# Patient Record
Sex: FEMALE | Race: WHITE | NOT HISPANIC OR LATINO | Employment: FULL TIME | URBAN - METROPOLITAN AREA
[De-identification: names, ages, dates, MRNs, and addresses within clinical notes are randomized per-mention and may not be internally consistent; named-entity substitution may affect disease eponyms.]

---

## 2017-01-27 DIAGNOSIS — Z00.00 LABORATORY TESTS ORDERED AS PART OF A COMPLETE PHYSICAL EXAM (CPE): Primary | ICD-10-CM

## 2017-01-31 LAB
25(OH)D3+25(OH)D2 SERPL-MCNC: 22.8 NG/ML (ref 30–100)
ALBUMIN SERPL-MCNC: 4 G/DL (ref 3.5–5.5)
ALBUMIN/GLOB SERPL: 1.7 {RATIO} (ref 1.1–2.5)
ALP SERPL-CCNC: 59 IU/L (ref 39–117)
ALT SERPL-CCNC: 10 IU/L (ref 0–32)
APPEARANCE UR: CLEAR
AST SERPL-CCNC: 15 IU/L (ref 0–40)
BACTERIA #/AREA URNS HPF: NORMAL /[HPF]
BASOPHILS # BLD AUTO: 0.1 X10E3/UL (ref 0–0.2)
BASOPHILS NFR BLD AUTO: 1 %
BILIRUB SERPL-MCNC: 0.5 MG/DL (ref 0–1.2)
BILIRUB UR QL STRIP: NEGATIVE
BUN SERPL-MCNC: 8 MG/DL (ref 6–24)
BUN/CREAT SERPL: 14 (ref 9–23)
CALCIUM SERPL-MCNC: 8.8 MG/DL (ref 8.7–10.2)
CHLORIDE SERPL-SCNC: 102 MMOL/L (ref 96–106)
CHOLEST SERPL-MCNC: 158 MG/DL (ref 100–199)
CO2 SERPL-SCNC: 23 MMOL/L (ref 18–29)
COLOR UR: YELLOW
CREAT SERPL-MCNC: 0.56 MG/DL (ref 0.57–1)
EOSINOPHIL # BLD AUTO: 0.1 X10E3/UL (ref 0–0.4)
EOSINOPHIL NFR BLD AUTO: 2 %
EPI CELLS #/AREA URNS HPF: NORMAL /HPF
ERYTHROCYTE [DISTWIDTH] IN BLOOD BY AUTOMATED COUNT: 13.5 % (ref 12.3–15.4)
ESTROGEN SERPL-MCNC: 135 PG/ML
FSH SERPL-ACNC: 22.9 MIU/ML
GLOBULIN SER CALC-MCNC: 2.4 G/DL (ref 1.5–4.5)
GLUCOSE SERPL-MCNC: 87 MG/DL (ref 65–99)
GLUCOSE UR QL: NEGATIVE
HCT VFR BLD AUTO: 39.8 % (ref 34–46.6)
HDLC SERPL-MCNC: 66 MG/DL
HGB BLD-MCNC: 13 G/DL (ref 11.1–15.9)
HGB UR QL STRIP: NEGATIVE
IMM GRANULOCYTES # BLD: 0 X10E3/UL (ref 0–0.1)
IMM GRANULOCYTES NFR BLD: 0 %
KETONES UR QL STRIP: NEGATIVE
LDLC SERPL CALC-MCNC: 79 MG/DL (ref 0–99)
LEUKOCYTE ESTERASE UR QL STRIP: NEGATIVE
LYMPHOCYTES # BLD AUTO: 2 X10E3/UL (ref 0.7–3.1)
LYMPHOCYTES NFR BLD AUTO: 26 %
MCH RBC QN AUTO: 29.5 PG (ref 26.6–33)
MCHC RBC AUTO-ENTMCNC: 32.7 G/DL (ref 31.5–35.7)
MCV RBC AUTO: 91 FL (ref 79–97)
MICRO URNS: (no result)
MICRO URNS: (no result)
MONOCYTES # BLD AUTO: 0.7 X10E3/UL (ref 0.1–0.9)
MONOCYTES NFR BLD AUTO: 9 %
NEUTROPHILS # BLD AUTO: 4.9 X10E3/UL (ref 1.4–7)
NEUTROPHILS NFR BLD AUTO: 62 %
NITRITE UR QL STRIP: NEGATIVE
PH UR STRIP: 7.5 [PH] (ref 5–7.5)
PLATELET # BLD AUTO: 336 X10E3/UL (ref 150–379)
POTASSIUM SERPL-SCNC: 4.9 MMOL/L (ref 3.5–5.2)
PROGEST SERPL-MCNC: 0.2 NG/ML
PROT SERPL-MCNC: 6.4 G/DL (ref 6–8.5)
PROT UR QL STRIP: NEGATIVE
RBC # BLD AUTO: 4.4 X10E6/UL (ref 3.77–5.28)
RBC #/AREA URNS HPF: NORMAL /HPF
SODIUM SERPL-SCNC: 140 MMOL/L (ref 134–144)
SP GR UR: <=1.005 (ref 1–1.03)
TRIGL SERPL-MCNC: 64 MG/DL (ref 0–149)
TSH SERPL DL<=0.005 MIU/L-ACNC: 0.99 UIU/ML (ref 0.45–4.5)
UROBILINOGEN UR STRIP-MCNC: 0.2 MG/DL (ref 0.2–1)
VLDLC SERPL CALC-MCNC: 13 MG/DL (ref 5–40)
WBC # BLD AUTO: 7.8 X10E3/UL (ref 3.4–10.8)
WBC #/AREA URNS HPF: NORMAL /HPF

## 2017-02-06 ENCOUNTER — OFFICE VISIT (OUTPATIENT)
Dept: INTERNAL MEDICINE | Facility: CLINIC | Age: 52
End: 2017-02-06

## 2017-02-06 VITALS
HEIGHT: 68 IN | BODY MASS INDEX: 26.67 KG/M2 | DIASTOLIC BLOOD PRESSURE: 84 MMHG | HEART RATE: 80 BPM | OXYGEN SATURATION: 98 % | WEIGHT: 176 LBS | SYSTOLIC BLOOD PRESSURE: 120 MMHG

## 2017-02-06 DIAGNOSIS — Z71.6 ENCOUNTER FOR SMOKING CESSATION COUNSELING: ICD-10-CM

## 2017-02-06 DIAGNOSIS — Z00.00 WELL ADULT EXAM: Primary | ICD-10-CM

## 2017-02-06 DIAGNOSIS — Z11.59 NEED FOR HEPATITIS C SCREENING TEST: ICD-10-CM

## 2017-02-06 PROBLEM — F17.210 LIGHT TOBACCO SMOKER <10 CIGARETTES PER DAY: Status: ACTIVE | Noted: 2017-02-06

## 2017-02-06 PROBLEM — J45.20 MILD INTERMITTENT ASTHMA WITHOUT COMPLICATION: Status: ACTIVE | Noted: 2017-02-06

## 2017-02-06 PROCEDURE — 99396 PREV VISIT EST AGE 40-64: CPT | Performed by: INTERNAL MEDICINE

## 2017-02-06 PROCEDURE — 93000 ELECTROCARDIOGRAM COMPLETE: CPT | Performed by: INTERNAL MEDICINE

## 2017-02-06 NOTE — PROGRESS NOTES
Subjective     Yi Hurtado is a 52 y.o. female who presents for a complete physical exam.      History of Present Illness     Review of Systems   Constitutional: Negative.    HENT: Negative.    Eyes: Negative.    Respiratory: Negative.    Cardiovascular: Negative.    Gastrointestinal: Negative.    Endocrine: Negative.    Genitourinary: Negative.    Musculoskeletal: Negative.    Skin: Negative.    Allergic/Immunologic: Negative.    Neurological: Negative.    Hematological: Negative.    Psychiatric/Behavioral: Negative.        The following portions of the patient's history were reviewed and updated as appropriate: allergies, current medications, past family history, past medical history, past social history, past surgical history and problem list.     Patient Active Problem List    Diagnosis Date Noted   • Mild intermittent asthma without complication 02/06/2017   • Light tobacco smoker <10 cigarettes per day 02/06/2017   • Allergic rhinitis 01/29/2016       Past Medical History   Diagnosis Date   • Allergic      sulpha, cipro   • Blood tests for routine general physical examination    • Earache    • Eustachian tube dysfunction    • Injury of back      lumbar bulging discs, unsure level   • Irritable bowel syndrome    • Myelopathy    • Simple goiter    • Urinary tract infection        Past Surgical History   Procedure Laterality Date   • Hysterectomy         Family History   Problem Relation Age of Onset   • Dementia Mother    • Hypothyroidism Mother    • Hypertension Mother    • Diabetes type II Mother    • Stroke Mother    • Alcohol abuse Father    • Other Father      acute heart attack   • Heart disease Father    • ADD / ADHD Son        Social History     Social History   • Marital status: Single     Spouse name: N/A   • Number of children: N/A   • Years of education: N/A     Occupational History   • Not on file.     Social History Main Topics   • Smoking status: Current Every Day Smoker     Packs/day: 0.20      "Types: Cigarettes   • Smokeless tobacco: Not on file   • Alcohol use 0.6 oz/week     1 Standard drinks or equivalent per week   • Drug use: Not on file   • Sexual activity: Not on file     Other Topics Concern   • Not on file     Social History Narrative       Current Outpatient Prescriptions on File Prior to Visit   Medication Sig Dispense Refill   • fluticasone (FLONASE) 50 MCG/ACT nasal spray 2 sprays into each nostril daily.     • Omega-3 Fatty Acids (FISH OIL PO) Take 1 tablet by mouth daily.     • [DISCONTINUED] Chlorcyclizine-Pseudoephed 25-60 MG tablet Take by mouth 2 (two) times a day.     • [DISCONTINUED] ciclopirox (PENLAC) 8 % solution Apply topically nightly. 1 mL 0   • [DISCONTINUED] fexofenadine-pseudoephedrine (ALLEGRA-D)  MG per 12 hr tablet Take 1 tablet by mouth every 12 (twelve) hours as needed.     • [DISCONTINUED] Probiotic Product (PROBIOTIC PO) Take by mouth.       No current facility-administered medications on file prior to visit.        Allergies   Allergen Reactions   • Ciprofloxacin    • Sulfa Antibiotics        Immunization History   Administered Date(s) Administered   • Tdap 05/01/2008       Objective     Visit Vitals   • /84   • Pulse 80   • Ht 67.5\" (171.5 cm)   • Wt 176 lb (79.8 kg)   • SpO2 98%   • BMI 27.16 kg/m2       Physical Exam   Constitutional: She is oriented to person, place, and time. She appears well-developed and well-nourished.   HENT:   Head: Normocephalic and atraumatic.   Right Ear: Hearing, tympanic membrane and external ear normal.   Left Ear: Hearing, tympanic membrane and external ear normal.   Nose: Nose normal.   Mouth/Throat: Oropharynx is clear and moist.   Neck: Neck supple. No thyromegaly present.   Cardiovascular: Normal rate, regular rhythm and normal heart sounds.    No murmur heard.  Pulmonary/Chest: Effort normal and breath sounds normal. Right breast exhibits no mass. Left breast exhibits no mass.   Abdominal: Soft. She exhibits no " distension. There is no hepatosplenomegaly. There is no tenderness.   Genitourinary: No breast tenderness.   Lymphadenopathy:     She has no cervical adenopathy.   Neurological: She is alert and oriented to person, place, and time.   Skin: Skin is warm and dry.   Psychiatric: She has a normal mood and affect.         ECG 12 Lead  Date/Time: 2/6/2017 10:32 AM  Performed by: MING BROWNING  Authorized by: MING BROWNING   Comparison: compared with previous ECG from 1/29/2016  Similar to previous ECG  Rhythm: sinus rhythm  Rate: normal  Conduction: non-specific intraventricular conduction delay  ST Segments: ST segments normal  T Waves: T waves normal  QRS axis: normal  Q waves: V1, V2 and V3  Clinical impression: non-specific ECG  Comments: Same appearance to EKG for number of years.                Assessment/Plan   Yi was seen today for annual exam.    Diagnoses and all orders for this visit:    Well adult exam  -     ECG 12 Lead    Laboratory tests ordered as part of a complete physical exam (CPE)    Need for hepatitis C screening test  -     Hepatitis C Antibody    Encounter for smoking cessation counseling        Discussion    Patient presents today for a CPE.      Patient follows a healthy diet.   Patient follows an adequate exercise regimen. Mammogram is up to date.   Colon cancer screening is up to date.   Pap smears are performed by the patient's gynecologist.   Immunizations:  See flow sheet.   DEXA is up to date.  We discussed smoking cessation today.  Discussed options for medications including Wellbutrin, Chantix and Nicoderm replacement.  She is going to try OTC nicotine replacement.     Health Maintenance   Topic Date Due   • HEPATITIS C SCREENING  01/29/2016   • TDAP/TD VACCINES (2 - Td) 05/01/2018   • MAMMOGRAM  05/12/2018   • PAP SMEAR  11/01/2019   • DXA SCAN  11/06/2019   • COLONOSCOPY  06/10/2023   • PNEUMOCOCCAL VACCINE (19-64 MEDIUM RISK)  Addressed   • INFLUENZA VACCINE  Excluded             No future appointments.

## 2017-02-07 LAB — HCV AB S/CO SERPL IA: <0.1 S/CO RATIO (ref 0–0.9)

## 2017-06-07 ENCOUNTER — OFFICE VISIT (OUTPATIENT)
Dept: INTERNAL MEDICINE | Facility: CLINIC | Age: 52
End: 2017-06-07

## 2017-06-07 VITALS
HEART RATE: 75 BPM | BODY MASS INDEX: 26.67 KG/M2 | DIASTOLIC BLOOD PRESSURE: 90 MMHG | TEMPERATURE: 97.5 F | SYSTOLIC BLOOD PRESSURE: 130 MMHG | OXYGEN SATURATION: 100 % | WEIGHT: 176 LBS | HEIGHT: 68 IN

## 2017-06-07 DIAGNOSIS — H69.81 EUSTACHIAN TUBE DYSFUNCTION, RIGHT: ICD-10-CM

## 2017-06-07 DIAGNOSIS — J01.90 ACUTE SINUSITIS, RECURRENCE NOT SPECIFIED, UNSPECIFIED LOCATION: Primary | ICD-10-CM

## 2017-06-07 PROCEDURE — 99214 OFFICE O/P EST MOD 30 MIN: CPT | Performed by: INTERNAL MEDICINE

## 2017-06-07 RX ORDER — AMOXICILLIN 500 MG/1
CAPSULE ORAL
Refills: 0 | COMMUNITY
Start: 2017-05-31 | End: 2017-09-11

## 2017-06-07 RX ORDER — TRIAMCINOLONE ACETONIDE 55 UG/1
2 SPRAY, METERED NASAL DAILY
COMMUNITY
End: 2017-12-08

## 2017-06-07 RX ORDER — METHYLPREDNISOLONE 4 MG/1
TABLET ORAL
Qty: 21 TABLET | Refills: 0 | Status: SHIPPED | OUTPATIENT
Start: 2017-06-07 | End: 2017-09-11

## 2017-06-07 RX ORDER — MONTELUKAST SODIUM 10 MG/1
10 TABLET ORAL NIGHTLY
COMMUNITY
End: 2018-03-19 | Stop reason: ALTCHOICE

## 2017-06-07 RX ORDER — DOXYCYCLINE 100 MG/1
100 CAPSULE ORAL 2 TIMES DAILY
Qty: 20 CAPSULE | Refills: 0 | Status: SHIPPED | OUTPATIENT
Start: 2017-06-07 | End: 2017-12-08

## 2017-06-07 NOTE — PROGRESS NOTES
"Subjective     Yi Hurtado is a 52 y.o. female who presents with   Chief Complaint   Patient presents with   • Earache     Right       History of Present Illness     Right ear pain.  Going on for a week.  She is taking Nasocort, afrin, and singulair.  Sinus pain and pressure associated.  Nothing in chest.  Her allergist put her on amoxil 500 BID over the phone.  Moderate severity of constant pain.     Review of Systems   Respiratory: Negative.    Cardiovascular: Negative.        The following portions of the patient's history were reviewed and updated as appropriate: allergies, current medications and problem list.    Patient Active Problem List    Diagnosis Date Noted   • Mild intermittent asthma without complication 02/06/2017   • Light tobacco smoker <10 cigarettes per day 02/06/2017   • Allergic rhinitis 01/29/2016       Current Outpatient Prescriptions on File Prior to Visit   Medication Sig Dispense Refill   • Chlorcyclizine-Pseudoephed 25-60 MG tablet Take  by mouth.     • ciclopirox (PENLAC) 8 % solution APPLY TOPICALLY AT NIGHT 1 each 0   • Multiple Vitamin (MULTI VITAMIN PO) Take  by mouth.     • Omega-3 Fatty Acids (FISH OIL PO) Take 1 tablet by mouth daily.     • [DISCONTINUED] fluticasone (FLONASE) 50 MCG/ACT nasal spray 2 sprays into each nostril daily.       No current facility-administered medications on file prior to visit.        Objective     /90  Pulse 75  Temp 97.5 °F (36.4 °C)  Ht 67.5\" (171.5 cm)  Wt 176 lb (79.8 kg)  SpO2 100%  BMI 27.16 kg/m2    Physical Exam   Constitutional: She is oriented to person, place, and time. She appears well-developed and well-nourished.   HENT:   Head: Normocephalic and atraumatic.   Right Ear: Hearing and tympanic membrane normal.   Left Ear: Hearing and tympanic membrane normal.   Mouth/Throat: No oropharyngeal exudate or posterior oropharyngeal erythema.   Cardiovascular: Normal rate, regular rhythm and normal heart sounds.    Pulmonary/Chest: " Effort normal and breath sounds normal.   Lymphadenopathy:     She has cervical adenopathy.        Right cervical: Superficial cervical adenopathy present.   Neurological: She is alert and oriented to person, place, and time.   Skin: Skin is warm and dry.   Psychiatric: She has a normal mood and affect. Her behavior is normal.       Assessment/Plan   Yi was seen today for earache.    Diagnoses and all orders for this visit:    Acute sinusitis, recurrence not specified, unspecified location    Eustachian tube dysfunction, right    Other orders  -     MethylPREDNISolone (MEDROL, DENNY,) 4 MG tablet; Take as directed on package instructions.  -     doxycycline (MONODOX) 100 MG capsule; Take 1 capsule by mouth 2 (Two) Times a Day.        Discussion  Patient presents with sinus infection and superficial LAD failing amoxil 500 BID.  Rx for steroid denny and change to doxycycline since Augmentin bothers her stomach.  Let me know if not feeling better over the next 3 days or if there is any change in symptoms.  I would caution on using Afrin more than three days.          Future Appointments  Date Time Provider Department Center   6/7/2017 11:45 AM MD SUKHJINDER Jackman None

## 2017-06-12 ENCOUNTER — OFFICE VISIT (OUTPATIENT)
Dept: INTERNAL MEDICINE | Facility: CLINIC | Age: 52
End: 2017-06-12

## 2017-06-12 VITALS
WEIGHT: 170 LBS | TEMPERATURE: 98 F | SYSTOLIC BLOOD PRESSURE: 124 MMHG | HEIGHT: 68 IN | HEART RATE: 81 BPM | OXYGEN SATURATION: 98 % | BODY MASS INDEX: 25.76 KG/M2 | DIASTOLIC BLOOD PRESSURE: 94 MMHG

## 2017-06-12 DIAGNOSIS — H92.01 RIGHT EAR PAIN: Primary | ICD-10-CM

## 2017-06-12 PROCEDURE — 99213 OFFICE O/P EST LOW 20 MIN: CPT | Performed by: INTERNAL MEDICINE

## 2017-06-12 RX ORDER — ALBUTEROL SULFATE 90 UG/1
2 AEROSOL, METERED RESPIRATORY (INHALATION) EVERY 4 HOURS PRN
Qty: 1 INHALER | Refills: 11 | Status: SHIPPED | OUTPATIENT
Start: 2017-06-12 | End: 2020-03-09

## 2017-06-12 NOTE — PROGRESS NOTES
Subjective     Yi Hurtado is a 52 y.o. female who presents with   Chief Complaint   Patient presents with   • Earache   • Facial Pain       History of Present Illness     Right ear pain. Going on for two weeks. She was taking Nasocort, afrin, and singulair. Sinus pain and pressure associated.  Her allergist put her on amoxil 500 BID over the phone and then I changed her to doxycycline and steriod denny.  Steriod denny helped but now pain has returned.  Now she is having some SOA as well with her h/o asthma and needs her inhaler refilled for sporadic use.     Review of Systems   Constitutional: Negative for fever.       The following portions of the patient's history were reviewed and updated as appropriate: allergies, current medications and problem list.    Patient Active Problem List    Diagnosis Date Noted   • Mild intermittent asthma without complication 02/06/2017   • Light tobacco smoker <10 cigarettes per day 02/06/2017   • Allergic rhinitis 01/29/2016       Current Outpatient Prescriptions on File Prior to Visit   Medication Sig Dispense Refill   • amoxicillin (AMOXIL) 500 MG capsule   0   • Chlorcyclizine-Pseudoephed 25-60 MG tablet Take  by mouth.     • CHLORPHEN-PSE-ATROP-HYOS-SCOP PO Take  by mouth.     • ciclopirox (PENLAC) 8 % solution APPLY TOPICALLY AT NIGHT 1 each 0   • doxycycline (MONODOX) 100 MG capsule Take 1 capsule by mouth 2 (Two) Times a Day. 20 capsule 0   • MethylPREDNISolone (MEDROL, DENNY,) 4 MG tablet Take as directed on package instructions. 21 tablet 0   • montelukast (SINGULAIR) 10 MG tablet Take 10 mg by mouth Every Night.     • Multiple Vitamin (MULTI VITAMIN PO) Take  by mouth.     • Omega-3 Fatty Acids (FISH OIL PO) Take 1 tablet by mouth daily.     • Triamcinolone Acetonide (NASACORT) 55 MCG/ACT nasal inhaler 2 sprays into each nostril Daily.       No current facility-administered medications on file prior to visit.        Objective     /94  Pulse 81  Temp 98 °F (36.7 °C)   "Ht 67.5\" (171.5 cm)  Wt 170 lb (77.1 kg)  SpO2 98%  BMI 26.23 kg/m2    Physical Exam   Constitutional: She is oriented to person, place, and time. She appears well-developed and well-nourished.   HENT:   Head: Normocephalic and atraumatic.   Right Ear: Hearing normal. Tympanic membrane is bulging.   Left Ear: Hearing and tympanic membrane normal.   Mouth/Throat: No oropharyngeal exudate or posterior oropharyngeal erythema.   Cardiovascular: Normal rate, regular rhythm and normal heart sounds.    Pulmonary/Chest: Effort normal and breath sounds normal.   Neurological: She is alert and oriented to person, place, and time.   Skin: Skin is warm and dry.   Psychiatric: She has a normal mood and affect. Her behavior is normal.       Assessment/Plan   Yi was seen today for earache and facial pain.    Diagnoses and all orders for this visit:    Right ear pain  -     Ambulatory Referral to ENT (Otolaryngology)    Other orders  -     albuterol (PROVENTIL HFA;VENTOLIN HFA) 108 (90 BASE) MCG/ACT inhaler; Inhale 2 puffs Every 4 (Four) Hours As Needed for Wheezing.        Discussion  Patient presents with right ear pain that I favor is secondary to eustachian tube dysfunction.  She is using decongestants, nasal and oral steroids, antibiotics with little relief.  Refer to ENT for opinion.  Appointment made for Wednesday.  15 minutes spent with the patient with 10 minutes spent counseling the following topics:, impressions         No future appointments.      "

## 2017-09-11 ENCOUNTER — OFFICE VISIT (OUTPATIENT)
Dept: INTERNAL MEDICINE | Facility: CLINIC | Age: 52
End: 2017-09-11

## 2017-09-11 VITALS
DIASTOLIC BLOOD PRESSURE: 98 MMHG | BODY MASS INDEX: 25.91 KG/M2 | WEIGHT: 171 LBS | HEIGHT: 68 IN | HEART RATE: 75 BPM | OXYGEN SATURATION: 98 % | SYSTOLIC BLOOD PRESSURE: 132 MMHG

## 2017-09-11 DIAGNOSIS — R82.90 ABNORMAL URINE: Primary | ICD-10-CM

## 2017-09-11 LAB
BILIRUB BLD-MCNC: NEGATIVE MG/DL
CLARITY, POC: CLEAR
COLOR UR: YELLOW
GLUCOSE UR STRIP-MCNC: NEGATIVE MG/DL
KETONES UR QL: NEGATIVE
LEUKOCYTE EST, POC: ABNORMAL
NITRITE UR-MCNC: NEGATIVE MG/ML
PH UR: 5 [PH] (ref 5–8)
PROT UR STRIP-MCNC: NEGATIVE MG/DL
RBC # UR STRIP: ABNORMAL /UL
SP GR UR: 1 (ref 1–1.03)
UROBILINOGEN UR QL: NORMAL

## 2017-09-11 PROCEDURE — 99213 OFFICE O/P EST LOW 20 MIN: CPT | Performed by: INTERNAL MEDICINE

## 2017-09-11 PROCEDURE — 81003 URINALYSIS AUTO W/O SCOPE: CPT | Performed by: INTERNAL MEDICINE

## 2017-09-11 NOTE — PROGRESS NOTES
Subjective     Yi Hurtado is a 52 y.o. female who presents with   Chief Complaint   Patient presents with   • Urinary Tract Infection     Follow up       History of Present Illness     Two weeks ago with UTI.  Seen at [facility].  She was given macrobid.  Foul smelling urine, dysuria, and frequency.  Symptoms resolved.  Past few mornings with altered smell of urine.      Review of Systems   Constitutional: Negative for fever.   Gastrointestinal: Negative for abdominal pain.       The following portions of the patient's history were reviewed and updated as appropriate: allergies, current medications and problem list.    Patient Active Problem List    Diagnosis Date Noted   • Mild intermittent asthma without complication 02/06/2017   • Light tobacco smoker <10 cigarettes per day 02/06/2017   • Allergic rhinitis 01/29/2016       Current Outpatient Prescriptions on File Prior to Visit   Medication Sig Dispense Refill   • albuterol (PROVENTIL HFA;VENTOLIN HFA) 108 (90 BASE) MCG/ACT inhaler Inhale 2 puffs Every 4 (Four) Hours As Needed for Wheezing. 1 inhaler 11   • Chlorcyclizine-Pseudoephed 25-60 MG tablet Take  by mouth.     • CHLORPHEN-PSE-ATROP-HYOS-SCOP PO Take  by mouth.     • ciclopirox (PENLAC) 8 % solution APPLY TOPICALLY AT NIGHT 1 each 0   • doxycycline (MONODOX) 100 MG capsule Take 1 capsule by mouth 2 (Two) Times a Day. 20 capsule 0   • montelukast (SINGULAIR) 10 MG tablet Take 10 mg by mouth Every Night.     • Multiple Vitamin (MULTI VITAMIN PO) Take  by mouth.     • Omega-3 Fatty Acids (FISH OIL PO) Take 1 tablet by mouth daily.     • Triamcinolone Acetonide (NASACORT) 55 MCG/ACT nasal inhaler 2 sprays into each nostril Daily.     • [DISCONTINUED] amoxicillin (AMOXIL) 500 MG capsule   0   • [DISCONTINUED] MethylPREDNISolone (MEDROL, DENNY,) 4 MG tablet Take as directed on package instructions. 21 tablet 0     No current facility-administered medications on file prior to visit.        Objective     BP  "132/98  Pulse 75  Ht 67.5\" (171.5 cm)  Wt 171 lb (77.6 kg)  SpO2 98%  BMI 26.39 kg/m2    Physical Exam   Constitutional: She is oriented to person, place, and time. She appears well-developed and well-nourished.   HENT:   Head: Normocephalic and atraumatic.   Pulmonary/Chest: Effort normal.   Neurological: She is alert and oriented to person, place, and time.   Psychiatric: She has a normal mood and affect. Her behavior is normal.       Assessment/Plan   Yi was seen today for urinary tract infection.    Diagnoses and all orders for this visit:    Abnormal urine  -     POC Urinalysis Dipstick, Automated  -     Urine Culture        Discussion  Patient presents in f/u of UTI.  She still has foul smelling urine at times.  Urine dip with moderate leukocytes.  Await culture before treatment.  Continue Azo cranberry.  10/15 minutes was spent in counseling of the following topics: test results, impressions, treatment options.         No future appointments.      "

## 2017-09-13 ENCOUNTER — TELEPHONE (OUTPATIENT)
Dept: INTERNAL MEDICINE | Facility: CLINIC | Age: 52
End: 2017-09-13

## 2017-09-13 NOTE — TELEPHONE ENCOUNTER
Pt would like UA results  LG    I am still awaiting culture results.  It takes three days.  GRACIELA

## 2017-09-15 LAB
BACTERIA UR CULT: ABNORMAL
BACTERIA UR CULT: ABNORMAL
OTHER ANTIBIOTIC SUSC ISLT: ABNORMAL

## 2017-09-15 RX ORDER — AMOXICILLIN AND CLAVULANATE POTASSIUM 875; 125 MG/1; MG/1
1 TABLET, FILM COATED ORAL 2 TIMES DAILY
Qty: 14 TABLET | Refills: 0 | Status: SHIPPED | OUTPATIENT
Start: 2017-09-15 | End: 2017-12-08

## 2017-10-13 ENCOUNTER — TELEPHONE (OUTPATIENT)
Dept: INTERNAL MEDICINE | Facility: CLINIC | Age: 52
End: 2017-10-13

## 2017-10-13 NOTE — TELEPHONE ENCOUNTER
Pt called and would like information on the quitting smoking program you suggested to her at her physical.  LG    I would have her call 1-800-quit-now for info on programs.  I also have a handout to mail her.  GRACIELA

## 2017-12-08 ENCOUNTER — OFFICE VISIT (OUTPATIENT)
Dept: INTERNAL MEDICINE | Facility: CLINIC | Age: 52
End: 2017-12-08

## 2017-12-08 VITALS
HEIGHT: 68 IN | OXYGEN SATURATION: 98 % | DIASTOLIC BLOOD PRESSURE: 90 MMHG | HEART RATE: 78 BPM | BODY MASS INDEX: 26.37 KG/M2 | SYSTOLIC BLOOD PRESSURE: 120 MMHG | WEIGHT: 174 LBS

## 2017-12-08 DIAGNOSIS — R07.89 ATYPICAL CHEST PAIN: Primary | ICD-10-CM

## 2017-12-08 DIAGNOSIS — R13.10 PAIN WITH SWALLOWING: ICD-10-CM

## 2017-12-08 PROCEDURE — 99213 OFFICE O/P EST LOW 20 MIN: CPT | Performed by: INTERNAL MEDICINE

## 2017-12-08 PROCEDURE — 93000 ELECTROCARDIOGRAM COMPLETE: CPT | Performed by: INTERNAL MEDICINE

## 2017-12-08 PROCEDURE — 71020 XR CHEST PA AND LATERAL: CPT | Performed by: INTERNAL MEDICINE

## 2017-12-08 RX ORDER — FEXOFENADINE HCL 180 MG/1
180 TABLET ORAL 2 TIMES DAILY
COMMUNITY

## 2017-12-08 RX ORDER — FLUTICASONE PROPIONATE 50 MCG
2 SPRAY, SUSPENSION (ML) NASAL DAILY
COMMUNITY
End: 2018-03-19 | Stop reason: ALTCHOICE

## 2017-12-08 NOTE — PROGRESS NOTES
Subjective     Yi Hurtado is a 52 y.o. female who presents with   Chief Complaint   Patient presents with   • GI Problem       History of Present Illness     Substernal pain.  Off and on pain for six months.  Hurts to swallow.  No dysphagia.  Heartburn is associated.  More stress.  Not with exertion.  No cough.  No SOA.   No nausea.  No ABD pain.      Review of Systems   Respiratory: Negative for cough and shortness of breath.    Cardiovascular: Positive for chest pain. Negative for palpitations and leg swelling.       The following portions of the patient's history were reviewed and updated as appropriate: allergies, current medications and problem list.    Patient Active Problem List    Diagnosis Date Noted   • Mild intermittent asthma without complication 02/06/2017   • Light tobacco smoker <10 cigarettes per day 02/06/2017   • Allergic rhinitis 01/29/2016       Current Outpatient Prescriptions on File Prior to Visit   Medication Sig Dispense Refill   • albuterol (PROVENTIL HFA;VENTOLIN HFA) 108 (90 BASE) MCG/ACT inhaler Inhale 2 puffs Every 4 (Four) Hours As Needed for Wheezing. 1 inhaler 11   • ciclopirox (PENLAC) 8 % solution APPLY TOPICALLY AT NIGHT 1 each 0   • montelukast (SINGULAIR) 10 MG tablet Take 10 mg by mouth Every Night.     • Multiple Vitamin (MULTI VITAMIN PO) Take  by mouth.     • Omega-3 Fatty Acids (FISH OIL PO) Take 1 tablet by mouth daily.     • [DISCONTINUED] amoxicillin-clavulanate (AUGMENTIN) 875-125 MG per tablet Take 1 tablet by mouth 2 (Two) Times a Day. 14 tablet 0   • [DISCONTINUED] Chlorcyclizine-Pseudoephed 25-60 MG tablet Take  by mouth.     • [DISCONTINUED] CHLORPHEN-PSE-ATROP-HYOS-SCOP PO Take  by mouth.     • [DISCONTINUED] doxycycline (MONODOX) 100 MG capsule Take 1 capsule by mouth 2 (Two) Times a Day. 20 capsule 0   • [DISCONTINUED] Triamcinolone Acetonide (NASACORT) 55 MCG/ACT nasal inhaler 2 sprays into each nostril Daily.       No current facility-administered  "medications on file prior to visit.        Objective     /90  Pulse 78  Ht 171.5 cm (67.52\")  Wt 78.9 kg (174 lb)  SpO2 98%  BMI 26.83 kg/m2    Physical Exam   Constitutional: She is oriented to person, place, and time. She appears well-developed and well-nourished.   HENT:   Head: Normocephalic and atraumatic.   Cardiovascular: Normal rate, regular rhythm and normal heart sounds.    Pulmonary/Chest: Effort normal and breath sounds normal.   Neurological: She is alert and oriented to person, place, and time.   Skin: Skin is warm and dry.   Psychiatric: She has a normal mood and affect. Her behavior is normal.         ECG 12 Lead  Date/Time: 12/8/2017 9:51 AM  Performed by: MING BROWNING  Authorized by: MING BROWNING   Comparison: compared with previous ECG from 2/6/2017  Similar to previous ECG  Rhythm: sinus rhythm  Rate: normal  Conduction: conduction normal  ST Segments: ST segments normal  T Waves: T waves normal  QRS axis: normal  Q waves: V1, V2 and V3  Clinical impression: non-specific ECG        X-rays of the chest  performed today for following indication:   pain.  X-ray reveal nad.  There is no available x-ray for comparison.  X-ray sent to radiology for official interpretation and findings.        Assessment/Plan   Yi was seen today for gi problem.    Diagnoses and all orders for this visit:    Atypical chest pain  -     ECG 12 Lead  -     Ambulatory Referral to General Surgery  -     Treadmill Stress Test; Future    Pain with swallowing  -     Ambulatory Referral to General Surgery        Discussion    Patient presents with atypical CP and pain with swallowing which sounds refer to the esophagus.  .  Chest x-ray is unremarkable and EKG is unchanged.  Schedule stress test.  Start Prilosec OTC.  Refer back to Dr. Greer for consideration of an EGD.  Schedule a CPE in 2/2018.           Future Appointments  Date Time Provider Department Center   3/20/2018 8:40 AM [facility]LAUGHTER " [facility] None   3/27/2018 11:15 AM MD DANIEL JackmanK [facility] None

## 2017-12-26 ENCOUNTER — HOSPITAL ENCOUNTER (OUTPATIENT)
Dept: CARDIOLOGY | Facility: HOSPITAL | Age: 52
Discharge: HOME | End: 2017-12-26

## 2017-12-26 DIAGNOSIS — R07.89 ATYPICAL CHEST PAIN: ICD-10-CM

## 2018-02-12 ENCOUNTER — TELEPHONE (OUTPATIENT)
Dept: INTERNAL MEDICINE | Facility: CLINIC | Age: 53
End: 2018-02-12

## 2018-02-12 RX ORDER — OSELTAMIVIR PHOSPHATE 75 MG/1
75 CAPSULE ORAL 2 TIMES DAILY
Qty: 10 CAPSULE | Refills: 0 | Status: SHIPPED | OUTPATIENT
Start: 2018-02-12 | End: 2018-02-12 | Stop reason: SDUPTHER

## 2018-02-12 RX ORDER — OSELTAMIVIR PHOSPHATE 75 MG/1
75 CAPSULE ORAL 2 TIMES DAILY
Qty: 10 CAPSULE | Refills: 0 | Status: SHIPPED | OUTPATIENT
Start: 2018-02-12 | End: 2018-03-19

## 2018-02-12 NOTE — TELEPHONE ENCOUNTER
is being treated for the flu and now she is having chills. Can you call her in [facility]? [facility]    OK.  I called in.  SLW    Patient advised. [facility]

## 2018-03-15 DIAGNOSIS — Z00.00 HEALTH CARE MAINTENANCE: Primary | ICD-10-CM

## 2018-03-19 ENCOUNTER — OFFICE VISIT (OUTPATIENT)
Dept: SURGERY | Facility: CLINIC | Age: 53
End: 2018-03-19

## 2018-03-19 VITALS — WEIGHT: 175 LBS | BODY MASS INDEX: 25.92 KG/M2 | HEART RATE: 72 BPM | HEIGHT: 69 IN | OXYGEN SATURATION: 98 %

## 2018-03-19 DIAGNOSIS — F17.210 LIGHT TOBACCO SMOKER <10 CIGARETTES PER DAY: ICD-10-CM

## 2018-03-19 DIAGNOSIS — R13.10 DYSPHAGIA, UNSPECIFIED TYPE: Primary | ICD-10-CM

## 2018-03-19 LAB
25(OH)D3+25(OH)D2 SERPL-MCNC: 26.2 NG/ML (ref 30–100)
ALBUMIN SERPL-MCNC: 4.1 G/DL (ref 3.5–5.5)
ALBUMIN/GLOB SERPL: 1.5 {RATIO} (ref 1.2–2.2)
ALP SERPL-CCNC: 60 IU/L (ref 39–117)
ALT SERPL-CCNC: 12 IU/L (ref 0–32)
APPEARANCE UR: CLEAR
AST SERPL-CCNC: 17 IU/L (ref 0–40)
BACTERIA #/AREA URNS HPF: NORMAL /[HPF]
BASOPHILS # BLD AUTO: 0.1 X10E3/UL (ref 0–0.2)
BASOPHILS NFR BLD AUTO: 1 %
BILIRUB SERPL-MCNC: 0.6 MG/DL (ref 0–1.2)
BILIRUB UR QL STRIP: NEGATIVE
BUN SERPL-MCNC: 10 MG/DL (ref 6–24)
BUN/CREAT SERPL: 17 (ref 9–23)
CALCIUM SERPL-MCNC: 9.3 MG/DL (ref 8.7–10.2)
CHLORIDE SERPL-SCNC: 103 MMOL/L (ref 96–106)
CHOLEST SERPL-MCNC: 173 MG/DL (ref 100–199)
CO2 SERPL-SCNC: 25 MMOL/L (ref 18–29)
COLOR UR: YELLOW
CREAT SERPL-MCNC: 0.58 MG/DL (ref 0.57–1)
EOSINOPHIL # BLD AUTO: 0.2 X10E3/UL (ref 0–0.4)
EOSINOPHIL NFR BLD AUTO: 2 %
EPI CELLS #/AREA URNS HPF: NORMAL /HPF
ERYTHROCYTE [DISTWIDTH] IN BLOOD BY AUTOMATED COUNT: 13.7 % (ref 12.3–15.4)
GFR SERPLBLD CREATININE-BSD FMLA CKD-EPI: 104 ML/MIN/1.73
GFR SERPLBLD CREATININE-BSD FMLA CKD-EPI: 120 ML/MIN/1.73
GLOBULIN SER CALC-MCNC: 2.7 G/DL (ref 1.5–4.5)
GLUCOSE SERPL-MCNC: 88 MG/DL (ref 65–99)
GLUCOSE UR QL: NEGATIVE
HCT VFR BLD AUTO: 42.8 % (ref 34–46.6)
HDLC SERPL-MCNC: 69 MG/DL
HGB BLD-MCNC: 14.1 G/DL (ref 11.1–15.9)
HGB UR QL STRIP: NEGATIVE
IMM GRANULOCYTES # BLD: 0 X10E3/UL (ref 0–0.1)
IMM GRANULOCYTES NFR BLD: 0 %
KETONES UR QL STRIP: NEGATIVE
LDLC SERPL CALC-MCNC: 91 MG/DL (ref 0–99)
LEUKOCYTE ESTERASE UR QL STRIP: NEGATIVE
LYMPHOCYTES # BLD AUTO: 2.3 X10E3/UL (ref 0.7–3.1)
LYMPHOCYTES NFR BLD AUTO: 26 %
MCH RBC QN AUTO: 30.3 PG (ref 26.6–33)
MCHC RBC AUTO-ENTMCNC: 32.9 G/DL (ref 31.5–35.7)
MCV RBC AUTO: 92 FL (ref 79–97)
MICRO URNS: NORMAL
MICRO URNS: NORMAL
MONOCYTES # BLD AUTO: 0.8 X10E3/UL (ref 0.1–0.9)
MONOCYTES NFR BLD AUTO: 9 %
MUCOUS THREADS URNS QL MICRO: PRESENT
NEUTROPHILS # BLD AUTO: 5.4 X10E3/UL (ref 1.4–7)
NEUTROPHILS NFR BLD AUTO: 62 %
NITRITE UR QL STRIP: NEGATIVE
PH UR STRIP: 7 [PH] (ref 5–7.5)
PLATELET # BLD AUTO: 319 X10E3/UL (ref 150–379)
POTASSIUM SERPL-SCNC: 4.7 MMOL/L (ref 3.5–5.2)
PROT SERPL-MCNC: 6.8 G/DL (ref 6–8.5)
PROT UR QL STRIP: NEGATIVE
RBC # BLD AUTO: 4.65 X10E6/UL (ref 3.77–5.28)
RBC #/AREA URNS HPF: NORMAL /HPF
SODIUM SERPL-SCNC: 142 MMOL/L (ref 134–144)
SP GR UR: 1.01 (ref 1–1.03)
TRIGL SERPL-MCNC: 64 MG/DL (ref 0–149)
TSH SERPL DL<=0.005 MIU/L-ACNC: 1.12 UIU/ML (ref 0.45–4.5)
URINALYSIS REFLEX: NORMAL
UROBILINOGEN UR STRIP-MCNC: 0.2 MG/DL (ref 0.2–1)
VLDLC SERPL CALC-MCNC: 13 MG/DL (ref 5–40)
WBC # BLD AUTO: 8.8 X10E3/UL (ref 3.4–10.8)
WBC #/AREA URNS HPF: NORMAL /HPF

## 2018-03-19 PROCEDURE — 99214 OFFICE O/P EST MOD 30 MIN: CPT | Performed by: SURGERY

## 2018-03-19 NOTE — PROGRESS NOTES
SURGERY  Yi Hurtado   1965 03/19/18    Chief Complaint:  Substernal chest pain    HPI    Patient is a very nice 53 y.o. female who happens to be the sister of The Kevin, who comes in with symptoms very much like that of when she was seen in 2013, the only substantial difference being that it is now substernal instead of epigastric.  She describes that she has this pain in the substernal region that occurs when swallowing and also deep in the left chest region.  Is not particularly worse with cold or hot fluids, solids or meats.  Dr. Vega did start her on Prilosec, which she's been taking for about 2 weeks, and she thinks his symptoms are better.    She did have an EGD in 2013 when she presented with similar symptoms, where she was essentially found to have no anatomic abnormality, with path showing mild chronic inflammation.  It was negative for eosinophilic esophagitis, which I suspected slightly, based on its ringed appearance.    She confides that she's been under more stress recently, and thinks that her symptoms are related to the stress.  She has learned that avoiding large meals late in the day improves things, consistent with symptoms of reflux.    She does smoke and has for quite some time, which prompted a question about a chest x-ray.  She did have chest x-ray by Dr. Vega in December which showed no evidence of active disease.    Past Medical History:   Diagnosis Date   • Allergic     sulpha, cipro   • Asthma    • Blood tests for routine general physical examination    • Dysphagia 06/10/2013    Dr. Leana Greer   • Earache    • Eustachian tube dysfunction    • Injury of back     lumbar bulging discs, unsure level   • Irritable bowel syndrome    • Myelopathy 1984    Dr. Ross Fernandez and Dr. Moises Orozco   • Seizures    • Simple goiter    • Spinal stenosis    • Urinary tract infection      Past Surgical History:   Procedure Laterality Date   • APPENDECTOMY N/A 1969   • CATARACT EXTRACTION,  BILATERAL Bilateral 3/2017, 4/2017   • ENDOSCOPY AND COLONOSCOPY N/A 06/10/2013    Mild suggestion of a ringed esophagus, biopsied to r/o eosinophilic esophagitis-Dr. Leana Greer   • MAMMO FINE NEEDLE ASPIRATION UNILATERAL Right 2013    pathology negative   • PILONIDAL CYSTECTOMY N/A    • VAGINAL HYSTERECTOMY SALPINGO OOPHORECTOMY WITH ANTERIOR AND POSTERIOR VAGINAL REPAIR N/A 04/27/2011    Total vaginal hysterectomy, external Andrews's culdoplasty and fulguration of implant of culk-de-sac endometriosis-Dr. 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[employer]     Social History Main Topics   • Smoking status: Current Every Day Smoker     Packs/day: 0.20     Types: Cigarettes   • Smokeless tobacco: Not on file   • Alcohol use 0.6 oz/week     1 Standard drinks or equivalent per week   • Drug use: Unknown   • Sexual activity: Defer     Other Topics Concern   • Not on file     Social History Narrative   • No narrative on file     Occupation/Additional Social Hx: Denia Limon's sister      Current Outpatient Prescriptions:   •  albuterol (PROVENTIL HFA;VENTOLIN HFA) 108 (90 BASE) MCG/ACT inhaler, Inhale 2 puffs Every 4 (Four) Hours As Needed for Wheezing., Disp: 1 inhaler, Rfl: 11  •  ALLERGY SERUM INJECTION, 2 injections per week, Disp: , Rfl:   •  fexofenadine (ALLEGRA) 180 MG tablet, Take 180 mg by mouth 2 (Two) Times a Day., Disp: , Rfl:   •  Flaxseed, Linseed, (FLAX SEEDS PO), Take 1 tablet by mouth Daily., Disp: , Rfl:   •  Multiple  "Vitamin (MULTI VITAMIN PO), Take 1 tablet by mouth Daily., Disp: , Rfl:   •  Omega-3 Fatty Acids (FISH OIL PO), Take 1 tablet by mouth daily., Disp: , Rfl:   •  OMEPRAZOLE PO, Take 1 tablet by mouth Daily As Needed., Disp: , Rfl:   •  Triamcinolone Acetonide (NASACORT AQ NA), 1 spray into each nostril Daily As Needed., Disp: , Rfl:     Allergies   Allergen Reactions   • Ciprofloxacin Swelling   • Sulfa Antibiotics Mental Status Change   • Macrobid [Nitrofurantoin Macrocrystal] Dizziness     Preventative Medicine  Colonoscopy: 2013  Review of Systems   Constitutional: Positive for chills, diaphoresis, fatigue and fever.   HENT: Positive for congestion, nosebleeds and sneezing.    Respiratory: Positive for cough.    Gastrointestinal: Positive for constipation and nausea.   Genitourinary: Positive for frequency.   Musculoskeletal: Positive for back pain.   Allergic/Immunologic: Positive for environmental allergies.   Neurological: Positive for weakness and headaches.   All other systems reviewed and are negative.      Vitals:    03/19/18 1249   Pulse: 72   SpO2: 98%   Weight: 79.4 kg (175 lb)   Height: 174 cm (68.5\")       PHYSICAL EXAM:    Pulse 72   Ht 174 cm (68.5\")   Wt 79.4 kg (175 lb)   SpO2 98%   BMI 26.22 kg/m²   Body mass index is 26.22 kg/m².    Constitutional: well developed, well nourished, Appears stated age  Eyes: sclera nonicteric, conjunctiva not injected or edematous  ENMT: Hearing intact, trachea midline, thyroid without masses  CVS: RRR, no murmur, peripheral edema not present  Respiratory: CTA, normal respiratory effort   Gastrointestinal: no hepatosplenomegaly, abdomen soft, nontender, abdominal hernia not detected  Genitourinary: inguinal hernia not detected  Musculoskeletal: gait normal, muscle mass normal  Skin: warm and dry, no rashes visible  Neurological: awake and alert, seems to have reasonable capacity for understanding for medical decision making  Psychiatric: appears to have " reasonable judgement, pleasant  Lymphatics: no cervical adenopathy     Radiographic Findings: Chest x-ray reviewed and appeared normal    Lab Findings: Pathology reviewed from prior EGD showing no eosinophilic esophagitis    Pamphlet reviewed: None    IMPRESSION:  · Suspected reflux with esophagitis, symptoms heightened by stress.  Again her symptoms are much like they were 5 years ago and there are improved with Prilosec  · Absence of osteoporosis, by DEXA scan 2014  · Smoker    PLAN:  · Upper GI, esophagram.  I think it would be helpful to find out if the function of her esophagus is normal, which will be better elucidated by a barium study then by an EGD.  If there is something remarkable on this, then I will recommend an EGD.  · Suggested that she continue the Prilosec for 90 days, to see if her symptoms improve, and then DC it.  Did discuss with her the risk of osteoporosis with long-term use of her Pump inhibitors.  This is heightened by smoking  · Obviously suggested that she reduce distress in her life, but as is the case with almost everyone, this is very difficult to actually accomplished.  If we can at least reassure her that is nothing serious, then I think that will be helpful.  · Counseled to quit smoking    Leana Greer MD  03/19/18  5:51 PM    ADDEND    UGI with air contrast and KUB shows mild effacement of the posterior cervical esophagus by spurring, but with the overall caliber normal.  The EG junction was 3 cm, with a small sliding hiatal hernia, patulous junction, with mild to moderate GE reflux with recumbency.  There is normal emptying of the stomach and normal esophageal peristalsis.  7/31/18.      Clinical staff to phone and if symptoms persist, will get EGD    Leana Greer MD  08/01/18

## 2018-03-27 ENCOUNTER — OFFICE VISIT (OUTPATIENT)
Dept: INTERNAL MEDICINE | Facility: CLINIC | Age: 53
End: 2018-03-27

## 2018-03-27 VITALS
DIASTOLIC BLOOD PRESSURE: 75 MMHG | HEART RATE: 79 BPM | WEIGHT: 175 LBS | SYSTOLIC BLOOD PRESSURE: 139 MMHG | HEIGHT: 69 IN | OXYGEN SATURATION: 100 % | BODY MASS INDEX: 25.92 KG/M2

## 2018-03-27 DIAGNOSIS — Z00.00 WELL ADULT EXAM: Primary | ICD-10-CM

## 2018-03-27 PROBLEM — E55.9 VITAMIN D DEFICIENCY: Status: ACTIVE | Noted: 2018-03-27

## 2018-03-27 PROCEDURE — 99396 PREV VISIT EST AGE 40-64: CPT | Performed by: INTERNAL MEDICINE

## 2018-03-27 NOTE — PROGRESS NOTES
Subjective     Yi Hurtado is a 53 y.o. female who presents for a complete physical exam.      History of Present Illness     Reviewed labs which overall look good.      Review of Systems   Constitutional: Negative.    Eyes: Negative.    Respiratory: Negative.    Cardiovascular: Negative.    Gastrointestinal: Negative.    Endocrine: Negative.    Genitourinary: Negative.    Musculoskeletal: Negative.    Skin: Negative.    Allergic/Immunologic: Negative.    Neurological: Negative.    Hematological: Negative.    Psychiatric/Behavioral: Negative.        The following portions of the patient's history were reviewed and updated as appropriate: allergies, current medications, past family history, past medical history, past social history, past surgical history and problem list.  Health maintenance tab was reviewed and updated with the patient.       Patient Active Problem List    Diagnosis Date Noted   • Mild intermittent asthma without complication 02/06/2017   • Light tobacco smoker <10 cigarettes per day 02/06/2017   • Allergic rhinitis 01/29/2016   • Dysphagia 06/10/2013     Note Last Updated: 3/19/2018     Dr. Leana Greer         Past Medical History:   Diagnosis Date   • Allergic     sulpha, cipro   • Asthma    • Blood tests for routine general physical examination    • Dysphagia 06/10/2013    Dr. Leana Greer   • Earache    • Eustachian tube dysfunction    • Injury of back     lumbar bulging discs, unsure level   • Irritable bowel syndrome    • Myelopathy 1984    Dr. Ross Fernandez and Dr. Moises Orozco   • Seizures    • Simple goiter    • Spinal stenosis    • Urinary tract infection        Past Surgical History:   Procedure Laterality Date   • APPENDECTOMY N/A 1969   • CATARACT EXTRACTION, BILATERAL Bilateral 3/2017, 4/2017   • ENDOSCOPY AND COLONOSCOPY N/A 06/10/2013    Mild suggestion of a ringed esophagus, biopsied to r/o eosinophilic esophagitis-Dr. Leana Greer   • MAMMO FINE NEEDLE ASPIRATION UNILATERAL  Right 2013    pathology negative   • PILONIDAL CYSTECTOMY N/A    • VAGINAL HYSTERECTOMY SALPINGO OOPHORECTOMY WITH ANTERIOR AND POSTERIOR VAGINAL REPAIR N/A 04/27/2011    Total vaginal hysterectomy, external Andrews's culdoplasty and fulguration of implant of culk-de-sac endometriosis-Dr. 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[employer]     Social History Main Topics   • Smoking status: Current Every Day Smoker     Packs/day: 0.20     Types: Cigarettes   • Smokeless tobacco: Not on file   • Alcohol use 0.6 oz/week     1 Standard drinks or equivalent per week   • Drug use: Unknown   • Sexual activity: Defer     Other Topics Concern   • Not on file     Social History Narrative   • No narrative on file       Current Outpatient Prescriptions on File Prior to Visit   Medication Sig Dispense Refill   • albuterol (PROVENTIL HFA;VENTOLIN HFA) 108 (90 BASE) MCG/ACT inhaler Inhale 2 puffs Every 4 (Four) Hours As Needed for Wheezing. 1 inhaler 11   • ALLERGY SERUM INJECTION 2 injections per week     • fexofenadine (ALLEGRA) 180 MG tablet Take 180 mg by mouth 2 (Two) Times a Day.     • Flaxseed, Linseed, (FLAX SEEDS PO) Take 1 tablet by mouth Daily.     • Multiple Vitamin (MULTI VITAMIN PO) Take 1 tablet by mouth Daily.     • Omega-3 Fatty Acids (FISH OIL PO) Take 1 tablet by mouth daily.     • OMEPRAZOLE PO Take 1 tablet by mouth Daily As Needed.     • Triamcinolone Acetonide (NASACORT AQ NA) 1 spray into each nostril Daily As Needed.       No  "current facility-administered medications on file prior to visit.        Allergies   Allergen Reactions   • Ciprofloxacin Swelling   • Sulfa Antibiotics Mental Status Change   • Macrobid [Nitrofurantoin Macrocrystal] Dizziness       Immunization History   Administered Date(s) Administered   • Tdap 05/01/2008       Objective     /75   Pulse 79   Ht 174 cm (68.5\")   Wt 79.4 kg (175 lb)   SpO2 100%   BMI 26.22 kg/m²     Physical Exam   Constitutional: She is oriented to person, place, and time. She appears well-developed and well-nourished.   HENT:   Head: Normocephalic and atraumatic.   Right Ear: Hearing, tympanic membrane and external ear normal.   Left Ear: Hearing, tympanic membrane and external ear normal.   Nose: Nose normal.   Mouth/Throat: Oropharynx is clear and moist.   Neck: Neck supple. No thyromegaly present.   Cardiovascular: Normal rate, regular rhythm and normal heart sounds.    No murmur heard.  Pulmonary/Chest: Effort normal and breath sounds normal. Right breast exhibits no mass. Left breast exhibits no mass.   Abdominal: Soft. She exhibits no distension. There is no hepatosplenomegaly. There is no tenderness.   Genitourinary: No breast tenderness.   Lymphadenopathy:     She has no cervical adenopathy.   Neurological: She is alert and oriented to person, place, and time.   Skin: Skin is warm and dry.   Psychiatric: She has a normal mood and affect. Her speech is normal and behavior is normal. Judgment and thought content normal. Cognition and memory are normal.       Assessment/Plan   Yi was seen today for annual exam.    Diagnoses and all orders for this visit:    Well adult exam    Other orders  -     aspirin 81 MG tablet; Take 1 tablet by mouth Daily.        Discussion    Patient presents today for a CPE.      Patient follows a healthy diet.   Patient follows an adequate exercise regimen. Mammogram is up to date.   Colon cancer screening is up to date.   Pap smears are performed by " the patient's gynecologist.   DEXA is up to date.    Smoker.  We discussed smoking cessation today.    I have given the patient a packet of papers with handouts from the [place] [facility] and [facility] collaborative which includes the potential benefits and harms of lung cancer screening, the Five Keys for Quitting, A Personalized Quit Plan, Phone and Website resources, and a table on first line medications used for smoking cessation and the 1-800-Quit-Now handout.   Add ASA 81 daily.      Health Maintenance   Topic Date Due   • TDAP/TD VACCINES (2 - Td) 05/01/2018   • MAMMOGRAM  05/12/2018   • PAP SMEAR  11/01/2019   • DXA SCAN  11/06/2019   • COLONOSCOPY  06/10/2023   • PNEUMOCOCCAL VACCINE (19-64 MEDIUM RISK)  Addressed   • HEPATITIS C SCREENING  Completed   • INFLUENZA VACCINE  Excluded            Future Appointments  Date Time Provider Department Center   4/9/2018 8:15 AM [facility]

## 2018-03-27 NOTE — PATIENT INSTRUCTIONS
Food Choices for Gastroesophageal Reflux Disease, Adult  When you have gastroesophageal reflux disease (GERD), the foods you eat and your eating habits are very important. Choosing the right foods can help ease your discomfort.  What guidelines do I need to follow?  · Choose fruits, vegetables, whole grains, and low-fat dairy products.  · Choose low-fat meat, fish, and poultry.  · Limit fats such as oils, salad dressings, butter, nuts, and avocado.  · Keep a food diary. This helps you identify foods that cause symptoms.  · Avoid foods that cause symptoms. These may be different for everyone.  · Eat small meals often instead of 3 large meals a day.  · Eat your meals slowly, in a place where you are relaxed.  · Limit fried foods.  · Cook foods using methods other than frying.  · Avoid drinking alcohol.  · Avoid drinking large amounts of liquids with your meals.  · Avoid bending over or lying down until 2-3 hours after eating.  What foods are not recommended?  These are some foods and drinks that may make your symptoms worse:  Vegetables   Tomatoes. Tomato juice. Tomato and spaghetti sauce. Chili peppers. Onion and garlic. Horseradish.  Fruits   Oranges, grapefruit, and lemon (fruit and juice).  Meats   High-fat meats, fish, and poultry. This includes hot dogs, ribs, ham, sausage, salami, and griffiths.  Dairy   Whole milk and chocolate milk. Sour cream. Cream. Butter. Ice cream. Cream cheese.  Drinks   Coffee and tea. Bubbly (carbonated) drinks or energy drinks.  Condiments   Hot sauce. Barbecue sauce.  Sweets/Desserts   Chocolate and cocoa. Donuts. Peppermint and spearmint.  Fats and Oils   High-fat foods. This includes French fries and potato chips.  Other   Vinegar. Strong spices. This includes black pepper, white pepper, red pepper, cayenne, inman powder, cloves, ginger, and chili powder.  The items listed above may not be a complete list of foods and drinks to avoid. Contact your dietitian for more information.    This information is not intended to replace advice given to you by your health care provider. Make sure you discuss any questions you have with your health care provider.  Document Released: 06/18/2013 Document Revised: 05/25/2017 Document Reviewed: 10/22/2014  Elsevier Interactive Patient Education © 2017 [employer]

## 2018-04-09 ENCOUNTER — APPOINTMENT (OUTPATIENT)
Dept: GENERAL RADIOLOGY | Facility: HOSPITAL | Age: 53
End: 2018-04-09
Attending: SURGERY

## 2018-07-31 ENCOUNTER — HOSPITAL ENCOUNTER (OUTPATIENT)
Dept: GENERAL RADIOLOGY | Facility: HOSPITAL | Age: 53
Discharge: HOME | End: 2018-07-31
Attending: SURGERY | Admitting: SURGERY

## 2018-07-31 DIAGNOSIS — R13.10 DYSPHAGIA, UNSPECIFIED TYPE: ICD-10-CM

## 2018-07-31 PROCEDURE — 74247: CPT

## 2018-07-31 RX ADMIN — ANTACID/ANTIFLATULENT 1 TABLET: 380; 550; 10; 10 GRANULE, EFFERVESCENT ORAL at 08:52

## 2018-07-31 RX ADMIN — BARIUM SULFATE 700 MG: 700 TABLET ORAL at 08:51

## 2018-07-31 RX ADMIN — BARIUM SULFATE 135 ML: 980 POWDER, FOR SUSPENSION ORAL at 08:51

## 2018-07-31 RX ADMIN — BARIUM SULFATE 183 ML: 960 POWDER, FOR SUSPENSION ORAL at 08:51

## 2018-08-02 ENCOUNTER — TELEPHONE (OUTPATIENT)
Dept: SURGERY | Facility: CLINIC | Age: 53
End: 2018-08-02

## 2018-08-02 NOTE — TELEPHONE ENCOUNTER
Spoke with Ms Hurtado regarding her UGI results.  Her symptoms are improved with Prilosec.  Per Dr Greer she does not need an EGD at this time since her symptoms are improved.  Pt informed to call if her symptoms return

## 2018-08-15 ENCOUNTER — TELEPHONE (OUTPATIENT)
Dept: INTERNAL MEDICINE | Facility: CLINIC | Age: 53
End: 2018-08-15

## 2018-10-26 ENCOUNTER — OFFICE VISIT (OUTPATIENT)
Dept: INTERNAL MEDICINE | Facility: CLINIC | Age: 53
End: 2018-10-26

## 2018-10-26 VITALS
WEIGHT: 177 LBS | BODY MASS INDEX: 26.52 KG/M2 | SYSTOLIC BLOOD PRESSURE: 140 MMHG | HEART RATE: 70 BPM | OXYGEN SATURATION: 100 % | DIASTOLIC BLOOD PRESSURE: 74 MMHG

## 2018-10-26 DIAGNOSIS — M54.6 CHRONIC RIGHT-SIDED THORACIC BACK PAIN: Primary | ICD-10-CM

## 2018-10-26 DIAGNOSIS — G89.29 CHRONIC RIGHT-SIDED THORACIC BACK PAIN: Primary | ICD-10-CM

## 2018-10-26 PROCEDURE — 72072 X-RAY EXAM THORAC SPINE 3VWS: CPT | Performed by: INTERNAL MEDICINE

## 2018-10-26 PROCEDURE — 99213 OFFICE O/P EST LOW 20 MIN: CPT | Performed by: INTERNAL MEDICINE

## 2018-10-26 NOTE — PROGRESS NOTES
Subjective     Yi Hurtado is a 53 y.o. female who presents with   Chief Complaint   Patient presents with   • Back Pain     right side  for several months       History of Present Illness     Right sided back pain.  Going on months.  Comes and goes.  No specific injury.  Off and on bother.  No SOA.      Review of Systems   Cardiovascular: Negative for chest pain.       The following portions of the patient's history were reviewed and updated as appropriate: allergies, current medications and problem list.    Patient Active Problem List    Diagnosis Date Noted   • Vitamin D deficiency 03/27/2018   • Mild intermittent asthma without complication 02/06/2017   • Light tobacco smoker <10 cigarettes per day 02/06/2017   • Allergic rhinitis 01/29/2016   • Dysphagia 06/10/2013     Note Last Updated: 3/19/2018     Dr. Leana Greer         Current Outpatient Prescriptions on File Prior to Visit   Medication Sig Dispense Refill   • albuterol (PROVENTIL HFA;VENTOLIN HFA) 108 (90 BASE) MCG/ACT inhaler Inhale 2 puffs Every 4 (Four) Hours As Needed for Wheezing. 1 inhaler 11   • ALLERGY SERUM INJECTION 2 injections per week     • fexofenadine (ALLEGRA) 180 MG tablet Take 180 mg by mouth 2 (Two) Times a Day.     • Flaxseed, Linseed, (FLAX SEEDS PO) Take 1 tablet by mouth Daily.     • Multiple Vitamin (MULTI VITAMIN PO) Take 1 tablet by mouth Daily.     • Omega-3 Fatty Acids (FISH OIL PO) Take 1 tablet by mouth daily.     • Triamcinolone Acetonide (NASACORT AQ NA) 1 spray into each nostril Daily As Needed.     • aspirin 81 MG tablet Take 1 tablet by mouth Daily. 90 tablet 3   • OMEPRAZOLE PO Take 1 tablet by mouth Daily As Needed.       No current facility-administered medications on file prior to visit.        Objective     /74   Pulse 70   Wt 80.3 kg (177 lb)   SpO2 100%   BMI 26.52 kg/m²     Physical Exam   Constitutional: She is oriented to person, place, and time. She appears well-developed and well-nourished.    HENT:   Head: Normocephalic and atraumatic.   Cardiovascular: Normal rate, regular rhythm and normal heart sounds.    Pulmonary/Chest: Effort normal and breath sounds normal.   Musculoskeletal:   Pain between right scapula and shoulder blade   Neurological: She is alert and oriented to person, place, and time.   Skin: Skin is warm and dry.   Psychiatric: She has a normal mood and affect. Her behavior is normal.     X-rays of the thoracic spine  performed today for following indication:   pain.  X-ray reveal nad.  There is no available x-ray for comparison.  X-ray sent to radiology for official interpretation and findings.        Assessment/Plan   Yi was seen today for back pain.    Diagnoses and all orders for this visit:    Chronic right-sided thoracic back pain  -     Cancel: Ambulatory Referral to Thoracic Surgery  -     Ambulatory Referral to Physical Therapy  -     XR Spine Thoracic 3 View (In Office)        Discussion      Patient presents with right sided thoracic pain.  I discussed with the patient a trial of conservative management with:   tylenol and physical therapy.  Let me know if not feeling better over the next several weeks or if there is any change in symptoms.         Future Appointments  Date Time Provider Department Center   4/8/2019 8:00 AM [facility] None   4/12/2019 3:30 PM Thao Vega MD [facility] None

## 2019-04-08 DIAGNOSIS — Z00.00 LABORATORY TESTS ORDERED AS PART OF A COMPLETE PHYSICAL EXAM (CPE): Primary | ICD-10-CM

## 2019-04-08 DIAGNOSIS — N95.1 MENOPAUSAL SYMPTOM: Primary | ICD-10-CM

## 2019-04-12 ENCOUNTER — OFFICE VISIT (OUTPATIENT)
Dept: INTERNAL MEDICINE | Facility: CLINIC | Age: 54
End: 2019-04-12

## 2019-04-12 VITALS
OXYGEN SATURATION: 98 % | SYSTOLIC BLOOD PRESSURE: 132 MMHG | HEIGHT: 68 IN | DIASTOLIC BLOOD PRESSURE: 64 MMHG | WEIGHT: 189 LBS | BODY MASS INDEX: 28.64 KG/M2 | HEART RATE: 68 BPM

## 2019-04-12 DIAGNOSIS — Z00.00 WELL ADULT EXAM: Primary | ICD-10-CM

## 2019-04-12 PROCEDURE — 99396 PREV VISIT EST AGE 40-64: CPT | Performed by: INTERNAL MEDICINE

## 2019-04-12 NOTE — PROGRESS NOTES
Subjective     Yi Hurtado is a 54 y.o. female who presents for a complete physical exam.      History of Present Illness     Reviewed labs which overall look good.     Review of Systems   Constitutional:        Hot flashes   HENT: Negative.    Eyes: Negative.    Respiratory: Negative.    Cardiovascular: Negative.    Gastrointestinal: Negative.    Endocrine: Negative.    Genitourinary: Negative.    Musculoskeletal: Negative.    Skin: Negative.    Allergic/Immunologic: Negative.    Neurological: Negative.    Hematological: Negative.    Psychiatric/Behavioral: Negative.        The following portions of the patient's history were reviewed and updated as appropriate: allergies, current medications, past family history, past medical history, past social history, past surgical history and problem list.  Health maintenance tab was reviewed and updated with the patient.       Patient Active Problem List    Diagnosis Date Noted   • Vitamin D deficiency 03/27/2018   • Mild intermittent asthma without complication 02/06/2017   • Light tobacco smoker <10 cigarettes per day 02/06/2017   • Allergic rhinitis 01/29/2016   • Dysphagia 06/10/2013     Note Last Updated: 3/19/2018     Dr. Leana Greer         Past Medical History:   Diagnosis Date   • Allergic     sulpha, cipro   • Asthma    • Blood tests for routine general physical examination    • Dysphagia 06/10/2013    Dr. Leana Greer   • Earache    • Eustachian tube dysfunction    • Injury of back     lumbar bulging discs, unsure level   • Irritable bowel syndrome    • Myelopathy (CMS/[facility]) 1984    Dr. Ross Fernandez and Dr. Moises Orozco   • Seizures (CMS/[facility])    • Simple goiter    • Spinal stenosis    • Urinary tract infection        Past Surgical History:   Procedure Laterality Date   • APPENDECTOMY N/A 1969   • CATARACT EXTRACTION, BILATERAL Bilateral 3/2017, 4/2017   • ENDOSCOPY AND COLONOSCOPY N/A 06/10/2013    Mild suggestion of a ringed esophagus, biopsied to r/o  eosinophilic esophagitis-Dr. Leana Greer   • MAMMO FINE NEEDLE ASPIRATION UNILATERAL Right 2013    pathology negative   • PILONIDAL CYSTECTOMY N/A    • VAGINAL HYSTERECTOMY SALPINGO OOPHORECTOMY WITH ANTERIOR AND POSTERIOR VAGINAL REPAIR N/A 04/27/2011    Total vaginal hysterectomy, external Andrews's culdoplasty and fulguration of implant of culk-de-sac endometriosis-Dr. Gildardo Howard       Family History   Problem Relation Age of Onset   • Dementia Mother    • Hypothyroidism Mother    • Hypertension Mother    • Diabetes type II Mother    • Stroke Mother    • Thyroid cancer Mother    • Alcohol abuse Father    • Other Father         acute heart attack   • Heart disease Father    • ADD / ADHD Son        Social History     Socioeconomic History   • Marital status:      Spouse name: Angel Luis   • Number of children: 2   • Years of education: Not on file   • Highest education level: Not on file   Occupational History   • Occupation: Accounting     Employer: [employer]   Tobacco Use   • Smoking status: Current Every Day Smoker     Packs/day: 0.50     Years: 39.00     Pack years: 19.50     Types: Cigarettes     Start date: 1/6/1979   • Smokeless tobacco: Never Used   Substance and Sexual Activity   • Alcohol use: Yes     Alcohol/week: 0.6 oz     Types: 1 Standard drinks or equivalent per week   • Sexual activity: Defer       Current Outpatient Medications on File Prior to Visit   Medication Sig Dispense Refill   • albuterol (PROVENTIL HFA;VENTOLIN HFA) 108 (90 BASE) MCG/ACT inhaler Inhale 2 puffs Every 4 (Four) Hours As Needed for Wheezing. 1 inhaler 11   • ALLERGY SERUM INJECTION 2 injections per week     • fexofenadine (ALLEGRA) 180 MG tablet Take 180 mg by mouth 2 (Two) Times a Day.     • Flaxseed, Linseed, (FLAX SEEDS PO) Take 1 tablet by mouth Daily.     • Multiple Vitamin (MULTI VITAMIN PO) Take 1 tablet by mouth Daily.     • Omega-3 Fatty Acids (FISH OIL PO) Take 1 tablet by mouth daily.     •  "Triamcinolone Acetonide (NASACORT AQ NA) 1 spray into each nostril Daily As Needed.     • OMEPRAZOLE PO Take 1 tablet by mouth Daily As Needed.     • [DISCONTINUED] aspirin 81 MG tablet Take 1 tablet by mouth Daily. 90 tablet 3     No current facility-administered medications on file prior to visit.        Allergies   Allergen Reactions   • Ciprofloxacin Swelling   • Sulfa Antibiotics Mental Status Change   • Macrobid [Nitrofurantoin Macrocrystal] Dizziness       Immunization History   Administered Date(s) Administered   • Tdap 05/01/2008       Objective     /64   Pulse 68   Ht 171.5 cm (67.52\")   Wt 85.7 kg (189 lb)   SpO2 98%   BMI 29.15 kg/m²     Physical Exam   Constitutional: She is oriented to person, place, and time. She appears well-developed and well-nourished.   HENT:   Head: Normocephalic and atraumatic.   Right Ear: Hearing, tympanic membrane and external ear normal.   Left Ear: Hearing, tympanic membrane and external ear normal.   Nose: Nose normal.   Mouth/Throat: Oropharynx is clear and moist.   Neck: Neck supple. No thyromegaly present.   Cardiovascular: Normal rate, regular rhythm and normal heart sounds.   No murmur heard.  Pulmonary/Chest: Effort normal and breath sounds normal. Right breast exhibits no mass. Left breast exhibits no mass.   Abdominal: Soft. She exhibits no distension. There is no hepatosplenomegaly. There is no tenderness.   Genitourinary: No breast tenderness.   Lymphadenopathy:     She has no cervical adenopathy.   Neurological: She is alert and oriented to person, place, and time.   Skin: Skin is warm and dry.   Psychiatric: She has a normal mood and affect. Her speech is normal and behavior is normal. Judgment and thought content normal. Cognition and memory are normal.       Assessment/Plan   Yi was seen today for annual exam.    Diagnoses and all orders for this visit:    Well adult exam        Discussion    Patient presents today for a CPE.      Patient follows " a healthy diet.   Patient follows an adequate exercise regimen. Mammogram is up to date.   Colon cancer screening is up to date.   Pap smears are performed by the patient's gynecologist.   Patient will schedule a DEXA.  I have recommended that the patient get the following immunizations:  Shingrix, tdap and hepatitis A. We discussed smoking cessation today.        Health Maintenance   Topic Date Due   • ZOSTER VACCINE (1 of 2) 01/06/2013   • TDAP/TD VACCINES (2 - Td) 05/01/2018   • ANNUAL PHYSICAL  03/28/2019   • DXA SCAN  11/06/2019   • MAMMOGRAM  07/25/2020   • PAP SMEAR  12/01/2020   • COLONOSCOPY  06/10/2023   • HEPATITIS C SCREENING  Completed   • PNEUMOCOCCAL VACCINE (19-64 MEDIUM RISK)  Addressed   • INFLUENZA VACCINE  Discontinued            Future Appointments   Date Time Provider Department Center   4/25/2019  9:30 AM [facility] None   4/10/2020  8:10 AM [facility] None   4/14/2020  3:30 PM Thao Vega MD [facility] None

## 2019-04-12 NOTE — PATIENT INSTRUCTIONS
"DASH Eating Plan  DASH stands for \"Dietary Approaches to Stop Hypertension.\" The DASH eating plan is a healthy eating plan that has been shown to reduce high blood pressure (hypertension). It may also reduce your risk for type 2 diabetes, heart disease, and stroke. The DASH eating plan may also help with weight loss.  What are tips for following this plan?  General guidelines  · Avoid eating more than 2,300 mg (milligrams) of salt (sodium) a day. If you have hypertension, you may need to reduce your sodium intake to 1,500 mg a day.  · Limit alcohol intake to no more than 1 drink a day for nonpregnant women and 2 drinks a day for men. One drink equals 12 oz of beer, 5 oz of wine, or 1½ oz of hard liquor.  · Work with your health care provider to maintain a healthy body weight or to lose weight. Ask what an ideal weight is for you.  · Get at least 30 minutes of exercise that causes your heart to beat faster (aerobic exercise) most days of the week. Activities may include walking, swimming, or biking.  · Work with your health care provider or diet and nutrition specialist (dietitian) to adjust your eating plan to your individual calorie needs.  Reading food labels  · Check food labels for the amount of sodium per serving. Choose foods with less than 5 percent of the Daily Value of sodium. Generally, foods with less than 300 mg of sodium per serving fit into this eating plan.  · To find whole grains, look for the word \"whole\" as the first word in the ingredient list.  Shopping  · Buy products labeled as \"low-sodium\" or \"no salt added.\"  · Buy fresh foods. Avoid canned foods and premade or frozen meals.  Cooking  · Avoid adding salt when cooking. Use salt-free seasonings or herbs instead of table salt or sea salt. Check with your health care provider or pharmacist before using salt substitutes.  · Do not bowman foods. Cook foods using healthy methods such as baking, boiling, grilling, and broiling instead.  · Cook with " heart-healthy oils, such as olive, canola, soybean, or sunflower oil.  Meal planning    · Eat a balanced diet that includes:  ? 5 or more servings of fruits and vegetables each day. At each meal, try to fill half of your plate with fruits and vegetables.  ? Up to 6-8 servings of whole grains each day.  ? Less than 6 oz of lean meat, poultry, or fish each day. A 3-oz serving of meat is about the same size as a deck of cards. One egg equals 1 oz.  ? 2 servings of low-fat dairy each day.  ? A serving of nuts, seeds, or beans 5 times each week.  ? Heart-healthy fats. Healthy fats called Omega-3 fatty acids are found in foods such as flaxseeds and coldwater fish, like sardines, salmon, and mackerel.  · Limit how much you eat of the following:  ? Canned or prepackaged foods.  ? Food that is high in trans fat, such as fried foods.  ? Food that is high in saturated fat, such as fatty meat.  ? Sweets, desserts, sugary drinks, and other foods with added sugar.  ? Full-fat dairy products.  · Do not salt foods before eating.  · Try to eat at least 2 vegetarian meals each week.  · Eat more home-cooked food and less restaurant, buffet, and fast food.  · When eating at a restaurant, ask that your food be prepared with less salt or no salt, if possible.  What foods are recommended?  The items listed may not be a complete list. Talk with your dietitian about what dietary choices are best for you.  Grains  Whole-grain or whole-wheat bread. Whole-grain or whole-wheat pasta. Brown rice. Oatmeal. Quinoa. Bulgur. Whole-grain and low-sodium cereals. Olga bread. Low-fat, low-sodium crackers. Whole-wheat flour tortillas.  Vegetables  Fresh or frozen vegetables (raw, steamed, roasted, or grilled). Low-sodium or reduced-sodium tomato and vegetable juice. Low-sodium or reduced-sodium tomato sauce and tomato paste. Low-sodium or reduced-sodium canned vegetables.  Fruits  All fresh, dried, or frozen fruit. Canned fruit in natural juice (without  added sugar).  Meat and other protein foods  Skinless chicken or turkey. Ground chicken or turkey. Pork with fat trimmed off. Fish and seafood. Egg whites. Dried beans, peas, or lentils. Unsalted nuts, nut butters, and seeds. Unsalted canned beans. Lean cuts of beef with fat trimmed off. Low-sodium, lean deli meat.  Dairy  Low-fat (1%) or fat-free (skim) milk. Fat-free, low-fat, or reduced-fat cheeses. Nonfat, low-sodium ricotta or cottage cheese. Low-fat or nonfat yogurt. Low-fat, low-sodium cheese.  Fats and oils  Soft margarine without trans fats. Vegetable oil. Low-fat, reduced-fat, or light mayonnaise and salad dressings (reduced-sodium). Canola, safflower, olive, soybean, and sunflower oils. Avocado.  Seasoning and other foods  Herbs. Spices. Seasoning mixes without salt. Unsalted popcorn and pretzels. Fat-free sweets.  What foods are not recommended?  The items listed may not be a complete list. Talk with your dietitian about what dietary choices are best for you.  Grains  Baked goods made with fat, such as croissants, muffins, or some breads. Dry pasta or rice meal packs.  Vegetables  Creamed or fried vegetables. Vegetables in a cheese sauce. Regular canned vegetables (not low-sodium or reduced-sodium). Regular canned tomato sauce and paste (not low-sodium or reduced-sodium). Regular tomato and vegetable juice (not low-sodium or reduced-sodium). Pickles. Olives.  Fruits  Canned fruit in a light or heavy syrup. Fried fruit. Fruit in cream or butter sauce.  Meat and other protein foods  Fatty cuts of meat. Ribs. Fried meat. Ervin. Sausage. Bologna and other processed lunch meats. Salami. Fatback. Hotdogs. Bratwurst. Salted nuts and seeds. Canned beans with added salt. Canned or smoked fish. Whole eggs or egg yolks. Chicken or turkey with skin.  Dairy  Whole or 2% milk, cream, and half-and-half. Whole or full-fat cream cheese. Whole-fat or sweetened yogurt. Full-fat cheese. Nondairy creamers. Whipped toppings.  Processed cheese and cheese spreads.  Fats and oils  Butter. Stick margarine. Lard. Shortening. Ghee. Ervin fat. Tropical oils, such as coconut, palm kernel, or palm oil.  Seasoning and other foods  Salted popcorn and pretzels. Onion salt, garlic salt, seasoned salt, table salt, and sea salt. Worcestershire sauce. Tartar sauce. Barbecue sauce. Teriyaki sauce. Soy sauce, including reduced-sodium. Steak sauce. Canned and packaged gravies. Fish sauce. Oyster sauce. Cocktail sauce. Horseradish that you find on the shelf. Ketchup. Mustard. Meat flavorings and tenderizers. Bouillon cubes. Hot sauce and Tabasco sauce. Premade or packaged marinades. Premade or packaged taco seasonings. Relishes. Regular salad dressings.  Where to find more information:  · National Heart, Lung, and Blood [place]: www.nhlbi.nih.gov  · American Heart Association: www.heart.org  Summary  · The DASH eating plan is a healthy eating plan that has been shown to reduce high blood pressure (hypertension). It may also reduce your risk for type 2 diabetes, heart disease, and stroke.  · With the DASH eating plan, you should limit salt (sodium) intake to 2,300 mg a day. If you have hypertension, you may need to reduce your sodium intake to 1,500 mg a day.  · When on the DASH eating plan, aim to eat more fresh fruits and vegetables, whole grains, lean proteins, low-fat dairy, and heart-healthy fats.  · Work with your health care provider or diet and nutrition specialist (dietitian) to adjust your eating plan to your individual calorie needs.  This information is not intended to replace advice given to you by your health care provider. Make sure you discuss any questions you have with your health care provider.  Document Released: 12/06/2012 Document Revised: 12/11/2017 Document Reviewed: 12/11/2017  [employer] Interactive Patient Education © 2019 [employer]

## 2019-04-13 LAB
ALBUMIN SERPL-MCNC: 4.2 G/DL (ref 3.5–5.2)
ALBUMIN/GLOB SERPL: 1.9 G/DL
ALP SERPL-CCNC: 64 U/L (ref 39–117)
ALT SERPL-CCNC: 13 U/L (ref 1–33)
APPEARANCE UR: CLEAR
AST SERPL-CCNC: 12 U/L (ref 1–32)
BACTERIA #/AREA URNS HPF: NORMAL /HPF
BASOPHILS # BLD AUTO: 0.1 10*3/MM3 (ref 0–0.2)
BASOPHILS NFR BLD AUTO: 1.2 % (ref 0–1.5)
BILIRUB SERPL-MCNC: 0.5 MG/DL (ref 0.2–1.2)
BILIRUB UR QL STRIP: NEGATIVE
BUN SERPL-MCNC: 10 MG/DL (ref 6–20)
BUN/CREAT SERPL: 17.9 (ref 7–25)
CALCIUM SERPL-MCNC: 8.8 MG/DL (ref 8.6–10.5)
CASTS URNS MICRO: NORMAL
CHLORIDE SERPL-SCNC: 106 MMOL/L (ref 98–107)
CHOLEST SERPL-MCNC: 199 MG/DL (ref 0–200)
CO2 SERPL-SCNC: 26.4 MMOL/L (ref 22–29)
COLOR UR: YELLOW
CREAT SERPL-MCNC: 0.56 MG/DL (ref 0.57–1)
EOSINOPHIL # BLD AUTO: 0.15 10*3/MM3 (ref 0–0.4)
EOSINOPHIL NFR BLD AUTO: 1.8 % (ref 0.3–6.2)
EPI CELLS #/AREA URNS HPF: NORMAL /HPF
ERYTHROCYTE [DISTWIDTH] IN BLOOD BY AUTOMATED COUNT: 13.6 % (ref 12.3–15.4)
ESTROGEN SERPL-MCNC: 791 PG/ML
FSH SERPL-ACNC: 14.2 MIU/ML
GLOBULIN SER CALC-MCNC: 2.2 GM/DL
GLUCOSE SERPL-MCNC: 93 MG/DL (ref 65–99)
GLUCOSE UR QL: NEGATIVE
HCT VFR BLD AUTO: 40.8 % (ref 34–46.6)
HDLC SERPL-MCNC: 93 MG/DL (ref 40–60)
HGB BLD-MCNC: 12.7 G/DL (ref 12–15.9)
HGB UR QL STRIP: NEGATIVE
IMM GRANULOCYTES # BLD AUTO: 0.04 10*3/MM3 (ref 0–0.05)
IMM GRANULOCYTES NFR BLD AUTO: 0.5 % (ref 0–0.5)
KETONES UR QL STRIP: NEGATIVE
LDLC SERPL CALC-MCNC: 95 MG/DL (ref 0–100)
LEUKOCYTE ESTERASE UR QL STRIP: NEGATIVE
LYMPHOCYTES # BLD AUTO: 2.07 10*3/MM3 (ref 0.7–3.1)
LYMPHOCYTES NFR BLD AUTO: 25.3 % (ref 19.6–45.3)
Lab: NORMAL
MCH RBC QN AUTO: 29.3 PG (ref 26.6–33)
MCHC RBC AUTO-ENTMCNC: 31.1 G/DL (ref 31.5–35.7)
MCV RBC AUTO: 94.2 FL (ref 79–97)
MONOCYTES # BLD AUTO: 0.69 10*3/MM3 (ref 0.1–0.9)
MONOCYTES NFR BLD AUTO: 8.4 % (ref 5–12)
NEUTROPHILS # BLD AUTO: 5.12 10*3/MM3 (ref 1.4–7)
NEUTROPHILS NFR BLD AUTO: 62.8 % (ref 42.7–76)
NITRITE UR QL STRIP: NEGATIVE
NRBC BLD AUTO-RTO: 0 /100 WBC (ref 0–0)
PH UR STRIP: 6.5 [PH] (ref 5–8)
PLATELET # BLD AUTO: 322 10*3/MM3 (ref 140–450)
POTASSIUM SERPL-SCNC: 4.7 MMOL/L (ref 3.5–5.2)
PROT SERPL-MCNC: 6.4 G/DL (ref 6–8.5)
PROT UR QL STRIP: NEGATIVE
RBC # BLD AUTO: 4.33 10*6/MM3 (ref 3.77–5.28)
RBC #/AREA URNS HPF: NORMAL /HPF
SODIUM SERPL-SCNC: 141 MMOL/L (ref 136–145)
SP GR UR: 1.01 (ref 1–1.03)
T4 FREE SERPL-MCNC: NORMAL NG/DL
TRIGL SERPL-MCNC: 56 MG/DL (ref 0–150)
TSH SERPL DL<=0.005 MIU/L-ACNC: 1.25 MIU/ML (ref 0.27–4.2)
UROBILINOGEN UR STRIP-MCNC: (no result) MG/DL
VLDLC SERPL CALC-MCNC: 11.2 MG/DL
WBC # BLD AUTO: 8.17 10*3/MM3 (ref 3.4–10.8)
WBC #/AREA URNS HPF: NORMAL /HPF
WRITTEN AUTHORIZATION: NORMAL

## 2019-07-24 ENCOUNTER — OFFICE VISIT (OUTPATIENT)
Dept: INTERNAL MEDICINE | Facility: CLINIC | Age: 54
End: 2019-07-24

## 2019-07-24 VITALS
HEIGHT: 68 IN | WEIGHT: 188 LBS | HEART RATE: 72 BPM | OXYGEN SATURATION: 98 % | BODY MASS INDEX: 28.49 KG/M2 | DIASTOLIC BLOOD PRESSURE: 84 MMHG | SYSTOLIC BLOOD PRESSURE: 110 MMHG

## 2019-07-24 DIAGNOSIS — B86 SCABIES: Primary | ICD-10-CM

## 2019-07-24 PROCEDURE — 99213 OFFICE O/P EST LOW 20 MIN: CPT | Performed by: FAMILY MEDICINE

## 2019-07-24 RX ORDER — PERMETHRIN 50 MG/G
CREAM TOPICAL
Refills: 0 | COMMUNITY
Start: 2019-07-20 | End: 2020-07-22

## 2019-07-24 NOTE — PROGRESS NOTES
"Subjective   Yi Hurtado is a 54 y.o. female. CC: rash    History of Present Illness     Yi is a 54 year old female who comes in today because of a rash.   First noted this over the weekend.  Her daughter is a PA and was visiting and told her that it was scabies and recommended the use of Permethrin to treat this.  She did do one application.  The rash is better but not gone.  Still having a lot of itching associated with it.      The following portions of the patient's history were reviewed and updated as appropriate: allergies, current medications, past family history, past medical history, past social history and problem list.    Review of Systems   Constitutional: Negative for chills, diaphoresis, fatigue and fever.   HENT: Negative.  Negative for congestion.    Respiratory: Negative.    Cardiovascular: Negative.    Gastrointestinal: Negative.    Skin: Positive for rash (as described in HPI).       Objective   Physical Exam   Constitutional: She is oriented to person, place, and time. She appears well-developed and well-nourished. No distress.   Neurological: She is alert and oriented to person, place, and time.   Skin: Skin is warm and dry. Rash (typical burrows of scabies noted on arms, mainly on the right arm) noted. She is not diaphoretic.   Psychiatric: She has a normal mood and affect. Her behavior is normal.   Nursing note and vitals reviewed.    Vitals:    07/24/19 0913   BP: 110/84   Pulse: 72   SpO2: 98%   Weight: 85.3 kg (188 lb)   Height: 171.5 cm (67.52\")     Assessment/Plan   Yi was seen today for rash.    Diagnoses and all orders for this visit:    Scabies    Repeat Permethrin for second treatment.  Instructed on how to use and how to apply it.  If no results with this, recommend that she see dermatologist.           "

## 2019-09-27 ENCOUNTER — OFFICE VISIT (OUTPATIENT)
Dept: INTERNAL MEDICINE | Facility: CLINIC | Age: 54
End: 2019-09-27

## 2019-09-27 VITALS
DIASTOLIC BLOOD PRESSURE: 82 MMHG | BODY MASS INDEX: 28.49 KG/M2 | HEART RATE: 86 BPM | SYSTOLIC BLOOD PRESSURE: 110 MMHG | HEIGHT: 68 IN | OXYGEN SATURATION: 98 % | WEIGHT: 188 LBS

## 2019-09-27 DIAGNOSIS — M79.642 LEFT HAND PAIN: Primary | ICD-10-CM

## 2019-09-27 PROCEDURE — 73130 X-RAY EXAM OF HAND: CPT | Performed by: INTERNAL MEDICINE

## 2019-09-27 PROCEDURE — 99213 OFFICE O/P EST LOW 20 MIN: CPT | Performed by: INTERNAL MEDICINE

## 2019-09-27 NOTE — PROGRESS NOTES
"Subjective     Yi Hurtado is a 54 y.o. female who presents with   Chief Complaint   Patient presents with   • Hand Pain       History of Present Illness     Hand pain since fall.  She fell and slipped in the bathroom.  Advil helpful.  Off and on pain for since two weeks ago when she fell.    Review of Systems   Respiratory: Negative.    Cardiovascular: Negative.        The following portions of the patient's history were reviewed and updated as appropriate: allergies, current medications and problem list.    Patient Active Problem List    Diagnosis Date Noted   • Vitamin D deficiency 03/27/2018   • Mild intermittent asthma without complication 02/06/2017   • Light tobacco smoker <10 cigarettes per day 02/06/2017   • Allergic rhinitis 01/29/2016   • Dysphagia 06/10/2013     Note Last Updated: 3/19/2018     Dr. Leana Greer         Current Outpatient Medications on File Prior to Visit   Medication Sig Dispense Refill   • albuterol (PROVENTIL HFA;VENTOLIN HFA) 108 (90 BASE) MCG/ACT inhaler Inhale 2 puffs Every 4 (Four) Hours As Needed for Wheezing. 1 inhaler 11   • ALLERGY SERUM INJECTION 2 injections per week     • aspirin 81 MG tablet Take 1 tablet by mouth Daily. 90 tablet 0   • fexofenadine (ALLEGRA) 180 MG tablet Take 180 mg by mouth 2 (Two) Times a Day.     • Flaxseed, Linseed, (FLAX SEEDS PO) Take 1 tablet by mouth Daily.     • Multiple Vitamin (MULTI VITAMIN PO) Take 1 tablet by mouth Daily.     • Omega-3 Fatty Acids (FISH OIL PO) Take 1 tablet by mouth daily.     • OMEPRAZOLE PO Take 1 tablet by mouth Daily As Needed.     • permethrin (ELIMITE) 5 % cream APPLY HEAD TO TOE FOR 8 TO 12 HOURS AS DIRECTED. MAY REPEAT  0   • Triamcinolone Acetonide (NASACORT AQ NA) 1 spray into each nostril Daily As Needed.       No current facility-administered medications on file prior to visit.        Objective     /82   Pulse 86   Ht 171.5 cm (67.52\")   Wt 85.3 kg (188 lb)   SpO2 98%   BMI 28.99 kg/m²     Physical " Exam   Constitutional: She is oriented to person, place, and time. She appears well-developed and well-nourished.   HENT:   Head: Normocephalic and atraumatic.   Pulmonary/Chest: Effort normal.   Musculoskeletal:        Left hand: She exhibits normal range of motion, no tenderness, no bony tenderness and no swelling.   Neurological: She is alert and oriented to person, place, and time.   Psychiatric: She has a normal mood and affect. Her behavior is normal.     X-rays of the hand  performed today for following indication:   pain.  X-ray reveal nad.  There is no available x-ray for comparison.  X-ray sent to radiology for official interpretation and findings.        Assessment/Plan   Yi was seen today for hand pain.    Diagnoses and all orders for this visit:    Left hand pain  -     XR Hand 3+ View Left (In Office)        Discussion    Patients presents with left hand pain after recent fall.  Continue OTC ibuprofen as needed.  Let me know if not feeling better over the next several weeks or if there is any change in symptoms.  I would refer to hand doctor.         Future Appointments   Date Time Provider Department Center   4/10/2020  8:10 AM [facility] None   4/14/2020  3:30 PM Thao Vega [facility] None

## 2019-10-11 RX ORDER — ASPIRIN 81 MG/1
TABLET, CHEWABLE ORAL
Qty: 90 TABLET | Refills: 0 | Status: SHIPPED | OUTPATIENT
Start: 2019-10-11 | End: 2020-07-22

## 2019-11-18 ENCOUNTER — OFFICE VISIT (OUTPATIENT)
Dept: INTERNAL MEDICINE | Facility: CLINIC | Age: 54
End: 2019-11-18

## 2019-11-18 VITALS
HEART RATE: 66 BPM | RESPIRATION RATE: 18 BRPM | BODY MASS INDEX: 28.64 KG/M2 | TEMPERATURE: 98.4 F | WEIGHT: 189 LBS | DIASTOLIC BLOOD PRESSURE: 82 MMHG | OXYGEN SATURATION: 97 % | SYSTOLIC BLOOD PRESSURE: 130 MMHG | HEIGHT: 68 IN

## 2019-11-18 DIAGNOSIS — N39.0 ACUTE UTI: Primary | ICD-10-CM

## 2019-11-18 DIAGNOSIS — M79.645 CHRONIC THUMB PAIN, LEFT: ICD-10-CM

## 2019-11-18 DIAGNOSIS — R30.0 DYSURIA: ICD-10-CM

## 2019-11-18 DIAGNOSIS — G89.29 CHRONIC THUMB PAIN, LEFT: ICD-10-CM

## 2019-11-18 LAB
BILIRUB BLD-MCNC: NEGATIVE MG/DL
CLARITY, POC: CLEAR
COLOR UR: YELLOW
GLUCOSE UR STRIP-MCNC: NEGATIVE MG/DL
KETONES UR QL: NEGATIVE
LEUKOCYTE EST, POC: ABNORMAL
NITRITE UR-MCNC: NEGATIVE MG/ML
PH UR: 6 [PH] (ref 5–8)
PROT UR STRIP-MCNC: NEGATIVE MG/DL
RBC # UR STRIP: NEGATIVE /UL
SP GR UR: 1.01 (ref 1–1.03)
UROBILINOGEN UR QL: NORMAL

## 2019-11-18 PROCEDURE — 81003 URINALYSIS AUTO W/O SCOPE: CPT | Performed by: NURSE PRACTITIONER

## 2019-11-18 PROCEDURE — 99214 OFFICE O/P EST MOD 30 MIN: CPT | Performed by: NURSE PRACTITIONER

## 2019-11-18 RX ORDER — AMOXICILLIN 875 MG/1
875 TABLET, COATED ORAL 2 TIMES DAILY
Qty: 20 TABLET | Refills: 0 | Status: SHIPPED | OUTPATIENT
Start: 2019-11-18 | End: 2019-12-04

## 2019-11-18 NOTE — PROGRESS NOTES
Subjective   Yi Hurtado is a 54 y.o. female. Patient is here today for   Chief Complaint   Patient presents with   • Urinary Frequency     and burning x 4-5 days           Vitals:    11/18/19 0916   BP: 130/82   Pulse: 66   Resp: 18   Temp: 98.4 °F (36.9 °C)   SpO2: 97%     Body mass index is 29.15 kg/m².  The following portions of the patient's history were reviewed and updated as appropriate: allergies, current medications, past family history, past medical history, past social history, past surgical history and problem list.    Past Medical History:   Diagnosis Date   • Allergic     sulpha, cipro   • Asthma    • Blood tests for routine general physical examination    • Dysphagia 06/10/2013    Dr. Leana Greer   • Earache    • Eustachian tube dysfunction    • Injury of back     lumbar bulging discs, unsure level   • Irritable bowel syndrome    • Myelopathy (CMS/[facility]) 1984    Dr. Ross Feranndez and Dr. Moises Orozco   • Seizures (CMS/[facility])    • Simple goiter    • Spinal stenosis    • Urinary tract infection       Allergies   Allergen Reactions   • Ciprofloxacin Swelling   • Sulfa Antibiotics Mental Status Change   • Macrobid [Nitrofurantoin Macrocrystal] Dizziness      Social History     Socioeconomic History   • Marital status:      Spouse name: Angel Luis   • Number of children: 2   • Years of education: Not on file   • Highest education level: Not on file   Occupational History   • Occupation: Accounting     Employer: [employer]   Tobacco Use   • Smoking status: Current Every Day Smoker     Packs/day: 0.50     Years: 39.00     Pack years: 19.50     Types: Cigarettes     Start date: 1/6/1979   • Smokeless tobacco: Never Used   Substance and Sexual Activity   • Alcohol use: Yes     Alcohol/week: 0.6 oz     Types: 1 Standard drinks or equivalent per week   • Sexual activity: Defer        Current Outpatient Medications:   •  albuterol (PROVENTIL HFA;VENTOLIN HFA) 108 (90 BASE) MCG/ACT inhaler,  Inhale 2 puffs Every 4 (Four) Hours As Needed for Wheezing., Disp: 1 inhaler, Rfl: 11  •  ALLERGY SERUM INJECTION, 2 injections per week, Disp: , Rfl:   •  aspirin 81 MG chewable tablet, TAKE 1 TABLET BY MOUTH EVERY DAY, Disp: 90 tablet, Rfl: 0  •  fexofenadine (ALLEGRA) 180 MG tablet, Take 180 mg by mouth 2 (Two) Times a Day., Disp: , Rfl:   •  Flaxseed, Linseed, (FLAX SEEDS PO), Take 1 tablet by mouth Daily., Disp: , Rfl:   •  Multiple Vitamin (MULTI VITAMIN PO), Take 1 tablet by mouth Daily., Disp: , Rfl:   •  Omega-3 Fatty Acids (FISH OIL PO), Take 1 tablet by mouth daily., Disp: , Rfl:   •  OMEPRAZOLE PO, Take 1 tablet by mouth Daily As Needed., Disp: , Rfl:   •  permethrin (ELIMITE) 5 % cream, APPLY HEAD TO TOE FOR 8 TO 12 HOURS AS DIRECTED. MAY REPEAT, Disp: , Rfl: 0  •  Triamcinolone Acetonide (NASACORT AQ NA), 1 spray into each nostril Daily As Needed., Disp: , Rfl:   •  amoxicillin (AMOXIL) 875 MG tablet, Take 1 tablet by mouth 2 (Two) Times a Day. For 10 days. Take with food, Disp: 20 tablet, Rfl: 0     Corie Richmond is a patient of Dr Vega who is here for an acute visit. She c/o dysuria. She also has had left thumb pain for the last few months. She saw Dr Vega in September and an xray was done. 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 She would like a referral to hand surgery. This is a new problem to me.       Urinary Tract Infection    This is a new problem. The current episode started in the past 7 days. The problem occurs every urination. The problem has been unchanged. The pain is mild. There has been no fever. Associated symptoms include frequency and urgency. Pertinent negatives include no chills, discharge, flank pain, hematuria, hesitancy or nausea. Associated symptoms comments: Urine odor . She has tried nothing for the symptoms.         Review of Systems   Constitutional: Negative for chills and fatigue.   Respiratory: Negative.    Cardiovascular: Negative.     Gastrointestinal: Negative for nausea.   Genitourinary: Positive for difficulty urinating, dysuria, frequency and urgency. Negative for flank pain, hematuria, hesitancy, pelvic pain, vaginal discharge and vaginal pain.   Musculoskeletal: Positive for arthralgias (right hand/thumb pain ) and back pain.       Physical Exam   Constitutional: Vital signs are normal. She appears well-developed and well-nourished. She does not appear ill. No distress.   HENT:   Head: Normocephalic.   Cardiovascular: Normal rate and regular rhythm.   Pulmonary/Chest: Effort normal and breath sounds normal.   Abdominal: Soft. Bowel sounds are normal. There is tenderness in the suprapubic area. There is no CVA tenderness.   Neurological: She is alert.   Skin: Skin is warm, dry and intact.   Psychiatric: She has a normal mood and affect.     Brief Urine Lab Results  (Last result in the past 365 days)      Color   Clarity   Blood   Leuk Est   Nitrite   Protein   CREAT   Urine HCG        11/18/19 0925 Yellow Clear Negative Large (3+) Negative Negative                 ASSESSMENT     Problem List Items Addressed This Visit     None      Visit Diagnoses     Acute UTI    -  Primary    Relevant Medications    amoxicillin (AMOXIL) 875 MG tablet    Other Relevant Orders    Urine Culture - Urine, Urine, Clean Catch    Dysuria        Relevant Orders    POC Urinalysis Dipstick, Automated (Completed)    Chronic thumb pain, left        Relevant Orders    Ambulatory Referral to Hand Surgery          PLAN  1. UTI - UA showed large leuks, will start amoxil and send a culture. Will call with results. She is allergic to macrobid, Bactrim, and cipro. She has tolerated amoxil in the past  Rest and drink plenty of fluids   Avoid caffeine  Empty your bladder frequently  Follow up if your symptoms persist past 7 days or sooner if your symptoms worsen or if you develop new symptoms  Advised to go to the ER for high fever, severe abdominal pain/low back pain,  weakness, or lethargy   2. Left thumb pain - xray has been done but the pain has been persistent. Will refer to hand surgery for further evaluation   Follow up as needed   Return if symptoms worsen or fail to improve.

## 2019-11-21 ENCOUNTER — TELEPHONE (OUTPATIENT)
Dept: INTERNAL MEDICINE | Facility: CLINIC | Age: 54
End: 2019-11-21

## 2019-11-21 LAB
BACTERIA UR CULT: ABNORMAL
BACTERIA UR CULT: ABNORMAL
OTHER ANTIBIOTIC SUSC ISLT: ABNORMAL

## 2019-11-21 NOTE — TELEPHONE ENCOUNTER
I called the patient and notified her of the results. Patient understood.       ----- Message from LILIA Archer sent at 11/21/2019  2:28 PM EST -----  Urine was positive for UTI, she can finish the course of amoxil   Recommend rechecking the urine in 2 weeks (lab only)

## 2019-12-04 ENCOUNTER — OFFICE VISIT (OUTPATIENT)
Dept: INTERNAL MEDICINE | Facility: CLINIC | Age: 54
End: 2019-12-04

## 2019-12-04 VITALS
BODY MASS INDEX: 28.64 KG/M2 | HEIGHT: 68 IN | DIASTOLIC BLOOD PRESSURE: 84 MMHG | HEART RATE: 80 BPM | WEIGHT: 189 LBS | SYSTOLIC BLOOD PRESSURE: 122 MMHG | OXYGEN SATURATION: 98 %

## 2019-12-04 DIAGNOSIS — R35.0 URINARY FREQUENCY: Primary | ICD-10-CM

## 2019-12-04 LAB
BILIRUB BLD-MCNC: NEGATIVE MG/DL
CLARITY, POC: CLEAR
COLOR UR: YELLOW
GLUCOSE UR STRIP-MCNC: NEGATIVE MG/DL
KETONES UR QL: NEGATIVE
LEUKOCYTE EST, POC: NEGATIVE
NITRITE UR-MCNC: NEGATIVE MG/ML
PH UR: 6 [PH] (ref 5–8)
PROT UR STRIP-MCNC: NEGATIVE MG/DL
RBC # UR STRIP: NEGATIVE /UL
SP GR UR: 1.01 (ref 1–1.03)
UROBILINOGEN UR QL: NORMAL

## 2019-12-04 PROCEDURE — 81003 URINALYSIS AUTO W/O SCOPE: CPT | Performed by: INTERNAL MEDICINE

## 2019-12-04 PROCEDURE — 99213 OFFICE O/P EST LOW 20 MIN: CPT | Performed by: INTERNAL MEDICINE

## 2019-12-04 NOTE — PROGRESS NOTES
Subjective     Yi Hurtado is a 54 y.o. female who presents with   Chief Complaint   Patient presents with   • Urinary Frequency       History of Present Illness     Treated on 11/18 for a UTI.  Symptoms resolved with  Amoxicillin.  Symptoms came back Sunday with foul smell.  Dysuria is associated.  No fever.  No abd pain.  No back.      Review of Systems   Respiratory: Negative.    Cardiovascular: Negative.    Musculoskeletal: Negative.        The following portions of the patient's history were reviewed and updated as appropriate: allergies, current medications and problem list.    Patient Active Problem List    Diagnosis Date Noted   • Vitamin D deficiency 03/27/2018   • Mild intermittent asthma without complication 02/06/2017   • Light tobacco smoker <10 cigarettes per day 02/06/2017   • Allergic rhinitis 01/29/2016   • Dysphagia 06/10/2013     Note Last Updated: 3/19/2018     Dr. Leana Greer         Current Outpatient Medications on File Prior to Visit   Medication Sig Dispense Refill   • albuterol (PROVENTIL HFA;VENTOLIN HFA) 108 (90 BASE) MCG/ACT inhaler Inhale 2 puffs Every 4 (Four) Hours As Needed for Wheezing. 1 inhaler 11   • ALLERGY SERUM INJECTION 2 injections per week     • aspirin 81 MG chewable tablet TAKE 1 TABLET BY MOUTH EVERY DAY 90 tablet 0   • fexofenadine (ALLEGRA) 180 MG tablet Take 180 mg by mouth 2 (Two) Times a Day.     • Flaxseed, Linseed, (FLAX SEEDS PO) Take 1 tablet by mouth Daily.     • Multiple Vitamin (MULTI VITAMIN PO) Take 1 tablet by mouth Daily.     • Omega-3 Fatty Acids (FISH OIL PO) Take 1 tablet by mouth daily.     • OMEPRAZOLE PO Take 1 tablet by mouth Daily As Needed.     • permethrin (ELIMITE) 5 % cream APPLY HEAD TO TOE FOR 8 TO 12 HOURS AS DIRECTED. MAY REPEAT  0   • Triamcinolone Acetonide (NASACORT AQ NA) 1 spray into each nostril Daily As Needed.     • [DISCONTINUED] amoxicillin (AMOXIL) 875 MG tablet Take 1 tablet by mouth 2 (Two) Times a Day. For 10 days. Take  "with food 20 tablet 0     No current facility-administered medications on file prior to visit.        Objective     /84   Pulse 80   Ht 171.5 cm (67.52\")   Wt 85.7 kg (189 lb)   SpO2 98%   BMI 29.15 kg/m²     Physical Exam   Constitutional: She appears well-developed and well-nourished.   HENT:   Head: Normocephalic and atraumatic.   Pulmonary/Chest: Effort normal.   Abdominal: Soft. She exhibits no distension and no mass. There is no tenderness. There is no rebound and no guarding.   Neurological: She is alert.       Assessment/Plan   Yi was seen today for urinary frequency.    Diagnoses and all orders for this visit:    Urinary frequency  -     POC Urinalysis Dipstick, Automated  -     Urine Culture - Urine, Urine, Clean Catch        Discussion  Patient with recent UTI presents with recurrent urinary symptoms.  Urine dip was negative.  We will send for culture and treat based on result.  In the interim she will increase fluids, take azo and drink cranberry juice.        Future Appointments   Date Time Provider Department Center   12/10/2019  9:20 AM [facility] None   4/10/2020  8:10 AM [facility] None   4/14/2020  3:30 PM Thao Vega MD [facility] None         "

## 2019-12-06 DIAGNOSIS — N39.0 URINARY TRACT INFECTION WITHOUT HEMATURIA, SITE UNSPECIFIED: Primary | ICD-10-CM

## 2019-12-07 LAB
BACTERIA UR CULT: ABNORMAL
BACTERIA UR CULT: ABNORMAL
OTHER ANTIBIOTIC SUSC ISLT: ABNORMAL

## 2019-12-09 ENCOUNTER — TELEPHONE (OUTPATIENT)
Dept: INTERNAL MEDICINE | Facility: CLINIC | Age: 54
End: 2019-12-09

## 2019-12-09 RX ORDER — CEFDINIR 300 MG/1
300 CAPSULE ORAL 2 TIMES DAILY
Qty: 14 CAPSULE | Refills: 0 | Status: SHIPPED | OUTPATIENT
Start: 2019-12-09 | End: 2020-03-09

## 2019-12-09 NOTE — TELEPHONE ENCOUNTER
Patient called and stated that she received your voice mail and she stated that she is still experiencing symptoms.

## 2020-03-09 ENCOUNTER — OFFICE VISIT (OUTPATIENT)
Dept: INTERNAL MEDICINE | Facility: CLINIC | Age: 55
End: 2020-03-09

## 2020-03-09 VITALS
RESPIRATION RATE: 16 BRPM | SYSTOLIC BLOOD PRESSURE: 128 MMHG | TEMPERATURE: 99.4 F | DIASTOLIC BLOOD PRESSURE: 78 MMHG | BODY MASS INDEX: 28.19 KG/M2 | HEIGHT: 68 IN | HEART RATE: 76 BPM | WEIGHT: 186 LBS | OXYGEN SATURATION: 99 %

## 2020-03-09 DIAGNOSIS — J06.9 ACUTE URI: ICD-10-CM

## 2020-03-09 DIAGNOSIS — R50.9 FEVER, UNSPECIFIED FEVER CAUSE: Primary | ICD-10-CM

## 2020-03-09 LAB
EXPIRATION DATE: NORMAL
FLUAV AG NPH QL: NEGATIVE
FLUBV AG NPH QL: NEGATIVE
HCT VFR BLDA CALC: 39 % (ref 38–51)
HGB BLDA-MCNC: 12.1 G/DL (ref 12–17)
INTERNAL CONTROL: NORMAL
Lab: NORMAL
MCH, POC: 28.3
MCHC, POC: 31
MCV, POC: 91.2
PLATELET # BLD: 247 10*3/MM3
PMV BLD: 7.6 FL
RBC, POC: 4.27
RDW, POC: 13.2
WBC # BLD: 6.4 10*3/UL

## 2020-03-09 PROCEDURE — 85025 COMPLETE CBC W/AUTO DIFF WBC: CPT | Performed by: NURSE PRACTITIONER

## 2020-03-09 PROCEDURE — 87804 INFLUENZA ASSAY W/OPTIC: CPT | Performed by: NURSE PRACTITIONER

## 2020-03-09 PROCEDURE — 99213 OFFICE O/P EST LOW 20 MIN: CPT | Performed by: NURSE PRACTITIONER

## 2020-03-09 RX ORDER — AMOXICILLIN AND CLAVULANATE POTASSIUM 875; 125 MG/1; MG/1
TABLET, FILM COATED ORAL
COMMUNITY
Start: 2020-03-07 | End: 2020-04-22

## 2020-03-09 RX ORDER — ALBUTEROL SULFATE 90 UG/1
2 AEROSOL, METERED RESPIRATORY (INHALATION) EVERY 4 HOURS PRN
Qty: 1 INHALER | Refills: 3 | Status: SHIPPED | OUTPATIENT
Start: 2020-03-09

## 2020-03-09 NOTE — PROGRESS NOTES
Subjective   Yi Hurtado is a 55 y.o. female.   Chief Complaint   Patient presents with   • Shortness of Breath   • Cough   • Fever     for 4 hours yesterday      Vitals:    03/09/20 1439   BP: 128/78   Pulse: 76   Resp: 16   Temp: 99.4 °F (37.4 °C)   SpO2: 99%     No LMP recorded.    Yi is a patient of Dr Vega who is here for an acute visit. She saw her allergist on Saturday morning for congestion for 2 days and eye redness. He thought it was due to a sinus infection so he started her on augmentin. She developed a fever, cough and shortness of breath 2 days ago. She has a history of asthma.        The following portions of the patient's history were reviewed and updated as appropriate: allergies, current medications, past family history, past medical history, past social history, past surgical history and problem list.    Review of Systems   Constitutional: Positive for chills, fatigue and fever.   HENT: Positive for congestion, postnasal drip and rhinorrhea.    Respiratory: Positive for shortness of breath. Negative for chest tightness and wheezing.    Cardiovascular: Negative.    Gastrointestinal: Negative.    Musculoskeletal: Negative for arthralgias.       Objective   Physical Exam   Constitutional: Vital signs are normal. She appears well-developed and well-nourished. No distress.   HENT:   Head: Normocephalic.   Right Ear: Tympanic membrane and ear canal normal.   Left Ear: Tympanic membrane and ear canal normal.   Nose: Rhinorrhea present. No mucosal edema. Right sinus exhibits no maxillary sinus tenderness and no frontal sinus tenderness. Left sinus exhibits no maxillary sinus tenderness and no frontal sinus tenderness.   Mouth/Throat: Uvula is midline. Posterior oropharyngeal erythema present.   Cardiovascular: Normal rate, regular rhythm and normal heart sounds.   Pulmonary/Chest: Effort normal and breath sounds normal.   Neurological: She is alert.       Assessment/Plan   Yi was seen today for  shortness of breath, cough and fever.    Diagnoses and all orders for this visit:    Fever, unspecified fever cause  -     POC Influenza A / B  -     POC CBC With / Auto Diff    Acute URI    Other orders  -     albuterol sulfate  (90 Base) MCG/ACT inhaler; Inhale 2 puffs Every 4 (Four) Hours As Needed for Wheezing or Shortness of Air.      Cbc is normal  Flu swab is negative  Discussed with the patient that this is likely viral but to Finish course of augmentin as prescribed by her allergist   Albuterol as needed  Follow up if symptoms persist, worsen or if new symptoms develop

## 2020-04-22 ENCOUNTER — OFFICE VISIT (OUTPATIENT)
Dept: INTERNAL MEDICINE | Facility: CLINIC | Age: 55
End: 2020-04-22

## 2020-04-22 VITALS — WEIGHT: 186 LBS | HEIGHT: 68 IN | BODY MASS INDEX: 28.19 KG/M2

## 2020-04-22 DIAGNOSIS — N30.00 ACUTE CYSTITIS WITHOUT HEMATURIA: Primary | ICD-10-CM

## 2020-04-22 PROCEDURE — 99442 PR PHYS/QHP TELEPHONE EVALUATION 11-20 MIN: CPT | Performed by: INTERNAL MEDICINE

## 2020-04-22 RX ORDER — CEFUROXIME AXETIL 250 MG/1
250 TABLET ORAL 2 TIMES DAILY
Qty: 14 TABLET | Refills: 0 | Status: SHIPPED | OUTPATIENT
Start: 2020-04-22 | End: 2020-04-29

## 2020-04-22 NOTE — PROGRESS NOTES
"Urinary Tract Infection (burning and frequency when urinating x 2 weeks )    You have chosen to receive care through a telephone visit. Do you consent to use a telephone visit for your medical care today? Yes    HPI  Yi Hurtado is a 55 y.o. female presents in acute care via telephone link in [facility]. This visit is occurring during the COVID 19 pandemic.    Notes that \"I think I have a urinary tract infection\".  Has had sx of frequency, odor, and burning with urination.  Sx have been going on for last 2 weeks or so. Was really trying to avoid coming in with COVID issues.  No fevers. Notes has some mild abdominal pain between belly button and hip  'that I usually get when has bladder infections'.  Gets UTI's somewhat frequently.  'I seem to get them frequently.  Will get them in 'spells'.  Last time had one was late 2019. No blood in urine.   Has some Abx allergies.   Notes has been trying to treat it herself with drinking lots of water and cranberry juice.  Really is not much better.     Review of Systems   Constitution: Negative for chills and fever.   Skin: Negative for rash.   Gastrointestinal: Positive for abdominal pain (focal, typical for pt when had 'bladder infections' in the past). Negative for nausea and vomiting.   Genitourinary: Positive for dysuria and frequency. Negative for flank pain and hematuria.       The following portions of the patient's history were reviewed and updated as appropriate: allergies, current medications, past medical history, past social history and problem list.      Current Outpatient Medications:   •  albuterol sulfate  (90 Base) MCG/ACT inhaler, Inhale 2 puffs Every 4 (Four) Hours As Needed for Wheezing or Shortness of Air., Disp: 1 inhaler, Rfl: 3  •  ALLERGY SERUM INJECTION, 2 injections per week, Disp: , Rfl:   •  aspirin 81 MG chewable tablet, TAKE 1 TABLET BY MOUTH EVERY DAY, Disp: 90 tablet, Rfl: 0  •  fexofenadine (ALLEGRA) 180 MG tablet, Take 180 mg by mouth 2 " "(Two) Times a Day., Disp: , Rfl:   •  Flaxseed, Linseed, (FLAX SEEDS PO), Take 1 tablet by mouth Daily., Disp: , Rfl:   •  Multiple Vitamin (MULTI VITAMIN PO), Take 1 tablet by mouth Daily., Disp: , Rfl:   •  Omega-3 Fatty Acids (FISH OIL PO), Take 1 tablet by mouth daily., Disp: , Rfl:   •  OMEPRAZOLE PO, Take 1 tablet by mouth Daily As Needed., Disp: , Rfl:   •  permethrin (ELIMITE) 5 % cream, APPLY HEAD TO TOE FOR 8 TO 12 HOURS AS DIRECTED. MAY REPEAT, Disp: , Rfl: 0  •  Triamcinolone Acetonide (NASACORT AQ NA), 1 spray into each nostril Daily As Needed., Disp: , Rfl:   •  cefuroxime (Ceftin) 250 MG tablet, Take 1 tablet by mouth 2 (Two) Times a Day for 7 days., Disp: 14 tablet, Rfl: 0    Vitals:    04/22/20 1411   Weight: 84.4 kg (186 lb)   Height: 171.5 cm (67.52\")     Body mass index is 28.68 kg/m².      Physical Exam   Constitutional: She is oriented to person, place, and time.   Neurological: She is alert and oriented to person, place, and time.   Psychiatric: She has a normal mood and affect. Thought content normal.       Assessment/ Plan  Diagnoses and all orders for this visit:    Acute cystitis without hematuria  -     cefuroxime (Ceftin) 250 MG tablet; Take 1 tablet by mouth 2 (Two) Times a Day for 7 days.        Return for Next scheduled follow up.      Discussion:  Yi Hurtado is a 55 y.o. female presents in acute care (new issue and patient to examiner) via telephone link in Epic, pt did not have available video link. This visit is occurring during the COVID 19 pandemic.    Pt with UTI sx for ~2 weeks, delayed tx over concern for COVID issues.  Has typical sx for her, with noted 'spells' of UTI's in past with last in late 2019.  No fever or flank pain to suggest pyelonephritis. Will tx pt empirically today, noting COVID restrictions and using pan sensitive E.coli on dual cx data from 12/2019 as guide. Will start Ceftin 250mg BID x 7 days. Longer duration due to pt hx of recurrent UTI and hx of MARY " with vaginal repair.  Pt to call if sx do not improve and will need cx data +/- exam at that time.      RTC as planned.     Total time of visit ~15 minutes.

## 2020-07-16 DIAGNOSIS — E55.9 VITAMIN D DEFICIENCY: ICD-10-CM

## 2020-07-16 DIAGNOSIS — Z00.00 HEALTH CARE MAINTENANCE: Primary | ICD-10-CM

## 2020-07-18 LAB
25(OH)D3+25(OH)D2 SERPL-MCNC: 25.1 NG/ML (ref 30–100)
ALBUMIN SERPL-MCNC: 4.4 G/DL (ref 3.5–5.2)
ALBUMIN/GLOB SERPL: 1.9 G/DL
ALP SERPL-CCNC: 77 U/L (ref 39–117)
ALT SERPL-CCNC: 10 U/L (ref 1–33)
AST SERPL-CCNC: 16 U/L (ref 1–32)
BASOPHILS # BLD AUTO: 0.1 10*3/MM3 (ref 0–0.2)
BASOPHILS NFR BLD AUTO: 1.2 % (ref 0–1.5)
BILIRUB SERPL-MCNC: 0.4 MG/DL (ref 0–1.2)
BUN SERPL-MCNC: 12 MG/DL (ref 6–20)
BUN/CREAT SERPL: 19.4 (ref 7–25)
CALCIUM SERPL-MCNC: 9.3 MG/DL (ref 8.6–10.5)
CHLORIDE SERPL-SCNC: 105 MMOL/L (ref 98–107)
CHOLEST SERPL-MCNC: 203 MG/DL (ref 0–200)
CO2 SERPL-SCNC: 27.2 MMOL/L (ref 22–29)
CREAT SERPL-MCNC: 0.62 MG/DL (ref 0.57–1)
EOSINOPHIL # BLD AUTO: 0.13 10*3/MM3 (ref 0–0.4)
EOSINOPHIL NFR BLD AUTO: 1.6 % (ref 0.3–6.2)
ERYTHROCYTE [DISTWIDTH] IN BLOOD BY AUTOMATED COUNT: 12.8 % (ref 12.3–15.4)
GLOBULIN SER CALC-MCNC: 2.3 GM/DL
GLUCOSE SERPL-MCNC: 98 MG/DL (ref 65–99)
HCT VFR BLD AUTO: 38 % (ref 34–46.6)
HDLC SERPL-MCNC: 83 MG/DL (ref 40–60)
HGB BLD-MCNC: 12.9 G/DL (ref 12–15.9)
IMM GRANULOCYTES # BLD AUTO: 0.02 10*3/MM3 (ref 0–0.05)
IMM GRANULOCYTES NFR BLD AUTO: 0.2 % (ref 0–0.5)
LDLC SERPL CALC-MCNC: 107 MG/DL (ref 0–100)
LYMPHOCYTES # BLD AUTO: 2.84 10*3/MM3 (ref 0.7–3.1)
LYMPHOCYTES NFR BLD AUTO: 34 % (ref 19.6–45.3)
MCH RBC QN AUTO: 29.3 PG (ref 26.6–33)
MCHC RBC AUTO-ENTMCNC: 33.9 G/DL (ref 31.5–35.7)
MCV RBC AUTO: 86.4 FL (ref 79–97)
MONOCYTES # BLD AUTO: 0.7 10*3/MM3 (ref 0.1–0.9)
MONOCYTES NFR BLD AUTO: 8.4 % (ref 5–12)
NEUTROPHILS # BLD AUTO: 4.57 10*3/MM3 (ref 1.7–7)
NEUTROPHILS NFR BLD AUTO: 54.6 % (ref 42.7–76)
NRBC BLD AUTO-RTO: 0 /100 WBC (ref 0–0.2)
PLATELET # BLD AUTO: 312 10*3/MM3 (ref 140–450)
POTASSIUM SERPL-SCNC: 4.3 MMOL/L (ref 3.5–5.2)
PROT SERPL-MCNC: 6.7 G/DL (ref 6–8.5)
RBC # BLD AUTO: 4.4 10*6/MM3 (ref 3.77–5.28)
SODIUM SERPL-SCNC: 141 MMOL/L (ref 136–145)
TRIGL SERPL-MCNC: 63 MG/DL (ref 0–150)
TSH SERPL DL<=0.005 MIU/L-ACNC: 0.89 UIU/ML (ref 0.27–4.2)
UNABLE TO VOID: NORMAL
VLDLC SERPL CALC-MCNC: 12.6 MG/DL
WBC # BLD AUTO: 8.36 10*3/MM3 (ref 3.4–10.8)

## 2020-07-22 ENCOUNTER — OFFICE VISIT (OUTPATIENT)
Dept: INTERNAL MEDICINE | Facility: CLINIC | Age: 55
End: 2020-07-22

## 2020-07-22 VITALS
WEIGHT: 192 LBS | TEMPERATURE: 97.3 F | HEART RATE: 65 BPM | BODY MASS INDEX: 29.1 KG/M2 | OXYGEN SATURATION: 98 % | HEIGHT: 68 IN | SYSTOLIC BLOOD PRESSURE: 138 MMHG | DIASTOLIC BLOOD PRESSURE: 82 MMHG | RESPIRATION RATE: 16 BRPM

## 2020-07-22 DIAGNOSIS — Z00.00 WELL ADULT EXAM: Primary | ICD-10-CM

## 2020-07-22 DIAGNOSIS — Z00.00 HEALTH CARE MAINTENANCE: Primary | ICD-10-CM

## 2020-07-22 DIAGNOSIS — Z20.822 ENCOUNTER FOR LABORATORY TESTING FOR COVID-19 VIRUS: ICD-10-CM

## 2020-07-22 PROCEDURE — 99396 PREV VISIT EST AGE 40-64: CPT | Performed by: INTERNAL MEDICINE

## 2020-07-22 PROCEDURE — 90732 PPSV23 VACC 2 YRS+ SUBQ/IM: CPT | Performed by: INTERNAL MEDICINE

## 2020-07-22 PROCEDURE — 90471 IMMUNIZATION ADMIN: CPT | Performed by: INTERNAL MEDICINE

## 2020-07-22 NOTE — PROGRESS NOTES
Subjective     Yi Hurtado is a 55 y.o. female who presents for a complete physical exam.      History of Present Illness     HLD.  Mild increase in cholesterol with good ratios.     Review of Systems   Constitutional: Negative.    HENT: Negative.    Eyes: Negative.    Respiratory: Negative.    Cardiovascular: Negative.    Gastrointestinal: Negative.    Endocrine: Negative.    Genitourinary: Negative.    Musculoskeletal: Positive for arthralgias.   Skin: Negative.    Allergic/Immunologic: Negative.    Neurological: Negative.    Hematological: Negative.    Psychiatric/Behavioral: Negative.        The following portions of the patient's history were reviewed and updated as appropriate: allergies, current medications, past family history, past medical history, past social history, past surgical history and problem list.  Health maintenance tab was reviewed and updated with the patient.       Patient Active Problem List    Diagnosis Date Noted   • Vitamin D deficiency 03/27/2018   • Mild intermittent asthma without complication 02/06/2017   • Light tobacco smoker <10 cigarettes per day 02/06/2017   • Allergic rhinitis 01/29/2016   • Dysphagia 06/10/2013     Note Last Updated: 3/19/2018     Dr. Leana Greer         Past Medical History:   Diagnosis Date   • Allergic     sulpha, cipro   • Asthma    • Blood tests for routine general physical examination    • Dysphagia 06/10/2013    Dr. Leana Greer   • Earache    • Eustachian tube dysfunction    • Injury of back     lumbar bulging discs, unsure level   • Irritable bowel syndrome    • Myelopathy (CMS/[facility]) 1984    Dr. Ross Fernandez and Dr. Moises Orozco   • Seizures (CMS/[facility])    • Simple goiter    • Spinal stenosis    • Urinary tract infection        Past Surgical History:   Procedure Laterality Date   • APPENDECTOMY N/A 1969   • CATARACT EXTRACTION, BILATERAL Bilateral 3/2017, 4/2017   • ENDOSCOPY AND COLONOSCOPY N/A 06/10/2013    Mild suggestion of a ringed esophagus,  biopsied to r/o eosinophilic esophagitis-Dr. Leana Greer   • MAMMO FINE NEEDLE ASPIRATION UNILATERAL Right 2013    pathology negative   • PILONIDAL CYSTECTOMY N/A    • VAGINAL HYSTERECTOMY SALPINGO OOPHORECTOMY WITH ANTERIOR AND POSTERIOR VAGINAL REPAIR N/A 04/27/2011    Total vaginal hysterectomy, external Andrews's culdoplasty and fulguration of implant of culk-de-sac endometriosis-Dr. Gildardo Howard       Family History   Problem Relation Age of Onset   • Dementia Mother    • Hypothyroidism Mother    • Hypertension Mother    • Diabetes type II Mother    • Stroke Mother    • Thyroid cancer Mother    • Alcohol abuse Father    • Other Father         acute heart attack   • Heart disease Father    • ADD / ADHD Son        Social History     Socioeconomic History   • Marital status:      Spouse name: Angel Luis   • Number of children: 2   • Years of education: Not on file   • Highest education level: Not on file   Occupational History   • Occupation: Accounting     Employer: [employer]   Tobacco Use   • Smoking status: Current Every Day Smoker     Packs/day: 0.50     Years: 39.00     Pack years: 19.50     Types: Cigarettes     Start date: 1/6/1979   • Smokeless tobacco: Never Used   Substance and Sexual Activity   • Alcohol use: Yes     Alcohol/week: 1.0 standard drinks     Types: 1 Standard drinks or equivalent per week   • Sexual activity: Defer       Current Outpatient Medications on File Prior to Visit   Medication Sig Dispense Refill   • albuterol sulfate  (90 Base) MCG/ACT inhaler Inhale 2 puffs Every 4 (Four) Hours As Needed for Wheezing or Shortness of Air. 1 inhaler 3   • ALLERGY SERUM INJECTION 2 injections per week     • fexofenadine (ALLEGRA) 180 MG tablet Take 180 mg by mouth 2 (Two) Times a Day.     • Flaxseed, Linseed, (FLAX SEEDS PO) Take 1 tablet by mouth Daily.     • Multiple Vitamin (MULTI VITAMIN PO) Take 1 tablet by mouth Daily.     • Omega-3 Fatty Acids (FISH OIL PO) Take  "1 tablet by mouth daily.     • OMEPRAZOLE PO Take 1 tablet by mouth Daily As Needed.     • Triamcinolone Acetonide (NASACORT AQ NA) 1 spray into each nostril Daily As Needed.     • [DISCONTINUED] aspirin 81 MG chewable tablet TAKE 1 TABLET BY MOUTH EVERY DAY 90 tablet 0   • [DISCONTINUED] permethrin (ELIMITE) 5 % cream APPLY HEAD TO TOE FOR 8 TO 12 HOURS AS DIRECTED. MAY REPEAT  0     No current facility-administered medications on file prior to visit.        Allergies   Allergen Reactions   • Ciprofloxacin Swelling   • Sulfa Antibiotics Mental Status Change   • Macrobid [Nitrofurantoin Macrocrystal] Dizziness       Immunization History   Administered Date(s) Administered   • Tdap 05/01/2008       Objective     /82   Pulse 65   Temp 97.3 °F (36.3 °C) (Temporal)   Resp 16   Ht 171.5 cm (67.52\")   Wt 87.1 kg (192 lb)   SpO2 98%   BMI 29.61 kg/m²     Physical Exam   Constitutional: She is oriented to person, place, and time. She appears well-developed and well-nourished.   HENT:   Head: Normocephalic and atraumatic.   Right Ear: Hearing, tympanic membrane and external ear normal.   Left Ear: Hearing, tympanic membrane and external ear normal.   Nose: Nose normal.   Mouth/Throat: Oropharynx is clear and moist.   Neck: Neck supple. No thyromegaly present.   Cardiovascular: Normal rate, regular rhythm and normal heart sounds.   No murmur heard.  Pulmonary/Chest: Effort normal and breath sounds normal. Right breast exhibits no mass. Left breast exhibits no mass. No breast tenderness.   Abdominal: Soft. She exhibits no distension. There is no hepatosplenomegaly. There is no tenderness.   Genitourinary: No breast tenderness.   Lymphadenopathy:     She has no cervical adenopathy.   Neurological: She is alert and oriented to person, place, and time.   Skin: Skin is warm and dry.   Psychiatric: She has a normal mood and affect. Her speech is normal and behavior is normal. Judgment and thought content normal. " Cognition and memory are normal.       Assessment/Plan   Yi was seen today for annual exam.    Diagnoses and all orders for this visit:    Well adult exam        Discussion    Patient presents today for a CPE.      Patient follows a healthy diet.   Mammogram is up to date.   Colon cancer screening is up to date.   Pap smears are performed by the patient's gynecologist.   Immunizations are as per orders.   DEXA is up to date.    I have recommended that the patient get the following immunizations:  Shingrix and tdap.      Health Maintenance   Topic Date Due   • ZOSTER VACCINE (1 of 2) 01/06/2013   • TDAP/TD VACCINES (2 - Td) 05/01/2018   • PAP SMEAR  12/01/2020   • ANNUAL PHYSICAL  07/23/2021   • MAMMOGRAM  08/12/2021   • COLONOSCOPY  06/10/2023   • DXA SCAN  04/25/2024   • HEPATITIS C SCREENING  Completed   • PNEUMOCOCCAL VACCINE (19-64 MEDIUM RISK)  Addressed   • INFLUENZA VACCINE  Discontinued            No future appointments.

## 2020-07-23 LAB
APPEARANCE UR: CLEAR
BACTERIA #/AREA URNS HPF: NORMAL /HPF
BILIRUB UR QL STRIP: NEGATIVE
CASTS URNS MICRO: NORMAL
COLOR UR: YELLOW
EPI CELLS #/AREA URNS HPF: NORMAL /HPF
GLUCOSE UR QL: NEGATIVE
HGB UR QL STRIP: NEGATIVE
KETONES UR QL STRIP: NEGATIVE
LEUKOCYTE ESTERASE UR QL STRIP: NEGATIVE
NITRITE UR QL STRIP: NEGATIVE
PH UR STRIP: 7.5 [PH] (ref 5–8)
PROT UR QL STRIP: NEGATIVE
RBC #/AREA URNS HPF: NORMAL /HPF
SARS-COV-2 AB SERPL QL IA: NEGATIVE
SP GR UR: NORMAL (ref 1–1.03)
UROBILINOGEN UR STRIP-MCNC: NORMAL MG/DL
WBC #/AREA URNS HPF: NORMAL /HPF

## 2020-08-04 ENCOUNTER — LAB (OUTPATIENT)
Dept: INTERNAL MEDICINE | Facility: CLINIC | Age: 55
End: 2020-08-04

## 2020-08-04 PROCEDURE — 90471 IMMUNIZATION ADMIN: CPT | Performed by: INTERNAL MEDICINE

## 2020-08-04 PROCEDURE — 90715 TDAP VACCINE 7 YRS/> IM: CPT | Performed by: INTERNAL MEDICINE

## 2021-01-06 ENCOUNTER — TELEPHONE (OUTPATIENT)
Dept: INTERNAL MEDICINE | Facility: CLINIC | Age: 56
End: 2021-01-06

## 2021-01-06 NOTE — TELEPHONE ENCOUNTER
Patient is wanting to get the covid vaccine and is wondering what Dr. Vega's opinion on this is, considering the patients pre existing condition.

## 2021-02-22 ENCOUNTER — IMMUNIZATION (OUTPATIENT)
Dept: VACCINE CLINIC | Facility: HOSPITAL | Age: 56
End: 2021-02-22

## 2021-02-22 PROCEDURE — 91300 HC SARSCOV02 VAC 30MCG/0.3ML IM: CPT | Performed by: INTERNAL MEDICINE

## 2021-02-22 PROCEDURE — 0001A: CPT | Performed by: INTERNAL MEDICINE

## 2021-03-15 ENCOUNTER — IMMUNIZATION (OUTPATIENT)
Dept: VACCINE CLINIC | Facility: HOSPITAL | Age: 56
End: 2021-03-15

## 2021-03-15 PROCEDURE — 91300 HC SARSCOV02 VAC 30MCG/0.3ML IM: CPT | Performed by: INTERNAL MEDICINE

## 2021-03-15 PROCEDURE — 0002A: CPT | Performed by: INTERNAL MEDICINE

## 2021-08-16 ENCOUNTER — OFFICE VISIT (OUTPATIENT)
Dept: INTERNAL MEDICINE | Facility: CLINIC | Age: 56
End: 2021-08-16

## 2021-08-16 VITALS
HEIGHT: 69 IN | BODY MASS INDEX: 27.7 KG/M2 | WEIGHT: 187 LBS | OXYGEN SATURATION: 98 % | HEART RATE: 58 BPM | SYSTOLIC BLOOD PRESSURE: 132 MMHG | RESPIRATION RATE: 18 BRPM | DIASTOLIC BLOOD PRESSURE: 74 MMHG

## 2021-08-16 DIAGNOSIS — T14.8XXA SUPERFICIAL INJURY OF SKIN: ICD-10-CM
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
## 2021-08-16 DIAGNOSIS — R52 PAIN: ICD-10-CM

## 2021-08-16 DIAGNOSIS — M79.89 SWELLING OF DIGIT OF LEFT HAND: Primary | ICD-10-CM

## 2021-08-16 PROCEDURE — 99213 OFFICE O/P EST LOW 20 MIN: CPT | Performed by: INTERNAL MEDICINE

## 2021-08-16 NOTE — PROGRESS NOTES
"Hand Pain (L, from fall 2 days) and Wrist Injury (L)      HPI  Yi Hrutado is a 56 y.o. female RTC in acute care:   Fell 2 days ago on curb, \"I was wearing my Birkenstocks\".  Fell onto L hand and then elbows. Scraped L cheek.  No LOC.  Was able to get up immediately.  Started with swelling quickly on hand. Did ice hand.  Has come down in swelling rapidly in last 48 hours. Has full ROM and 'I can type\" still.    Using Ibuprofen.  \"Only when I have to\".      Review of Systems   Constitutional: Negative for chills and fever.   Skin: Positive for color change (bruising on L hand and L forearm). Negative for poor wound healing, rash and suspicious lesions.   Musculoskeletal: Positive for falls (accidental), joint pain (L hand) and joint swelling (MCP joints L hand laterally). Negative for muscle weakness.       The following portions of the patient's history were reviewed and updated as appropriate: allergies, current medications, past medical history, past social history and problem list.      Current Outpatient Medications:   •  albuterol sulfate  (90 Base) MCG/ACT inhaler, Inhale 2 puffs Every 4 (Four) Hours As Needed for Wheezing or Shortness of Air., Disp: 1 inhaler, Rfl: 3  •  ALLERGY SERUM INJECTION, 2 injections per week, Disp: , Rfl:   •  ciclopirox (PENLAC) 8 % solution, APPLY TOPICALLY TO THE APPROPRIATE AREA AS DIRECTED EVERY NIGHT., Disp: 6.6 mL, Rfl: 1  •  fexofenadine (ALLEGRA) 180 MG tablet, Take 180 mg by mouth 2 (Two) Times a Day., Disp: , Rfl:   •  Flaxseed, Linseed, (FLAX SEEDS PO), Take 1 tablet by mouth Daily., Disp: , Rfl:   •  Multiple Vitamin (MULTI VITAMIN PO), Take 1 tablet by mouth Daily., Disp: , Rfl:   •  Omega-3 Fatty Acids (FISH OIL PO), Take 1 tablet by mouth daily., Disp: , Rfl:   •  Triamcinolone Acetonide (NASACORT AQ NA), 1 spray into each nostril Daily As Needed., Disp: , Rfl:     Vitals:    08/16/21 1553   BP: 132/74   Pulse: 58   Resp: 18   SpO2: 98%   Weight: 84.8 kg (187 " "lb)   Height: 174 cm (68.5\")     Body mass index is 28.02 kg/m².      Physical Exam  Vitals reviewed.   Constitutional:       General: She is not in acute distress.     Appearance: She is well-developed. She is not ill-appearing or toxic-appearing.   HENT:      Head: Normocephalic.   Pulmonary:      Effort: Pulmonary effort is normal. No respiratory distress.   Musculoskeletal:      Right elbow: No swelling or lacerations (scrape, superficial). Normal range of motion.      Left elbow: No swelling or lacerations (scrape, superficial). Normal range of motion.      Left wrist: No swelling, deformity, tenderness, bony tenderness, snuff box tenderness or crepitus. Normal range of motion.      Right hand: No tenderness or bony tenderness. Normal range of motion. Normal strength. Normal pulse.      Left hand: Deformity (atrophy over thenar region, baseline per pt) and tenderness (over soft tissues of lateral dorsal hand) present. No bony tenderness. Normal range of motion. Normal strength. Normal pulse.   Skin:     Findings: Bruising (L forearm medial, mid-forearm) present. No rash.             Comments: No pus or induration at superficial scrape sites.    Neurological:      Mental Status: She is alert and oriented to person, place, and time.      Gait: Gait normal.   Psychiatric:         Behavior: Behavior normal.         Thought Content: Thought content normal.       XR L hand: L hand pain, fall; No prior for comparison  No fracture  Normal alignment  No joint space narrowing noted.       Assessment/ Plan  Diagnoses and all orders for this visit:    Swelling of digit of left hand    Pain  -     XR Hand 3+ View Left; Future    Accidental fall, initial encounter    Superficial injury of skin        Return for Next scheduled follow up.      Discussion:  Yi Hurtado is a 56 y.o. female RTC in acute care (new issue to examiner) ~48 hours after accidental fall onto outstretched L hand, then to B elbows. 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  XR in office negative for fx today. FROM on exam other than baseline atrophy and weakness at L thumb. Reassured pt no fx seen on XR.  PRN Ibuprofen only. C/W topical Abx ointment and soap and water clean to scrape sites on hands/ elbows.  Call if fails to continue to improve.

## 2021-08-23 ENCOUNTER — OFFICE VISIT (OUTPATIENT)
Dept: INTERNAL MEDICINE | Facility: CLINIC | Age: 56
End: 2021-08-23

## 2021-08-23 VITALS
DIASTOLIC BLOOD PRESSURE: 82 MMHG | HEIGHT: 69 IN | BODY MASS INDEX: 27.7 KG/M2 | SYSTOLIC BLOOD PRESSURE: 124 MMHG | WEIGHT: 187 LBS | HEART RATE: 75 BPM | OXYGEN SATURATION: 99 %

## 2021-08-23 DIAGNOSIS — N30.00 ACUTE CYSTITIS WITHOUT HEMATURIA: Primary | ICD-10-CM

## 2021-08-23 LAB
BILIRUB BLD-MCNC: NEGATIVE MG/DL
CLARITY, POC: CLEAR
COLOR UR: YELLOW
GLUCOSE UR STRIP-MCNC: NEGATIVE MG/DL
KETONES UR QL: NEGATIVE
LEUKOCYTE EST, POC: ABNORMAL
NITRITE UR-MCNC: NEGATIVE MG/ML
PH UR: 6.5 [PH] (ref 5–8)
PROT UR STRIP-MCNC: NEGATIVE MG/DL
RBC # UR STRIP: ABNORMAL /UL
SP GR UR: 1.01 (ref 1–1.03)
UROBILINOGEN UR QL: NORMAL

## 2021-08-23 PROCEDURE — 99213 OFFICE O/P EST LOW 20 MIN: CPT | Performed by: INTERNAL MEDICINE

## 2021-08-23 PROCEDURE — 81003 URINALYSIS AUTO W/O SCOPE: CPT | Performed by: INTERNAL MEDICINE

## 2021-08-23 RX ORDER — CEPHALEXIN 500 MG/1
500 CAPSULE ORAL 2 TIMES DAILY
Qty: 14 CAPSULE | Refills: 0 | Status: SHIPPED | OUTPATIENT
Start: 2021-08-23 | End: 2021-09-20

## 2021-08-23 NOTE — PROGRESS NOTES
"Subjective     Yi Hurtado is a 56 y.o. female who presents with   Chief Complaint   Patient presents with   • Urinary Frequency       History of Present Illness     Symptoms started about a week ago.  Urgency and frequency.  Burning is associated.  Foul smell is associated.      Review of Systems   Constitutional: Negative for fever.       The following portions of the patient's history were reviewed and updated as appropriate: allergies, current medications and problem list.    Patient Active Problem List    Diagnosis Date Noted   • Vitamin D deficiency 03/27/2018   • Mild intermittent asthma without complication 02/06/2017   • Light tobacco smoker <10 cigarettes per day 02/06/2017   • Allergic rhinitis 01/29/2016   • Dysphagia 06/10/2013     Note Last Updated: 3/19/2018     Dr. Leana Greer         Current Outpatient Medications on File Prior to Visit   Medication Sig Dispense Refill   • albuterol sulfate  (90 Base) MCG/ACT inhaler Inhale 2 puffs Every 4 (Four) Hours As Needed for Wheezing or Shortness of Air. 1 inhaler 3   • ALLERGY SERUM INJECTION 2 injections per week     • ciclopirox (PENLAC) 8 % solution APPLY TOPICALLY TO THE APPROPRIATE AREA AS DIRECTED EVERY NIGHT. 6.6 mL 1   • fexofenadine (ALLEGRA) 180 MG tablet Take 180 mg by mouth 2 (Two) Times a Day.     • Flaxseed, Linseed, (FLAX SEEDS PO) Take 1 tablet by mouth Daily.     • Multiple Vitamin (MULTI VITAMIN PO) Take 1 tablet by mouth Daily.     • Omega-3 Fatty Acids (FISH OIL PO) Take 1 tablet by mouth daily.     • Triamcinolone Acetonide (NASACORT AQ NA) 1 spray into each nostril Daily As Needed.       No current facility-administered medications on file prior to visit.       Objective     /82   Pulse 75   Ht 174 cm (68.5\")   Wt 84.8 kg (187 lb)   SpO2 99%   BMI 28.02 kg/m²     Physical Exam  Constitutional:       Appearance: She is well-developed.   HENT:      Head: Normocephalic and atraumatic.   Pulmonary:      Effort: " Pulmonary effort is normal.   Neurological:      Mental Status: She is alert and oriented to person, place, and time.   Psychiatric:         Behavior: Behavior normal.         Assessment/Plan   Diagnoses and all orders for this visit:    1. Acute cystitis without hematuria (Primary)    Other orders  -     cephalexin (Keflex) 500 MG capsule; Take 1 capsule by mouth 2 (Two) Times a Day.  Dispense: 14 capsule; Refill: 0        Discussion    Patient presents with symptoms of acute cystitis.  Urine dip is positive for markers of infection.  We will send for culture and follow up on that.  Prescription for antibiotics is sent in.  The patient is instructed to let our office know if not feeling better over the next several days or if there is any change in symptoms.           No future appointments.

## 2021-08-26 LAB
BACTERIA UR CULT: ABNORMAL
BACTERIA UR CULT: ABNORMAL
OTHER ANTIBIOTIC SUSC ISLT: ABNORMAL

## 2021-09-20 ENCOUNTER — OFFICE VISIT (OUTPATIENT)
Dept: INTERNAL MEDICINE | Facility: CLINIC | Age: 56
End: 2021-09-20

## 2021-09-20 VITALS
BODY MASS INDEX: 27.27 KG/M2 | DIASTOLIC BLOOD PRESSURE: 72 MMHG | WEIGHT: 182 LBS | SYSTOLIC BLOOD PRESSURE: 122 MMHG | HEART RATE: 71 BPM

## 2021-09-20 DIAGNOSIS — N39.0 ACUTE UTI: ICD-10-CM

## 2021-09-20 DIAGNOSIS — R35.0 URINE FREQUENCY: Primary | ICD-10-CM

## 2021-09-20 LAB
BILIRUB BLD-MCNC: NEGATIVE MG/DL
CLARITY, POC: CLEAR
COLOR UR: YELLOW
GLUCOSE UR STRIP-MCNC: NEGATIVE MG/DL
KETONES UR QL: NEGATIVE
LEUKOCYTE EST, POC: ABNORMAL
NITRITE UR-MCNC: NEGATIVE MG/ML
PH UR: 7 [PH] (ref 5–8)
PROT UR STRIP-MCNC: NEGATIVE MG/DL
RBC # UR STRIP: ABNORMAL /UL
SP GR UR: 1.01 (ref 1–1.03)
UROBILINOGEN UR QL: NORMAL

## 2021-09-20 PROCEDURE — 99213 OFFICE O/P EST LOW 20 MIN: CPT | Performed by: INTERNAL MEDICINE

## 2021-09-20 PROCEDURE — 81003 URINALYSIS AUTO W/O SCOPE: CPT | Performed by: INTERNAL MEDICINE

## 2021-09-20 RX ORDER — AMOXICILLIN 500 MG/1
500 CAPSULE ORAL 2 TIMES DAILY
Qty: 14 CAPSULE | Refills: 0 | Status: SHIPPED | OUTPATIENT
Start: 2021-09-20 | End: 2021-10-22

## 2021-09-20 NOTE — PROGRESS NOTES
Chief Complaint   Patient presents with   • Nocturia   • Urinary Frequency       History of Present Illness   Yi Hurtado is a 56 y.o. female presents for acute care. Patient reports urinary frequency and urgency. Symptoms started about a month ago. Initial improvement. Then had symptoms recur. She reports no increase or relation to sexual activity. She has constipation not loose stooling. She did have a pansensitive e coli on culture in august. She does have large leukocytes in the urine today. She is drinking cranberry juice and has increased hydration w/ water.         The following portions of the patient's history were reviewed and updated as appropriate: allergies, current medications, past family history, past medical history, past social history, past surgical history and problem list.  Current Outpatient Medications on File Prior to Visit   Medication Sig Dispense Refill   • albuterol sulfate  (90 Base) MCG/ACT inhaler Inhale 2 puffs Every 4 (Four) Hours As Needed for Wheezing or Shortness of Air. 1 inhaler 3   • fexofenadine (ALLEGRA) 180 MG tablet Take 180 mg by mouth 2 (Two) Times a Day.     • Flaxseed, Linseed, (FLAX SEEDS PO) Take 1 tablet by mouth Daily.     • Multiple Vitamin (MULTI VITAMIN PO) Take 1 tablet by mouth Daily.     • Omega-3 Fatty Acids (FISH OIL PO) Take 1 tablet by mouth daily.     • Triamcinolone Acetonide (NASACORT AQ NA) 1 spray into each nostril Daily As Needed.     • ALLERGY SERUM INJECTION 2 injections per week     • ciclopirox (PENLAC) 8 % solution APPLY TOPICALLY TO THE APPROPRIATE AREA AS DIRECTED EVERY NIGHT. 6.6 mL 1   • [DISCONTINUED] cephalexin (Keflex) 500 MG capsule Take 1 capsule by mouth 2 (Two) Times a Day. 14 capsule 0     No current facility-administered medications on file prior to visit.     Review of Systems   Constitutional: Negative.    HENT: Negative.    Eyes: Negative.    Respiratory: Negative.    Cardiovascular: Negative.    Gastrointestinal:  Positive for constipation.   Endocrine: Negative.    Genitourinary: Positive for frequency and urgency.   Musculoskeletal: Negative.    Skin: Negative.    Allergic/Immunologic: Negative.    Neurological: Negative.    Hematological: Negative.    Psychiatric/Behavioral: Negative.        Objective   Physical Exam  Vitals and nursing note reviewed.   Constitutional:       Appearance: Normal appearance.   HENT:      Head: Normocephalic and atraumatic.      Right Ear: Tympanic membrane normal.      Left Ear: Tympanic membrane normal.      Nose: Nose normal.      Mouth/Throat:      Mouth: Mucous membranes are moist.   Eyes:      Extraocular Movements: Extraocular movements intact.      Pupils: Pupils are equal, round, and reactive to light.   Cardiovascular:      Rate and Rhythm: Normal rate and regular rhythm.      Pulses: Normal pulses.      Heart sounds: Normal heart sounds.   Pulmonary:      Effort: Pulmonary effort is normal.      Breath sounds: Normal breath sounds.   Abdominal:      General: Abdomen is flat.      Palpations: Abdomen is soft.   Musculoskeletal:         General: Normal range of motion.      Cervical back: Normal range of motion.   Skin:     General: Skin is warm and dry.   Neurological:      General: No focal deficit present.      Mental Status: She is alert and oriented to person, place, and time.   Psychiatric:         Mood and Affect: Mood normal.         Behavior: Behavior normal.         Thought Content: Thought content normal.         Judgment: Judgment normal.          /72   Pulse 71   Wt 82.6 kg (182 lb)   BMI 27.27 kg/m²     Assessment/Plan   Diagnoses and all orders for this visit:    Urine frequency  -     POC Urinalysis Dipstick, Automated    Acute UTI  -     Cancel: Urine Culture - Urine, Urine, Clean Catch; Future  -     Urine Culture - Urine, Urine, Clean Catch    Other orders  -     amoxicillin (AMOXIL) 500 MG capsule; Take 1 capsule by mouth 2 (Two) Times a Day.      Patient  w/ acute UTI. She will start amoxil 500 mg bid and take for one week. She is to hydrate well and start cranberry tabs. She will lubricate vaginal region and may consider an estrogen cream topically in the future if needed. If any further uti to urology or uro gyn. She will f/u prn.

## 2021-09-23 LAB
BACTERIA UR CULT: ABNORMAL
BACTERIA UR CULT: ABNORMAL
OTHER ANTIBIOTIC SUSC ISLT: ABNORMAL

## 2021-09-24 ENCOUNTER — TELEPHONE (OUTPATIENT)
Dept: INTERNAL MEDICINE | Facility: CLINIC | Age: 56
End: 2021-09-24

## 2021-09-24 NOTE — TELEPHONE ENCOUNTER
Caller: Yi Hurtado    Relationship: Self    Best call back number: [phone]    Caller requesting test results: PATIENT     What test was performed: URINE TEST    When was the test performed: 9/20    Where was the test performed: IN OFFICE     Additional notes: PLEASE CALL WITH RESULTS

## 2021-10-12 DIAGNOSIS — Z00.00 HEALTH MAINTENANCE EXAMINATION: Primary | ICD-10-CM

## 2021-10-12 DIAGNOSIS — E55.9 VITAMIN D DEFICIENCY: ICD-10-CM

## 2021-10-15 LAB
25(OH)D3+25(OH)D2 SERPL-MCNC: 35.3 NG/ML (ref 30–100)
ALBUMIN SERPL-MCNC: 4.4 G/DL (ref 3.5–5.2)
ALBUMIN/GLOB SERPL: 2.1 G/DL
ALP SERPL-CCNC: 82 U/L (ref 39–117)
ALT SERPL-CCNC: 12 U/L (ref 1–33)
APPEARANCE UR: CLEAR
AST SERPL-CCNC: 15 U/L (ref 1–32)
BACTERIA #/AREA URNS HPF: NORMAL /HPF
BASOPHILS # BLD AUTO: 0.12 10*3/MM3 (ref 0–0.2)
BASOPHILS NFR BLD AUTO: 1.6 % (ref 0–1.5)
BILIRUB SERPL-MCNC: 0.4 MG/DL (ref 0–1.2)
BILIRUB UR QL STRIP: NEGATIVE
BUN SERPL-MCNC: 16 MG/DL (ref 6–20)
BUN/CREAT SERPL: 27.1 (ref 7–25)
CALCIUM SERPL-MCNC: 9.4 MG/DL (ref 8.6–10.5)
CASTS URNS MICRO: NORMAL
CHLORIDE SERPL-SCNC: 106 MMOL/L (ref 98–107)
CHOLEST SERPL-MCNC: 181 MG/DL (ref 0–200)
CO2 SERPL-SCNC: 28.7 MMOL/L (ref 22–29)
COLOR UR: YELLOW
CREAT SERPL-MCNC: 0.59 MG/DL (ref 0.57–1)
EOSINOPHIL # BLD AUTO: 0.18 10*3/MM3 (ref 0–0.4)
EOSINOPHIL NFR BLD AUTO: 2.5 % (ref 0.3–6.2)
EPI CELLS #/AREA URNS HPF: NORMAL /HPF
ERYTHROCYTE [DISTWIDTH] IN BLOOD BY AUTOMATED COUNT: 12.5 % (ref 12.3–15.4)
GLOBULIN SER CALC-MCNC: 2.1 GM/DL
GLUCOSE SERPL-MCNC: 90 MG/DL (ref 65–99)
GLUCOSE UR QL: NEGATIVE
HCT VFR BLD AUTO: 39.2 % (ref 34–46.6)
HDLC SERPL-MCNC: 82 MG/DL (ref 40–60)
HGB BLD-MCNC: 12.9 G/DL (ref 12–15.9)
HGB UR QL STRIP: NEGATIVE
IMM GRANULOCYTES # BLD AUTO: 0.01 10*3/MM3 (ref 0–0.05)
IMM GRANULOCYTES NFR BLD AUTO: 0.1 % (ref 0–0.5)
KETONES UR QL STRIP: NEGATIVE
LDLC SERPL CALC-MCNC: 89 MG/DL (ref 0–100)
LEUKOCYTE ESTERASE UR QL STRIP: NEGATIVE
LYMPHOCYTES # BLD AUTO: 2.15 10*3/MM3 (ref 0.7–3.1)
LYMPHOCYTES NFR BLD AUTO: 29.5 % (ref 19.6–45.3)
MCH RBC QN AUTO: 29.5 PG (ref 26.6–33)
MCHC RBC AUTO-ENTMCNC: 32.9 G/DL (ref 31.5–35.7)
MCV RBC AUTO: 89.5 FL (ref 79–97)
MONOCYTES # BLD AUTO: 0.63 10*3/MM3 (ref 0.1–0.9)
MONOCYTES NFR BLD AUTO: 8.6 % (ref 5–12)
NEUTROPHILS # BLD AUTO: 4.21 10*3/MM3 (ref 1.7–7)
NEUTROPHILS NFR BLD AUTO: 57.7 % (ref 42.7–76)
NITRITE UR QL STRIP: NEGATIVE
NRBC BLD AUTO-RTO: 0 /100 WBC (ref 0–0.2)
PH UR STRIP: 7 [PH] (ref 5–8)
PLATELET # BLD AUTO: 312 10*3/MM3 (ref 140–450)
POTASSIUM SERPL-SCNC: 4.3 MMOL/L (ref 3.5–5.2)
PROT SERPL-MCNC: 6.5 G/DL (ref 6–8.5)
PROT UR QL STRIP: NEGATIVE
RBC # BLD AUTO: 4.38 10*6/MM3 (ref 3.77–5.28)
RBC #/AREA URNS HPF: NORMAL /HPF
SODIUM SERPL-SCNC: 144 MMOL/L (ref 136–145)
SP GR UR: 1.01 (ref 1–1.03)
TRIGL SERPL-MCNC: 48 MG/DL (ref 0–150)
TSH SERPL DL<=0.005 MIU/L-ACNC: 1.09 UIU/ML (ref 0.27–4.2)
UROBILINOGEN UR STRIP-MCNC: NORMAL MG/DL
VLDLC SERPL CALC-MCNC: 10 MG/DL (ref 5–40)
WBC # BLD AUTO: 7.3 10*3/MM3 (ref 3.4–10.8)
WBC #/AREA URNS HPF: NORMAL /HPF

## 2021-10-22 ENCOUNTER — OFFICE VISIT (OUTPATIENT)
Dept: INTERNAL MEDICINE | Facility: CLINIC | Age: 56
End: 2021-10-22

## 2021-10-22 VITALS
WEIGHT: 182 LBS | OXYGEN SATURATION: 99 % | SYSTOLIC BLOOD PRESSURE: 128 MMHG | DIASTOLIC BLOOD PRESSURE: 84 MMHG | HEART RATE: 58 BPM | HEIGHT: 69 IN | BODY MASS INDEX: 26.96 KG/M2

## 2021-10-22 DIAGNOSIS — Z00.00 WELL ADULT EXAM: Primary | ICD-10-CM

## 2021-10-22 PROCEDURE — 99396 PREV VISIT EST AGE 40-64: CPT | Performed by: INTERNAL MEDICINE

## 2021-10-22 NOTE — PROGRESS NOTES
Subjective     Yi Hurtado is a 56 y.o. female who presents for a complete physical exam.      History of Present Illness     The following data was reviewed by: Thao Vega MD on 10/22/2021:  Common labs    Common Labsle 10/14/21 10/14/21 10/14/21    0853 0853 0853   Glucose 90     BUN 16     Creatinine 0.59     eGFR Non  Am 105     eGFR African Am 127     Sodium 144     Potassium 4.3     Chloride 106     Calcium 9.4     Total Protein 6.5     Albumin 4.40     Total Bilirubin 0.4     Alkaline Phosphatase 82     AST (SGOT) 15     ALT (SGPT) 12     WBC  7.30    Hemoglobin  12.9    Hematocrit  39.2    Platelets  312    Total Cholesterol   181   Triglycerides   48   HDL Cholesterol   82 (A)   LDL Cholesterol    89   (A) Abnormal value       Comments are available for some flowsheets but are not being displayed.           Labs look good.      She still smokes 1-2 cigarettes daily.  Discussed cessation.        Review of Systems   Constitutional: Negative.    HENT: Negative.    Eyes: Negative.    Respiratory: Negative.    Cardiovascular: Negative.    Gastrointestinal: Negative.    Endocrine: Negative.    Genitourinary: Negative.    Musculoskeletal: Negative.    Skin: Negative.    Allergic/Immunologic: Negative.    Neurological: Negative.    Hematological: Negative.    Psychiatric/Behavioral: Negative.        The following portions of the patient's history were reviewed and updated as appropriate: allergies, current medications, past family history, past medical history, past social history, past surgical history and problem list.  Health maintenance tab was reviewed and updated with the patient.       Patient Active Problem List    Diagnosis Date Noted   • Vitamin D deficiency 03/27/2018   • Mild intermittent asthma without complication 02/06/2017   • Light tobacco smoker <10 cigarettes per day 02/06/2017   • Allergic rhinitis 01/29/2016   • Dysphagia 06/10/2013     Note Last Updated: 3/19/2018     Dr. Dueñas  Zoey         Past Medical History:   Diagnosis Date   • Allergic     sulpha, cipro   • Asthma    • Blood tests for routine general physical examination    • Dysphagia 06/10/2013    Dr. Leana Greer   • Earache    • Eustachian tube dysfunction    • Injury of back     lumbar bulging discs, unsure level   • Irritable bowel syndrome    • Myelopathy (HCC) 1984    Dr. Ross Fernandez and Dr. Moises Orozco   • Seizures ([facility])    • Simple goiter    • Spinal stenosis    • Urinary tract infection        Past Surgical History:   Procedure Laterality Date   • APPENDECTOMY N/A 1969   • CATARACT EXTRACTION, BILATERAL Bilateral 3/2017, 4/2017   • ENDOSCOPY AND COLONOSCOPY N/A 06/10/2013    Mild suggestion of a ringed esophagus, biopsied to r/o eosinophilic esophagitis-Dr. Leana Greer   • MAMMO FINE NEEDLE ASPIRATION UNILATERAL Right 2013    pathology negative   • PILONIDAL CYSTECTOMY N/A    • VAGINAL HYSTERECTOMY SALPINGO OOPHORECTOMY WITH ANTERIOR AND POSTERIOR VAGINAL REPAIR N/A 04/27/2011    Total vaginal hysterectomy, external Andrews's culdoplasty and fulguration of implant of culk-de-sac endometriosis-Dr. Gildardo Howard       Family History   Problem Relation Age of Onset   • Dementia Mother    • Hypothyroidism Mother    • Hypertension Mother    • Diabetes type II Mother    • Stroke Mother    • Thyroid cancer Mother    • Alcohol abuse Father    • Other Father         acute heart attack   • Heart disease Father    • ADD / ADHD Son        Social History     Socioeconomic History   • Marital status:      Spouse name: Angel Luis   • Number of children: 2   Tobacco Use   • Smoking status: Current Every Day Smoker     Packs/day: 0.10     Years: 39.00     Pack years: 3.90     Types: Cigarettes     Start date: 1/6/1979   • Smokeless tobacco: Never Used   Vaping Use   • Vaping Use: Never used   Substance and Sexual Activity   • Alcohol use: Yes     Alcohol/week: 1.0 standard drink     Types: 1 Standard drinks or equivalent per  week   • Sexual activity: Defer       Current Outpatient Medications on File Prior to Visit   Medication Sig Dispense Refill   • albuterol sulfate  (90 Base) MCG/ACT inhaler Inhale 2 puffs Every 4 (Four) Hours As Needed for Wheezing or Shortness of Air. 1 inhaler 3   • ALLERGY SERUM INJECTION 2 injections per week     • amoxicillin (AMOXIL) 500 MG capsule Take 1 capsule by mouth 2 (Two) Times a Day. 14 capsule 0   • ciclopirox (PENLAC) 8 % solution APPLY TOPICALLY TO THE APPROPRIATE AREA AS DIRECTED EVERY NIGHT. 6.6 mL 1   • fexofenadine (ALLEGRA) 180 MG tablet Take 180 mg by mouth 2 (Two) Times a Day.     • Flaxseed, Linseed, (FLAX SEEDS PO) Take 1 tablet by mouth Daily.     • Multiple Vitamin (MULTI VITAMIN PO) Take 1 tablet by mouth Daily.     • Omega-3 Fatty Acids (FISH OIL PO) Take 1 tablet by mouth daily.     • Triamcinolone Acetonide (NASACORT AQ NA) 1 spray into each nostril Daily As Needed.       No current facility-administered medications on file prior to visit.       Allergies   Allergen Reactions   • Ciprofloxacin Swelling   • Sulfa Antibiotics Mental Status Change   • Macrobid [Nitrofurantoin Macrocrystal] Dizziness       Immunization History   Administered Date(s) Administered   • COVID-19 (PFIZER) 02/22/2021, 03/15/2021   • Pneumococcal Polysaccharide (PPSV23) 07/22/2020   • TD Preservative Free 08/04/2020   • Tdap 05/01/2008       Objective     There were no vitals taken for this visit.    Physical Exam  Constitutional:       Appearance: She is well-developed.   HENT:      Head: Normocephalic and atraumatic.      Right Ear: Hearing, tympanic membrane and external ear normal.      Left Ear: Hearing, tympanic membrane and external ear normal.      Nose: Nose normal.   Neck:      Thyroid: No thyromegaly.   Cardiovascular:      Rate and Rhythm: Normal rate and regular rhythm.      Heart sounds: Normal heart sounds. No murmur heard.      Pulmonary:      Effort: Pulmonary effort is normal.       Breath sounds: Normal breath sounds.   Abdominal:      General: There is no distension.      Palpations: Abdomen is soft.      Tenderness: There is no abdominal tenderness.   Musculoskeletal:      Cervical back: Neck supple.   Lymphadenopathy:      Cervical: No cervical adenopathy.   Skin:     General: Skin is warm and dry.   Neurological:      Mental Status: She is alert and oriented to person, place, and time.   Psychiatric:         Speech: Speech normal.         Behavior: Behavior normal.         Thought Content: Thought content normal.         Judgment: Judgment normal.         Assessment/Plan   Diagnoses and all orders for this visit:    1. Well adult exam (Primary)        Discussion    Patient presents today for a CPE.      Patient follows a healthy diet.   Patient follows an adequate exercise regimen. Patient will schedule a mammogram. Colon cancer screening is up to date.   Pap smears are performed by the patient's gynecologist.  I have recommended that the patient get the following immunizations:  COVID-19.      Health Maintenance   Topic Date Due   • ZOSTER VACCINE (1 of 2) Never done   • PAP SMEAR  12/01/2020   • ANNUAL PHYSICAL  07/23/2021   • COVID-19 Vaccine (3 - Pfizer booster) 09/15/2021   • MAMMOGRAM  08/13/2022   • COLORECTAL CANCER SCREENING  06/10/2023   • DXA SCAN  04/25/2024   • Pneumococcal Vaccine 0-64 (2 of 2 - PPSV23) 01/06/2028   • TDAP/TD VACCINES (3 - Td or Tdap) 08/04/2030   • HEPATITIS C SCREENING  Completed   • INFLUENZA VACCINE  Discontinued            No future appointments.

## 2021-11-13 ENCOUNTER — IMMUNIZATION (OUTPATIENT)
Dept: VACCINE CLINIC | Facility: HOSPITAL | Age: 56
End: 2021-11-13

## 2021-11-13 PROCEDURE — 91300 HC SARSCOV02 VAC 30MCG/0.3ML IM: CPT | Performed by: INTERNAL MEDICINE

## 2021-11-13 PROCEDURE — 0004A ADM SARSCOV2 30MCG/0.3ML BOOSTER: CPT | Performed by: INTERNAL MEDICINE

## 2021-12-20 ENCOUNTER — OFFICE VISIT (OUTPATIENT)
Dept: INTERNAL MEDICINE | Facility: CLINIC | Age: 56
End: 2021-12-20

## 2021-12-20 VITALS
BODY MASS INDEX: 26.96 KG/M2 | DIASTOLIC BLOOD PRESSURE: 90 MMHG | SYSTOLIC BLOOD PRESSURE: 140 MMHG | OXYGEN SATURATION: 98 % | HEART RATE: 62 BPM | WEIGHT: 182 LBS | HEIGHT: 69 IN

## 2021-12-20 DIAGNOSIS — N30.00 ACUTE CYSTITIS WITHOUT HEMATURIA: Primary | ICD-10-CM

## 2021-12-20 LAB
BILIRUB BLD-MCNC: NEGATIVE MG/DL
CLARITY, POC: CLEAR
COLOR UR: YELLOW
EXPIRATION DATE: ABNORMAL
GLUCOSE UR STRIP-MCNC: NEGATIVE MG/DL
KETONES UR QL: NEGATIVE
LEUKOCYTE EST, POC: ABNORMAL
Lab: ABNORMAL
NITRITE UR-MCNC: POSITIVE MG/ML
PH UR: 5 [PH] (ref 5–8)
PROT UR STRIP-MCNC: NEGATIVE MG/DL
RBC # UR STRIP: ABNORMAL /UL
SP GR UR: 1 (ref 1–1.03)
UROBILINOGEN UR QL: NORMAL

## 2021-12-20 PROCEDURE — 99213 OFFICE O/P EST LOW 20 MIN: CPT | Performed by: INTERNAL MEDICINE

## 2021-12-20 PROCEDURE — 81003 URINALYSIS AUTO W/O SCOPE: CPT | Performed by: INTERNAL MEDICINE

## 2021-12-20 RX ORDER — AMOXICILLIN AND CLAVULANATE POTASSIUM 875; 125 MG/1; MG/1
1 TABLET, FILM COATED ORAL 2 TIMES DAILY
Qty: 14 TABLET | Refills: 0 | Status: SHIPPED | OUTPATIENT
Start: 2021-12-20 | End: 2021-12-27

## 2021-12-20 RX ORDER — CONJUGATED ESTROGENS 0.62 MG/G
CREAM VAGINAL
Qty: 30 G | Refills: 1 | Status: SHIPPED | OUTPATIENT
Start: 2021-12-20

## 2021-12-20 NOTE — PROGRESS NOTES
"Subjective     Yi Hurtado is a 56 y.o. female who presents with   Chief Complaint   Patient presents with   • Urinary Frequency       History of Present Illness     C/o urinary frequency and burning.  Going on for two days.  No back pain.  Lower abdominal discomfort.      Review of Systems   Constitutional: Negative for fever.       The following portions of the patient's history were reviewed and updated as appropriate: allergies, current medications and problem list.    Patient Active Problem List    Diagnosis Date Noted   • Vitamin D deficiency 03/27/2018   • Mild intermittent asthma without complication 02/06/2017   • Light tobacco smoker <10 cigarettes per day 02/06/2017   • Allergic rhinitis 01/29/2016       Current Outpatient Medications on File Prior to Visit   Medication Sig Dispense Refill   • albuterol sulfate  (90 Base) MCG/ACT inhaler Inhale 2 puffs Every 4 (Four) Hours As Needed for Wheezing or Shortness of Air. 1 inhaler 3   • ALLERGY SERUM INJECTION 2 injections per week     • ciclopirox (PENLAC) 8 % solution APPLY TOPICALLY TO THE APPROPRIATE AREA AS DIRECTED EVERY NIGHT. 6.6 mL 1   • fexofenadine (ALLEGRA) 180 MG tablet Take 180 mg by mouth 2 (Two) Times a Day.     • Flaxseed, Linseed, (FLAX SEEDS PO) Take 1 tablet by mouth Daily.     • Multiple Vitamin (MULTI VITAMIN PO) Take 1 tablet by mouth Daily.     • Omega-3 Fatty Acids (FISH OIL PO) Take 1 tablet by mouth daily.     • Triamcinolone Acetonide (NASACORT AQ NA) 1 spray into each nostril Daily As Needed.       No current facility-administered medications on file prior to visit.       Objective     /90   Pulse 62   Ht 174 cm (68.5\")   Wt 82.6 kg (182 lb)   SpO2 98%   BMI 27.27 kg/m²     Physical Exam  Constitutional:       Appearance: She is well-developed.   HENT:      Head: Normocephalic and atraumatic.   Pulmonary:      Effort: Pulmonary effort is normal.   Abdominal:      General: There is no distension.      Palpations: " Abdomen is soft. There is no mass.      Tenderness: There is abdominal tenderness in the suprapubic area. There is no guarding or rebound.   Neurological:      Mental Status: She is alert and oriented to person, place, and time.   Psychiatric:         Behavior: Behavior normal.         Assessment/Plan   Diagnoses and all orders for this visit:    1. Acute cystitis without hematuria (Primary)  -     Urine Culture - Urine, Urine, Clean Catch  -     POC Urinalysis Dipstick, Automated    Other orders  -     amoxicillin-clavulanate (AUGMENTIN) 875-125 MG per tablet; Take 1 tablet by mouth 2 (Two) Times a Day for 7 days.  Dispense: 14 tablet; Refill: 0  -     conjugated estrogens (Premarin) 0.625 MG/GM vaginal cream; Apply pea sized amount three three weekly.  Dispense: 30 g; Refill: 1        Discussion    Patient presents with symptoms of acute cystitis.  Urine dip is positive for markers of infection.  We will send for culture and follow up on that.  Prescription for antibiotics is sent in.  The patient is instructed to let our office know if not feeling better over the next several days or if there is any change in symptoms.  She is going to try Cystex cranberry and premarin vaginal cream for UTI prevention.           Future Appointments   Date Time Provider Department Center   10/21/2022  8:10 AM [facility]   10/28/2022  3:30 PM Thao Vega MD [facility]

## 2021-12-24 LAB
BACTERIA UR CULT: ABNORMAL
BACTERIA UR CULT: ABNORMAL
OTHER ANTIBIOTIC SUSC ISLT: ABNORMAL

## 2022-01-04 ENCOUNTER — OFFICE VISIT (OUTPATIENT)
Dept: INTERNAL MEDICINE | Facility: CLINIC | Age: 57
End: 2022-01-04

## 2022-01-04 VITALS
HEART RATE: 74 BPM | HEIGHT: 69 IN | DIASTOLIC BLOOD PRESSURE: 78 MMHG | OXYGEN SATURATION: 98 % | WEIGHT: 182 LBS | SYSTOLIC BLOOD PRESSURE: 112 MMHG | BODY MASS INDEX: 26.96 KG/M2

## 2022-01-04 DIAGNOSIS — R35.0 FREQUENT URINATION: ICD-10-CM

## 2022-01-04 DIAGNOSIS — N30.00 ACUTE CYSTITIS WITHOUT HEMATURIA: Primary | ICD-10-CM

## 2022-01-04 LAB
BILIRUB BLD-MCNC: NEGATIVE MG/DL
CLARITY, POC: CLEAR
COLOR UR: YELLOW
EXPIRATION DATE: ABNORMAL
GLUCOSE UR STRIP-MCNC: NEGATIVE MG/DL
KETONES UR QL: NEGATIVE
LEUKOCYTE EST, POC: ABNORMAL
Lab: ABNORMAL
NITRITE UR-MCNC: NEGATIVE MG/ML
PH UR: 5 [PH] (ref 5–8)
PROT UR STRIP-MCNC: NEGATIVE MG/DL
RBC # UR STRIP: ABNORMAL /UL
SP GR UR: 1.01 (ref 1–1.03)
UROBILINOGEN UR QL: NORMAL

## 2022-01-04 PROCEDURE — 81003 URINALYSIS AUTO W/O SCOPE: CPT | Performed by: INTERNAL MEDICINE

## 2022-01-04 PROCEDURE — 99213 OFFICE O/P EST LOW 20 MIN: CPT | Performed by: INTERNAL MEDICINE

## 2022-01-04 RX ORDER — NITROFURANTOIN 25; 75 MG/1; MG/1
100 CAPSULE ORAL 2 TIMES DAILY
Qty: 14 CAPSULE | Refills: 0 | Status: SHIPPED | OUTPATIENT
Start: 2022-01-04

## 2022-01-04 NOTE — PROGRESS NOTES
"Subjective     Yi Hurtado is a 56 y.o. female who presents with   Chief Complaint   Patient presents with   • Urinary Frequency       History of Present Illness     She improved with antibiotics but started to have symptoms two days ago.  Urinary frequency.  No burning is associated.  LLQ as well.  No fever.  No flank pain.  Strong odor is associated.        Review of Systems   Constitutional: Negative for fever.   Musculoskeletal: Negative for back pain.       The following portions of the patient's history were reviewed and updated as appropriate: allergies, current medications and problem list.    Patient Active Problem List    Diagnosis Date Noted   • Vitamin D deficiency 03/27/2018   • Mild intermittent asthma without complication 02/06/2017   • Light tobacco smoker <10 cigarettes per day 02/06/2017   • Allergic rhinitis 01/29/2016       Current Outpatient Medications on File Prior to Visit   Medication Sig Dispense Refill   • albuterol sulfate  (90 Base) MCG/ACT inhaler Inhale 2 puffs Every 4 (Four) Hours As Needed for Wheezing or Shortness of Air. 1 inhaler 3   • ALLERGY SERUM INJECTION 2 injections per week     • ciclopirox (PENLAC) 8 % solution APPLY TOPICALLY TO THE APPROPRIATE AREA AS DIRECTED EVERY NIGHT. 6.6 mL 1   • conjugated estrogens (Premarin) 0.625 MG/GM vaginal cream Apply pea sized amount three three weekly. 30 g 1   • fexofenadine (ALLEGRA) 180 MG tablet Take 180 mg by mouth 2 (Two) Times a Day.     • Flaxseed, Linseed, (FLAX SEEDS PO) Take 1 tablet by mouth Daily.     • Multiple Vitamin (MULTI VITAMIN PO) Take 1 tablet by mouth Daily.     • Omega-3 Fatty Acids (FISH OIL PO) Take 1 tablet by mouth daily.     • Triamcinolone Acetonide (NASACORT AQ NA) 1 spray into each nostril Daily As Needed.       No current facility-administered medications on file prior to visit.       Objective     /78   Pulse 74   Ht 174 cm (68.5\")   Wt 82.6 kg (182 lb)   SpO2 98%   BMI 27.27 kg/m² "     Physical Exam  Constitutional:       Appearance: She is well-developed.   HENT:      Head: Normocephalic and atraumatic.   Pulmonary:      Effort: Pulmonary effort is normal.   Abdominal:      General: There is no distension.      Palpations: Abdomen is soft. There is no mass.      Tenderness: There is no abdominal tenderness. There is no guarding or rebound.   Neurological:      Mental Status: She is alert.         Assessment/Plan   Diagnoses and all orders for this visit:    1. Acute cystitis without hematuria (Primary)    2. Frequent urination  -     POC Urinalysis Dipstick, Automated  -     Urine Culture - Urine, Urine, Clean Catch    Other orders  -     nitrofurantoin, macrocrystal-monohydrate, (Macrobid) 100 MG capsule; Take 1 capsule by mouth 2 (Two) Times a Day.  Dispense: 14 capsule; Refill: 0        Discussion    Patient presents with symptoms of acute cystitis.  Urine dip is positive for markers of infection.  We will send for culture and follow up on that.  Prescription for antibiotics is sent in.  The patient is instructed to let our office know if not feeling better over the next several days or if there is any change in symptoms. She is encouraged to drink plenty of water, use premarin cream three days a week and take Cystex cranberry.           Future Appointments   Date Time Provider Department Center   10/21/2022  8:10 AM [facility]   10/28/2022  3:30 PM Thao Vega MD [facility]

## 2022-01-08 LAB
BACTERIA UR CULT: ABNORMAL
BACTERIA UR CULT: ABNORMAL
OTHER ANTIBIOTIC SUSC ISLT: ABNORMAL

## 2022-02-08 ENCOUNTER — TELEPHONE (OUTPATIENT)
Dept: INTERNAL MEDICINE | Facility: CLINIC | Age: 57
End: 2022-02-08

## 2022-02-08 NOTE — TELEPHONE ENCOUNTER
Caller: Yi Hurtado    Relationship: Self    Best call back number: [phone]    What is the best time to reach you: ANYTIME    Who are you requesting to speak with (clinical staff, provider,  specific staff member): CLINICAL STAFF    What was the call regarding: PATIENT IS CALLING IN STATING THAT SHE HAS HAD SEVERAL OF THESE URINARY TRACK INFECTION'S AND IS WONDERING IF DR BROWNING WANTS TO SEE HER FOR HER APPOINTMENT ON Wednesday 02/09/22 OR IF SHE NEEDS TO SEE A SPECIALIST.     PLEASE CALL PATIENT TO ADVISE.

## 2022-02-09 ENCOUNTER — OFFICE VISIT (OUTPATIENT)
Dept: INTERNAL MEDICINE | Facility: CLINIC | Age: 57
End: 2022-02-09

## 2022-02-09 VITALS
DIASTOLIC BLOOD PRESSURE: 80 MMHG | OXYGEN SATURATION: 98 % | WEIGHT: 182 LBS | SYSTOLIC BLOOD PRESSURE: 116 MMHG | HEART RATE: 85 BPM | HEIGHT: 69 IN | BODY MASS INDEX: 26.96 KG/M2

## 2022-02-09 DIAGNOSIS — N30.00 ACUTE CYSTITIS WITHOUT HEMATURIA: Primary | ICD-10-CM

## 2022-02-09 DIAGNOSIS — N39.0 RECURRENT UTI: ICD-10-CM

## 2022-02-09 LAB
BILIRUB BLD-MCNC: NEGATIVE MG/DL
CLARITY, POC: CLEAR
COLOR UR: YELLOW
EXPIRATION DATE: ABNORMAL
GLUCOSE UR STRIP-MCNC: NEGATIVE MG/DL
KETONES UR QL: NEGATIVE
LEUKOCYTE EST, POC: ABNORMAL
Lab: ABNORMAL
NITRITE UR-MCNC: POSITIVE MG/ML
PH UR: 7 [PH] (ref 5–8)
PROT UR STRIP-MCNC: NEGATIVE MG/DL
RBC # UR STRIP: ABNORMAL /UL
SP GR UR: 1.01 (ref 1–1.03)
UROBILINOGEN UR QL: NORMAL

## 2022-02-09 PROCEDURE — 99213 OFFICE O/P EST LOW 20 MIN: CPT | Performed by: INTERNAL MEDICINE

## 2022-02-09 PROCEDURE — 81003 URINALYSIS AUTO W/O SCOPE: CPT | Performed by: INTERNAL MEDICINE

## 2022-02-09 RX ORDER — CEFDINIR 300 MG/1
300 CAPSULE ORAL 2 TIMES DAILY
Qty: 14 CAPSULE | Refills: 0 | Status: SHIPPED | OUTPATIENT
Start: 2022-02-09 | End: 2022-10-28

## 2022-02-09 NOTE — PROGRESS NOTES
"Subjective     Yi Hurtado is a 57 y.o. female who presents with No chief complaint on file.      History of Present Illness     Recovered from last UTI.  Symptoms again for the couple weeks but really back yesterday.  Foul smell.  Dysuria.  Frequency.  Urgency.  Fifth E. Coli UTI since August all E.coli.      Review of Systems   Respiratory: Negative.    Cardiovascular: Negative.        The following portions of the patient's history were reviewed and updated as appropriate: allergies, current medications and problem list.    Patient Active Problem List    Diagnosis Date Noted   • Vitamin D deficiency 03/27/2018   • Mild intermittent asthma without complication 02/06/2017   • Light tobacco smoker <10 cigarettes per day 02/06/2017   • Allergic rhinitis 01/29/2016       Current Outpatient Medications on File Prior to Visit   Medication Sig Dispense Refill   • albuterol sulfate  (90 Base) MCG/ACT inhaler Inhale 2 puffs Every 4 (Four) Hours As Needed for Wheezing or Shortness of Air. 1 inhaler 3   • ALLERGY SERUM INJECTION 2 injections per week     • ciclopirox (PENLAC) 8 % solution APPLY TOPICALLY TO THE APPROPRIATE AREA AS DIRECTED EVERY NIGHT. 6.6 mL 1   • conjugated estrogens (Premarin) 0.625 MG/GM vaginal cream Apply pea sized amount three three weekly. 30 g 1   • fexofenadine (ALLEGRA) 180 MG tablet Take 180 mg by mouth 2 (Two) Times a Day.     • Flaxseed, Linseed, (FLAX SEEDS PO) Take 1 tablet by mouth Daily.     • Multiple Vitamin (MULTI VITAMIN PO) Take 1 tablet by mouth Daily.     • nitrofurantoin, macrocrystal-monohydrate, (Macrobid) 100 MG capsule Take 1 capsule by mouth 2 (Two) Times a Day. 14 capsule 0   • Omega-3 Fatty Acids (FISH OIL PO) Take 1 tablet by mouth daily.     • Triamcinolone Acetonide (NASACORT AQ NA) 1 spray into each nostril Daily As Needed.       No current facility-administered medications on file prior to visit.       Objective     /80   Pulse 85   Ht 174 cm (68.5\")   " Wt 82.6 kg (182 lb)   SpO2 98%   BMI 27.27 kg/m²     Physical Exam  Constitutional:       Appearance: She is well-developed.   HENT:      Head: Normocephalic and atraumatic.   Pulmonary:      Effort: Pulmonary effort is normal.   Neurological:      Mental Status: She is alert and oriented to person, place, and time.   Psychiatric:         Behavior: Behavior normal.         Assessment/Plan   Diagnoses and all orders for this visit:    1. Acute cystitis without hematuria (Primary)  -     Urine Culture - Urine, Urine, Clean Catch  -     POC Urinalysis Dipstick, Automated  -     Ambulatory Referral to Urology    2. Recurrent UTI  -     Ambulatory Referral to Urology    Other orders  -     cefdinir (OMNICEF) 300 MG capsule; Take 1 capsule by mouth 2 (Two) Times a Day.  Dispense: 14 capsule; Refill: 0        Discussion    Patient presents with symptoms of acute cystitis.  Urine dip is positive for markers of infection.  We will send for culture and follow up on that.  Prescription for antibiotics is sent in.  The patient is instructed to let our office know if not feeling better over the next several days or if there is any change in symptoms.  Continue Cystex.  I encouraged Premarin Vaginal Cream.  Refer to urology for recurrent UTIs.         Future Appointments   Date Time Provider Department Center   10/21/2022  8:10 AM [facility]   10/28/2022  3:30 PM Thao Vega MD [facility]

## 2022-02-10 ENCOUNTER — TELEPHONE (OUTPATIENT)
Dept: INTERNAL MEDICINE | Facility: CLINIC | Age: 57
End: 2022-02-10

## 2022-02-10 NOTE — TELEPHONE ENCOUNTER
Caller: Yi Hurtado    Relationship: Self    Best call back number: [phone]    What is the best time to reach you: ANYTIME     Who are you requesting to speak with (clinical staff, provider,  specific staff member): DR. BROWNING OR ASSISTANT      Do you know the name of the person who called:     What was the call regarding: SHE WOULD LIKE TO DISCUSS A REFERRAL     Do you require a callback: YES

## 2022-02-13 LAB
BACTERIA UR CULT: ABNORMAL
BACTERIA UR CULT: ABNORMAL
OTHER ANTIBIOTIC SUSC ISLT: ABNORMAL

## 2022-03-15 ENCOUNTER — OFFICE VISIT (OUTPATIENT)
Dept: INTERNAL MEDICINE | Facility: CLINIC | Age: 57
End: 2022-03-15

## 2022-03-15 VITALS
DIASTOLIC BLOOD PRESSURE: 90 MMHG | BODY MASS INDEX: 26.81 KG/M2 | HEIGHT: 69 IN | HEART RATE: 68 BPM | WEIGHT: 181 LBS | SYSTOLIC BLOOD PRESSURE: 132 MMHG | OXYGEN SATURATION: 98 %

## 2022-03-15 DIAGNOSIS — F17.210 LIGHT TOBACCO SMOKER <10 CIGARETTES PER DAY: ICD-10-CM

## 2022-03-15 DIAGNOSIS — IMO0001 LUNG NODULE < 6CM ON CT: Primary | ICD-10-CM

## 2022-03-15 PROCEDURE — 99213 OFFICE O/P EST LOW 20 MIN: CPT | Performed by: INTERNAL MEDICINE

## 2022-03-15 NOTE — PROGRESS NOTES
Subjective     Yi Hurtado is a 57 y.o. female who presents with   Chief Complaint   Patient presents with   • abnormal ct chest       History of Present Illness     Pulmonary nodule seen on Ct of the chest.  Patient is a lifelong light cigarette smoker.  No cough.  No SOA.       Review of Systems   Respiratory: Negative.    Cardiovascular: Negative.        The following portions of the patient's history were reviewed and updated as appropriate: allergies, current medications and problem list.    Patient Active Problem List    Diagnosis Date Noted   • Vitamin D deficiency 03/27/2018   • Mild intermittent asthma without complication 02/06/2017   • Light tobacco smoker <10 cigarettes per day 02/06/2017   • Allergic rhinitis 01/29/2016       Current Outpatient Medications on File Prior to Visit   Medication Sig Dispense Refill   • albuterol sulfate  (90 Base) MCG/ACT inhaler Inhale 2 puffs Every 4 (Four) Hours As Needed for Wheezing or Shortness of Air. 1 inhaler 3   • ALLERGY SERUM INJECTION 2 injections per week     • cefdinir (OMNICEF) 300 MG capsule Take 1 capsule by mouth 2 (Two) Times a Day. 14 capsule 0   • ciclopirox (PENLAC) 8 % solution APPLY TOPICALLY TO THE APPROPRIATE AREA AS DIRECTED EVERY NIGHT. 6.6 mL 1   • conjugated estrogens (Premarin) 0.625 MG/GM vaginal cream Apply pea sized amount three three weekly. 30 g 1   • fexofenadine (ALLEGRA) 180 MG tablet Take 180 mg by mouth 2 (Two) Times a Day.     • Flaxseed, Linseed, (FLAX SEEDS PO) Take 1 tablet by mouth Daily.     • Multiple Vitamin (MULTI VITAMIN PO) Take 1 tablet by mouth Daily.     • nitrofurantoin, macrocrystal-monohydrate, (Macrobid) 100 MG capsule Take 1 capsule by mouth 2 (Two) Times a Day. 14 capsule 0   • Omega-3 Fatty Acids (FISH OIL PO) Take 1 tablet by mouth daily.     • Triamcinolone Acetonide (NASACORT AQ NA) 1 spray into each nostril Daily As Needed.       No current facility-administered medications on file prior to visit.  "      Objective     /90   Pulse 68   Ht 174 cm (68.5\")   Wt 82.1 kg (181 lb)   SpO2 98%   BMI 27.12 kg/m²     Physical Exam  Constitutional:       Appearance: She is well-developed.   HENT:      Head: Normocephalic and atraumatic.   Pulmonary:      Effort: Pulmonary effort is normal.   Neurological:      Mental Status: She is alert and oriented to person, place, and time.   Psychiatric:         Behavior: Behavior normal.         Assessment/Plan   Diagnoses and all orders for this visit:    1. Lung nodule < 6cm on CT (Primary)  -     CT Chest Without Contrast; Future        Discussion    Lung nodule seen on recent CT of the abdomen.  CT of the chest is ordered to further evaluate.         Future Appointments   Date Time Provider Department Center   10/21/2022  8:10 AM [facility]   10/28/2022  3:30 PM Thao Vega MD [facility]         "

## 2022-04-15 ENCOUNTER — APPOINTMENT (OUTPATIENT)
Dept: OTHER | Facility: HOSPITAL | Age: 57
End: 2022-04-15

## 2022-04-15 ENCOUNTER — HOSPITAL ENCOUNTER (OUTPATIENT)
Dept: CT IMAGING | Facility: HOSPITAL | Age: 57
Discharge: HOME | End: 2022-04-15

## 2022-04-15 DIAGNOSIS — IMO0001 LUNG NODULE < 6CM ON CT: ICD-10-CM

## 2022-04-15 DIAGNOSIS — Z09 FOLLOW-UP EXAM: ICD-10-CM

## 2022-04-15 PROCEDURE — 71250 CT THORAX DX C-: CPT

## 2022-10-21 DIAGNOSIS — Z00.00 HEALTHCARE MAINTENANCE: Primary | ICD-10-CM

## 2022-10-27 LAB
ALBUMIN SERPL-MCNC: 4.1 G/DL (ref 3.5–5.2)
ALBUMIN/GLOB SERPL: 2 G/DL
ALP SERPL-CCNC: 83 U/L (ref 39–117)
ALT SERPL-CCNC: 12 U/L (ref 1–33)
APPEARANCE UR: CLEAR
AST SERPL-CCNC: 20 U/L (ref 1–32)
BACTERIA #/AREA URNS HPF: NORMAL /HPF
BACTERIA UR CULT: ABNORMAL
BACTERIA UR CULT: ABNORMAL
BASOPHILS # BLD AUTO: 0.11 10*3/MM3 (ref 0–0.2)
BASOPHILS NFR BLD AUTO: 1.6 % (ref 0–1.5)
BILIRUB SERPL-MCNC: 0.4 MG/DL (ref 0–1.2)
BILIRUB UR QL STRIP: NEGATIVE
BUN SERPL-MCNC: 11 MG/DL (ref 6–20)
BUN/CREAT SERPL: 16.7 (ref 7–25)
CALCIUM SERPL-MCNC: 9.6 MG/DL (ref 8.6–10.5)
CASTS URNS QL MICRO: NORMAL /LPF
CHLORIDE SERPL-SCNC: 106 MMOL/L (ref 98–107)
CHOLEST SERPL-MCNC: 179 MG/DL (ref 0–200)
CO2 SERPL-SCNC: 30.1 MMOL/L (ref 22–29)
COLOR UR: YELLOW
CREAT SERPL-MCNC: 0.66 MG/DL (ref 0.57–1)
EGFRCR SERPLBLD CKD-EPI 2021: 101.2 ML/MIN/1.73
EOSINOPHIL # BLD AUTO: 0.23 10*3/MM3 (ref 0–0.4)
EOSINOPHIL NFR BLD AUTO: 3.4 % (ref 0.3–6.2)
EPI CELLS #/AREA URNS HPF: NORMAL /HPF (ref 0–10)
ERYTHROCYTE [DISTWIDTH] IN BLOOD BY AUTOMATED COUNT: 12.2 % (ref 12.3–15.4)
GLOBULIN SER CALC-MCNC: 2.1 GM/DL
GLUCOSE SERPL-MCNC: 87 MG/DL (ref 65–99)
GLUCOSE UR QL STRIP: NEGATIVE
HCT VFR BLD AUTO: 37.8 % (ref 34–46.6)
HDLC SERPL-MCNC: 76 MG/DL (ref 40–60)
HGB BLD-MCNC: 12.6 G/DL (ref 12–15.9)
HGB UR QL STRIP: NEGATIVE
IMM GRANULOCYTES # BLD AUTO: 0.02 10*3/MM3 (ref 0–0.05)
IMM GRANULOCYTES NFR BLD AUTO: 0.3 % (ref 0–0.5)
KETONES UR QL STRIP: NEGATIVE
LDLC SERPL CALC-MCNC: 90 MG/DL (ref 0–100)
LEUKOCYTE ESTERASE UR QL STRIP: ABNORMAL
LYMPHOCYTES # BLD AUTO: 2.1 10*3/MM3 (ref 0.7–3.1)
LYMPHOCYTES NFR BLD AUTO: 30.6 % (ref 19.6–45.3)
MCH RBC QN AUTO: 28.8 PG (ref 26.6–33)
MCHC RBC AUTO-ENTMCNC: 33.3 G/DL (ref 31.5–35.7)
MCV RBC AUTO: 86.5 FL (ref 79–97)
MICRO URNS: ABNORMAL
MONOCYTES # BLD AUTO: 0.65 10*3/MM3 (ref 0.1–0.9)
MONOCYTES NFR BLD AUTO: 9.5 % (ref 5–12)
NEUTROPHILS # BLD AUTO: 3.75 10*3/MM3 (ref 1.7–7)
NEUTROPHILS NFR BLD AUTO: 54.6 % (ref 42.7–76)
NITRITE UR QL STRIP: POSITIVE
NRBC BLD AUTO-RTO: 0 /100 WBC (ref 0–0.2)
OTHER ANTIBIOTIC SUSC ISLT: ABNORMAL
PH UR STRIP: 8 [PH] (ref 5–7.5)
PLATELET # BLD AUTO: 311 10*3/MM3 (ref 140–450)
POTASSIUM SERPL-SCNC: 5.3 MMOL/L (ref 3.5–5.2)
PROT SERPL-MCNC: 6.2 G/DL (ref 6–8.5)
PROT UR QL STRIP: NEGATIVE
RBC # BLD AUTO: 4.37 10*6/MM3 (ref 3.77–5.28)
RBC #/AREA URNS HPF: NORMAL /HPF (ref 0–2)
SODIUM SERPL-SCNC: 142 MMOL/L (ref 136–145)
SP GR UR STRIP: 1.01 (ref 1–1.03)
TRIGL SERPL-MCNC: 67 MG/DL (ref 0–150)
TSH SERPL DL<=0.005 MIU/L-ACNC: 0.87 UIU/ML (ref 0.27–4.2)
URINALYSIS REFLEX: ABNORMAL
UROBILINOGEN UR STRIP-MCNC: 0.2 MG/DL (ref 0.2–1)
VLDLC SERPL CALC-MCNC: 13 MG/DL (ref 5–40)
WBC # BLD AUTO: 6.86 10*3/MM3 (ref 3.4–10.8)
WBC #/AREA URNS HPF: NORMAL /HPF (ref 0–5)

## 2022-10-28 ENCOUNTER — OFFICE VISIT (OUTPATIENT)
Dept: INTERNAL MEDICINE | Facility: CLINIC | Age: 57
End: 2022-10-28

## 2022-10-28 VITALS
DIASTOLIC BLOOD PRESSURE: 88 MMHG | WEIGHT: 185 LBS | HEART RATE: 64 BPM | HEIGHT: 69 IN | OXYGEN SATURATION: 98 % | SYSTOLIC BLOOD PRESSURE: 124 MMHG | BODY MASS INDEX: 27.4 KG/M2

## 2022-10-28 DIAGNOSIS — Z00.00 WELL ADULT EXAM: Primary | ICD-10-CM

## 2022-10-28 DIAGNOSIS — Z78.0 MENOPAUSE: ICD-10-CM

## 2022-10-28 DIAGNOSIS — Z13.820 SCREENING FOR OSTEOPOROSIS: ICD-10-CM

## 2022-10-28 PROBLEM — R91.1 LUNG NODULE: Status: ACTIVE | Noted: 2022-10-28

## 2022-10-28 PROBLEM — R91.1 LUNG NODULE: Status: RESOLVED | Noted: 2022-10-28 | Resolved: 2022-10-28

## 2022-10-28 PROCEDURE — 99396 PREV VISIT EST AGE 40-64: CPT | Performed by: INTERNAL MEDICINE

## 2022-10-28 NOTE — PROGRESS NOTES
Subjective     Yi Hurtado is a 57 y.o. female who presents for a complete physical exam.      History of Present Illness     The following data was reviewed by: Thao Vega MD on 10/28/2022:  Common labs    Common Labs 10/21/22 10/21/22 10/21/22    0825 0825 0825   Glucose  87    BUN  11    Creatinine  0.66    Sodium  142    Potassium  5.3 (A)    Chloride  106    Calcium  9.6    Total Protein  6.2    Albumin  4.10    Total Bilirubin  0.4    Alkaline Phosphatase  83    AST (SGOT)  20    ALT (SGPT)  12    WBC 6.86     Hemoglobin 12.6     Hematocrit 37.8     Platelets 311     Total Cholesterol   179   Triglycerides   67   HDL Cholesterol   76 (A)   LDL Cholesterol    90   (A) Abnormal value       Comments are available for some flowsheets but are not being displayed.               Review of Systems   Constitutional: Negative.    HENT: Negative.    Eyes: Negative.    Respiratory: Negative.    Cardiovascular: Negative.    Gastrointestinal: Negative.    Endocrine: Negative.    Genitourinary: Negative.    Musculoskeletal: Positive for arthralgias and back pain.   Skin: Negative.    Allergic/Immunologic: Negative.    Neurological: Negative.    Hematological: Negative.    Psychiatric/Behavioral: Negative.        The following portions of the patient's history were reviewed and updated as appropriate: allergies, current medications, past family history, past medical history, past social history, past surgical history and problem list.  Health maintenance tab was reviewed and updated with the patient.       Patient Active Problem List    Diagnosis Date Noted   • Vitamin D deficiency 03/27/2018   • Mild intermittent asthma without complication 02/06/2017   • Light tobacco smoker <10 cigarettes per day 02/06/2017   • Allergic rhinitis 01/29/2016       Past Medical History:   Diagnosis Date   • Allergic     sulpha, cipro   • Asthma    • Blood tests for routine general physical examination    • Dysphagia 06/10/2013      Leana Greer   • Earache    • Eustachian tube dysfunction    • Injury of back     lumbar bulging discs, unsure level   • Irritable bowel syndrome    • Lung nodule 10/28/2022   • Myelopathy (formerly [facility]) 1984    Dr. Ross Fernandez and Dr. Moises Orozco   • Seizures (formerly [facility])    • Simple goiter    • Spinal stenosis    • Urinary tract infection        Past Surgical History:   Procedure Laterality Date   • APPENDECTOMY N/A 1969   • CATARACT EXTRACTION, BILATERAL Bilateral 3/2017, 4/2017   • ENDOSCOPY AND COLONOSCOPY N/A 06/10/2013    Mild suggestion of a ringed esophagus, biopsied to r/o eosinophilic esophagitis-Dr. Leana Greer   • MAMMO FINE NEEDLE ASPIRATION UNILATERAL Right 2013    pathology negative   • PILONIDAL CYSTECTOMY N/A    • VAGINAL HYSTERECTOMY SALPINGO OOPHORECTOMY WITH ANTERIOR AND POSTERIOR VAGINAL REPAIR N/A 04/27/2011    Total vaginal hysterectomy, external Andrews's culdoplasty and fulguration of implant of culk-de-sac endometriosis-Dr. Gildardo Howard       Family History   Problem Relation Age of Onset   • Dementia Mother    • Hypothyroidism Mother    • Hypertension Mother    • Diabetes type II Mother    • Stroke Mother    • Thyroid cancer Mother    • Alcohol abuse Father    • Other Father         acute heart attack   • Heart disease Father    • ADD / ADHD Son        Social History     Socioeconomic History   • Marital status:      Spouse name: Angel Luis   • Number of children: 2   Tobacco Use   • Smoking status: Every Day     Packs/day: 0.10     Years: 39.00     Pack years: 3.90     Types: Cigarettes     Start date: 1/6/1979   • Smokeless tobacco: Never   Vaping Use   • Vaping Use: Never used   Substance and Sexual Activity   • Alcohol use: Yes     Alcohol/week: 1.0 standard drink     Types: 1 Standard drinks or equivalent per week   • Sexual activity: Defer       Current Outpatient Medications on File Prior to Visit   Medication Sig Dispense Refill   • albuterol sulfate  (90 Base) MCG/ACT inhaler  "Inhale 2 puffs Every 4 (Four) Hours As Needed for Wheezing or Shortness of Air. 1 inhaler 3   • ALLERGY SERUM INJECTION 2 injections per week     • conjugated estrogens (Premarin) 0.625 MG/GM vaginal cream Apply pea sized amount three three weekly. 30 g 1   • fexofenadine (ALLEGRA) 180 MG tablet Take 180 mg by mouth 2 (Two) Times a Day.     • Flaxseed, Linseed, (FLAX SEEDS PO) Take 1 tablet by mouth Daily.     • Multiple Vitamin (MULTI VITAMIN PO) Take 1 tablet by mouth Daily.     • nitrofurantoin, macrocrystal-monohydrate, (Macrobid) 100 MG capsule Take 1 capsule by mouth 2 (Two) Times a Day. 14 capsule 0   • Omega-3 Fatty Acids (FISH OIL PO) Take 1 tablet by mouth daily.     • Triamcinolone Acetonide (NASACORT AQ NA) 1 spray into each nostril Daily As Needed.     • [DISCONTINUED] ciclopirox (PENLAC) 8 % solution APPLY TOPICALLY TO THE APPROPRIATE AREA AS DIRECTED EVERY NIGHT. 6.6 mL 1   • [DISCONTINUED] cefdinir (OMNICEF) 300 MG capsule Take 1 capsule by mouth 2 (Two) Times a Day. 14 capsule 0     No current facility-administered medications on file prior to visit.       Allergies   Allergen Reactions   • Ciprofloxacin Swelling   • Sulfa Antibiotics Mental Status Change       Immunization History   Administered Date(s) Administered   • COVID-19 (PFIZER) PURPLE CAP 02/22/2021, 03/15/2021, 11/13/2021   • Covid-19 (Pfizer) Gray Cap 05/08/2022   • Pneumococcal Polysaccharide (PPSV23) 07/22/2020   • TD Preservative Free 08/04/2020   • Tdap 05/01/2008       Objective     /88   Pulse 64   Ht 174 cm (68.5\")   Wt 83.9 kg (185 lb)   SpO2 98%   BMI 27.72 kg/m²     Physical Exam  Constitutional:       Appearance: She is well-developed.   HENT:      Head: Normocephalic and atraumatic.      Right Ear: Hearing, tympanic membrane and external ear normal.      Left Ear: Hearing, tympanic membrane and external ear normal.      Nose: Nose normal.   Neck:      Thyroid: No thyromegaly.   Cardiovascular:      Rate and " Rhythm: Normal rate and regular rhythm.      Heart sounds: Normal heart sounds. No murmur heard.  Pulmonary:      Effort: Pulmonary effort is normal.      Breath sounds: Normal breath sounds.   Abdominal:      General: There is no distension.      Palpations: Abdomen is soft.      Tenderness: There is no abdominal tenderness.   Musculoskeletal:      Cervical back: Neck supple.   Lymphadenopathy:      Cervical: No cervical adenopathy.   Skin:     General: Skin is warm and dry.      Comments: Changes of onychomycosis.     Neurological:      Mental Status: She is alert and oriented to person, place, and time.   Psychiatric:         Speech: Speech normal.         Behavior: Behavior normal.         Thought Content: Thought content normal.         Judgment: Judgment normal.         Assessment & Plan   Diagnoses and all orders for this visit:    1. Well adult exam (Primary)    2. Menopause  -     DEXA Bone Density Axial; Future    3. Screening for osteoporosis  -     DEXA Bone Density Axial; Future    Other orders  -     ciclopirox (PENLAC) 8 % solution; Apply  topically to the appropriate area as directed Every Night.  Dispense: 6 mL; Refill: 0        Discussion    Patient presents today for a CPE.      Patient follows a healthy diet.   Patient follows an adequate exercise regimen. Mammogram is up to date.   Colon cancer screening is up to date.   Pap smears are performed by the patient's gynecologist.  I recommend a diet high in fruits, vegetables, whole grains, lean meats, nuts and beans.  I recommend limiting red meat, full fat dairy, eggs and processed white carbohydrates.  I recommend aerobic exercise at least 3 days per week.  We discussed working on balance.            Health Maintenance   Topic Date Due   • ANNUAL PHYSICAL  10/23/2022   • ZOSTER VACCINE (1 of 2) 10/28/2022 (Originally 1/6/2013)   • COVID-19 Vaccine (5 - Booster for Pfizer series) 10/30/2022 (Originally 7/3/2022)   • Pneumococcal Vaccine 0-64 (2 -  PCV) 10/28/2023 (Originally 7/22/2021)   • COLORECTAL CANCER SCREENING  06/10/2023   • MAMMOGRAM  12/03/2023   • DXA SCAN  04/25/2024   • TDAP/TD VACCINES (3 - Td or Tdap) 08/04/2030   • HEPATITIS C SCREENING  Completed   • INFLUENZA VACCINE  Discontinued            Future Appointments   Date Time Provider Department Center   10/27/2023  8:00 AM [facility]   11/3/2023  3:30 PM Thao Vega MD [facility]

## 2022-11-05 ENCOUNTER — HOSPITAL ENCOUNTER (OUTPATIENT)
Dept: BONE DENSITY | Facility: HOSPITAL | Age: 57
Discharge: HOME | End: 2022-11-05
Admitting: INTERNAL MEDICINE

## 2022-11-05 DIAGNOSIS — Z78.0 MENOPAUSE: ICD-10-CM

## 2022-11-05 DIAGNOSIS — Z13.820 SCREENING FOR OSTEOPOROSIS: ICD-10-CM

## 2022-11-05 PROCEDURE — 77080 DXA BONE DENSITY AXIAL: CPT

## 2023-01-27 ENCOUNTER — TELEPHONE (OUTPATIENT)
Dept: INTERNAL MEDICINE | Facility: CLINIC | Age: 58
End: 2023-01-27

## 2023-01-27 NOTE — TELEPHONE ENCOUNTER
Caller: Hurtado Yi    Relationship: Self    Best call back number: [phone]    What is the medical concern/diagnosis: MAMMOGRAM     What specialty or service is being requested: MAMMOGRAM     What is the provider, practice or medical service name: [facility]     What is the office location: [address] [place] [address]    What is the office phone number: [phone]    Any additional details: PATIENT STATES HER PROVIDER RETIRED.     PLEASE ADVISE.

## 2023-02-28 ENCOUNTER — TELEPHONE (OUTPATIENT)
Dept: INTERNAL MEDICINE | Facility: CLINIC | Age: 58
End: 2023-02-28

## 2023-02-28 DIAGNOSIS — Z12.31 ENCOUNTER FOR SCREENING MAMMOGRAM FOR BREAST CANCER: Primary | ICD-10-CM

## 2023-02-28 NOTE — TELEPHONE ENCOUNTER
Caller: Yi Hurtado    Relationship: Self    Best call back number: [phone]    What orders are you requesting (i.e. lab or imaging): ORDER FOR ROUTINE MAMOGRAM    In what timeframe would the patient need to come in: ASAP

## 2023-03-17 ENCOUNTER — TELEPHONE (OUTPATIENT)
Dept: INTERNAL MEDICINE | Facility: CLINIC | Age: 58
End: 2023-03-17
Payer: COMMERCIAL

## 2023-04-15 ENCOUNTER — HOSPITAL ENCOUNTER (OUTPATIENT)
Dept: MAMMOGRAPHY | Facility: HOSPITAL | Age: 58
Discharge: HOME | End: 2023-04-15
Admitting: INTERNAL MEDICINE
Payer: COMMERCIAL

## 2023-04-15 DIAGNOSIS — Z12.31 ENCOUNTER FOR SCREENING MAMMOGRAM FOR BREAST CANCER: ICD-10-CM

## 2023-04-15 PROCEDURE — 77063 BREAST TOMOSYNTHESIS BI: CPT

## 2023-04-15 PROCEDURE — 77067 SCR MAMMO BI INCL CAD: CPT

## 2023-05-11 ENCOUNTER — TELEPHONE (OUTPATIENT)
Dept: INTERNAL MEDICINE | Facility: CLINIC | Age: 58
End: 2023-05-11
Payer: COMMERCIAL

## 2023-05-11 NOTE — TELEPHONE ENCOUNTER
Caller: Yi Hurtado    Relationship: Self    Best call back number:[phone]    What is the best time to reach you: ANY     Who are you requesting to speak with (clinical staff, provider,  specific staff member): CLINICAL STAFF     What was the call regarding: WOULD LIKE TO KNOW WHEN SHE SHOULD GET ANOTHER COLONOSCOPY     Do you require a callback: YES

## 2023-05-15 DIAGNOSIS — Z12.11 COLON CANCER SCREENING: Primary | ICD-10-CM

## 2023-08-02 ENCOUNTER — OFFICE VISIT (OUTPATIENT)
Dept: INTERNAL MEDICINE | Facility: CLINIC | Age: 58
End: 2023-08-02
Payer: COMMERCIAL

## 2023-08-02 VITALS
OXYGEN SATURATION: 98 % | HEART RATE: 64 BPM | WEIGHT: 185 LBS | SYSTOLIC BLOOD PRESSURE: 128 MMHG | BODY MASS INDEX: 27.4 KG/M2 | HEIGHT: 69 IN | DIASTOLIC BLOOD PRESSURE: 91 MMHG
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
## 2023-08-02 DIAGNOSIS — M25.552 LEFT HIP PAIN: ICD-10-CM

## 2023-08-09 ENCOUNTER — PREP FOR SURGERY (OUTPATIENT)
Dept: OTHER | Facility: HOSPITAL | Age: 58
End: 2023-08-09
Payer: COMMERCIAL

## 2023-08-09 DIAGNOSIS — Z12.11 SCREENING FOR MALIGNANT NEOPLASM OF COLON: Primary | ICD-10-CM

## 2023-08-17 PROBLEM — Z12.11 SCREENING FOR MALIGNANT NEOPLASM OF COLON: Status: ACTIVE | Noted: 2023-08-17

## 2023-08-28 ENCOUNTER — TELEPHONE (OUTPATIENT)
Dept: INTERNAL MEDICINE | Facility: CLINIC | Age: 58
End: 2023-08-28

## 2023-08-28 ENCOUNTER — TELEMEDICINE (OUTPATIENT)
Dept: INTERNAL MEDICINE | Facility: CLINIC | Age: 58
End: 2023-08-28
Payer: COMMERCIAL

## 2023-08-28 DIAGNOSIS — U07.1 COVID-19 VIRUS DETECTED: Primary | ICD-10-CM

## 2023-08-28 PROCEDURE — 99213 OFFICE O/P EST LOW 20 MIN: CPT | Performed by: INTERNAL MEDICINE

## 2023-08-28 NOTE — PROGRESS NOTES
Subjective     Yi Hurtado is a 58 y.o. female who presents with No chief complaint on file.      History of Present Illness     You have chosen to receive care through a telehealth visit.  Do you consent to use a video/audio connection for your medical care today? Yes  Provider is located in [place].  The patient is located in [place].      Started yesterday with symptoms.  Fever, chills and achy.  Nausea.  Mild cough.  Runny nose.        Review of Systems   Constitutional:  Positive for fatigue.     The following portions of the patient's history were reviewed and updated as appropriate: allergies, current medications and problem list.    Patient Active Problem List    Diagnosis Date Noted    Screening for malignant neoplasm of colon 08/17/2023     Note Last Updated: 8/17/2023     Added automatically from request for surgery [id number]      Vitamin D deficiency 03/27/2018    Mild intermittent asthma without complication 02/06/2017    Light tobacco smoker <10 cigarettes per day 02/06/2017    Allergic rhinitis 01/29/2016       Current Outpatient Medications on File Prior to Visit   Medication Sig Dispense Refill    albuterol sulfate  (90 Base) MCG/ACT inhaler Inhale 2 puffs Every 4 (Four) Hours As Needed for Wheezing or Shortness of Air. 1 inhaler 3    ALLERGY SERUM INJECTION 2 injections per week      ciclopirox (PENLAC) 8 % solution Apply  topically to the appropriate area as directed Every Night. 6 mL 0    conjugated estrogens (Premarin) 0.625 MG/GM vaginal cream Apply pea sized amount three three weekly. 30 g 1    fexofenadine (ALLEGRA) 180 MG tablet Take 1 tablet by mouth 2 (Two) Times a Day.      Flaxseed, Linseed, (FLAX SEEDS PO) Take 1 tablet by mouth Daily.      Multiple Vitamin (MULTI VITAMIN PO) Take 1 tablet by mouth Daily.      nitrofurantoin, macrocrystal-monohydrate, (Macrobid) 100 MG capsule Take 1 capsule by mouth 2 (Two) Times a Day. 14 capsule 0    Omega-3 Fatty Acids (FISH OIL PO) Take 1 tablet  by mouth daily.      Triamcinolone Acetonide (NASACORT AQ NA) 1 spray into each nostril Daily As Needed.       No current facility-administered medications on file prior to visit.       Objective     There were no vitals taken for this visit.    Physical Exam  Constitutional:       Appearance: She is well-developed.   HENT:      Head: Normocephalic and atraumatic.   Pulmonary:      Effort: Pulmonary effort is normal.   Neurological:      Mental Status: She is alert and oriented to person, place, and time.   Psychiatric:         Behavior: Behavior normal.       Assessment & Plan   Diagnoses and all orders for this visit:    1. COVID-19 virus detected (Primary)        Discussion    I recommend attention to rest and fluids. I recommend adding OTC vitamin D, C and zinc. You should quarantine for ten days.  It can be five days if you are feeling better and fever free but you should continue to wear a mask for a total of ten days around others. Paxlovid is an FDA approved for emergency use.  Important considerations include numerous drug interactions.  Symptoms could rebound in some after a course of this medication according to recent reports.    Reasons to go to the ER include severe trouble breathing, chest pain, confusion, inability to wake or stay awake, and bluish lips or face.  Continue supportive measures and let me know if there is any change in symptoms.         Future Appointments   Date Time Provider Department Center   10/27/2023  8:00 AM [facility]   11/3/2023  3:30 PM Thao Vega MD [facility]

## 2023-08-28 NOTE — TELEPHONE ENCOUNTER
Caller: Yi Hurtado    Relationship to patient: Self    Best call back number: [phone]     Date of exposure: UNKNOWN    Date of positive COVID19 test: HOME TEST 08/28/23 / SYMPTOMS STARTED 08/27/23    COVID19 symptoms: FEVER / CHILLS / CONGESTION / BODY ACHES / CHILLS / HEADACHE     Date of initial quarantine:     Additional information or concerns: PATIENT WOULD LIKE TO KNOW IF THERE IS ANYTHING THAT COULD BE PRESCRIBED FOR SYMPTOMS     What is the patients preferred pharmacy: [facility]/pharmacy #[id number] - [place], [place] - [address] AT [place] [facility] - [phone] [facility] [phone]  [phone]

## 2023-09-08 ENCOUNTER — OFFICE VISIT (OUTPATIENT)
Dept: INTERNAL MEDICINE | Facility: CLINIC | Age: 58
End: 2023-09-08
Payer: COMMERCIAL

## 2023-09-08 VITALS
HEIGHT: 69 IN | BODY MASS INDEX: 27.25 KG/M2 | HEART RATE: 67 BPM | WEIGHT: 184 LBS | DIASTOLIC BLOOD PRESSURE: 72 MMHG | SYSTOLIC BLOOD PRESSURE: 120 MMHG | OXYGEN SATURATION: 97 %

## 2023-09-08 DIAGNOSIS — U07.1 COVID-19 VIRUS DETECTED: Primary | ICD-10-CM

## 2023-09-08 DIAGNOSIS — R06.02 SHORTNESS OF BREATH: ICD-10-CM

## 2023-09-08 PROCEDURE — 99213 OFFICE O/P EST LOW 20 MIN: CPT | Performed by: INTERNAL MEDICINE

## 2023-09-08 RX ORDER — ALBUTEROL SULFATE 90 UG/1
2 AEROSOL, METERED RESPIRATORY (INHALATION) EVERY 4 HOURS PRN
Qty: 18 G | Refills: 0 | Status: SHIPPED | OUTPATIENT
Start: 2023-09-08

## 2023-09-08 NOTE — PROGRESS NOTES
Subjective     Yi Hurtado is a 58 y.o. female who presents with   Chief Complaint   Patient presents with    Shortness of Breath       History of Present Illness     C/o SOA.  She recently had COVID.  She was doing well until Wednesday.  She was around some allergens.  She is wheezing and coughing.  No fever.  Today she is better.  SOA mainly occurs when outdoors.  Better when in air conditioning.    She thinks stopping her allergy shots when COVID started is likely contributing.      Review of Systems   Respiratory: Negative.     Cardiovascular: Negative.      The following portions of the patient's history were reviewed and updated as appropriate: allergies, current medications and problem list.    Patient Active Problem List    Diagnosis Date Noted    Screening for malignant neoplasm of colon 08/17/2023     Note Last Updated: 8/17/2023     Added automatically from request for surgery [id number]      Vitamin D deficiency 03/27/2018    Mild intermittent asthma without complication 02/06/2017    Light tobacco smoker <10 cigarettes per day 02/06/2017    Allergic rhinitis 01/29/2016       Current Outpatient Medications on File Prior to Visit   Medication Sig Dispense Refill    albuterol sulfate  (90 Base) MCG/ACT inhaler Inhale 2 puffs Every 4 (Four) Hours As Needed for Wheezing or Shortness of Air. 1 inhaler 3    ALLERGY SERUM INJECTION 2 injections per week      ciclopirox (PENLAC) 8 % solution Apply  topically to the appropriate area as directed Every Night. 6 mL 0    conjugated estrogens (Premarin) 0.625 MG/GM vaginal cream Apply pea sized amount three three weekly. 30 g 1    fexofenadine (ALLEGRA) 180 MG tablet Take 1 tablet by mouth 2 (Two) Times a Day.      Flaxseed, Linseed, (FLAX SEEDS PO) Take 1 tablet by mouth Daily.      Multiple Vitamin (MULTI VITAMIN PO) Take 1 tablet by mouth Daily.      Omega-3 Fatty Acids (FISH OIL PO) Take 1 tablet by mouth daily.      Triamcinolone Acetonide (NASACORT AQ NA) 1  "spray into each nostril Daily As Needed.      Nirmatrelvir&Ritonavir 300/100 (PAXLOVID) 20 x 150 MG & 10 x 100MG tablet therapy pack tablet Take 3 tablets by mouth 2 (Two) Times a Day. (Patient not taking: Reported on 9/8/2023) 30 tablet 0    nitrofurantoin, macrocrystal-monohydrate, (Macrobid) 100 MG capsule Take 1 capsule by mouth 2 (Two) Times a Day. (Patient not taking: Reported on 9/8/2023) 14 capsule 0     No current facility-administered medications on file prior to visit.       Objective     /72   Pulse 67   Ht 174 cm (68.5\")   Wt 83.5 kg (184 lb)   SpO2 97%   BMI 27.57 kg/m²     Physical Exam  Constitutional:       Appearance: She is well-developed.   HENT:      Head: Normocephalic and atraumatic.   Cardiovascular:      Rate and Rhythm: Normal rate and regular rhythm.      Heart sounds: Normal heart sounds.   Pulmonary:      Effort: Pulmonary effort is normal.      Breath sounds: Normal breath sounds.   Skin:     General: Skin is warm and dry.   Neurological:      Mental Status: She is alert and oriented to person, place, and time.   Psychiatric:         Behavior: Behavior normal.     X-rays of the chest  performed today for following indication:   cough.  X-ray reveal NAD.  There is no available x-ray for comparison.  X-ray sent to radiology for official interpretation and findings.        Assessment & Plan   Diagnoses and all orders for this visit:    1. COVID-19 virus detected (Primary)  -     XR Chest PA & Lateral    2. Shortness of breath  -     XR Chest PA & Lateral    Other orders  -     albuterol sulfate  (90 Base) MCG/ACT inhaler; Inhale 2 puffs Every 4 (Four) Hours As Needed for Wheezing or Shortness of Air.  Dispense: 18 g; Refill: 0        Discussion    Patient recovering from COVID presents with increased SOA while outdoors.  She has asthma and allergies.  She thinks her fall allergies are acting up which makes sense.  She is improved today staying indoors.  Refill given of " albuterol inhaler.  She is interested in getting back on allergy shots.  She is going to make a f/u appointment with allergy.         Future Appointments   Date Time Provider Department Center   10/27/2023  8:00 AM [facility]   11/3/2023  3:30 PM Thao Vega MD [facility]

## 2023-10-02 ENCOUNTER — TELEPHONE (OUTPATIENT)
Dept: INTERNAL MEDICINE | Facility: CLINIC | Age: 58
End: 2023-10-02

## 2023-10-02 DIAGNOSIS — Z00.00 LABORATORY TESTS ORDERED AS PART OF A COMPLETE PHYSICAL EXAM (CPE): Primary | ICD-10-CM

## 2023-10-02 NOTE — TELEPHONE ENCOUNTER
Caller: Yi Hurtado    Relationship: Self    Best call back number: [phone]     What orders are you requesting (i.e. lab or imaging): LABS    In what timeframe would the patient need to come in: PRIOR TO UPCOMING 11/3/23 PHYSICAL    Where will you receive your lab/imaging services: [facility]    Additional notes: PATIENT STATES THAT SHE WOULD LIKE DR. MING BROWNING TO ADD LAB ORDERS TO CHECK HORMONE AND ESTROGEN LEVELS AT HER 10/27/23 LAB APPOINTMENT.    PLEASE ADVISE.

## 2023-10-03 RX ORDER — ALBUTEROL SULFATE 90 UG/1
AEROSOL, METERED RESPIRATORY (INHALATION)
Qty: 18 G | Refills: 1 | Status: SHIPPED | OUTPATIENT
Start: 2023-10-03

## 2023-10-10 ENCOUNTER — OFFICE VISIT (OUTPATIENT)
Dept: INTERNAL MEDICINE | Facility: CLINIC | Age: 58
End: 2023-10-10
Payer: COMMERCIAL

## 2023-10-10 VITALS
DIASTOLIC BLOOD PRESSURE: 90 MMHG | HEART RATE: 61 BPM | WEIGHT: 182 LBS | BODY MASS INDEX: 26.96 KG/M2 | SYSTOLIC BLOOD PRESSURE: 140 MMHG | OXYGEN SATURATION: 98 % | HEIGHT: 69 IN

## 2023-10-10 DIAGNOSIS — M54.50 CHRONIC BILATERAL LOW BACK PAIN, UNSPECIFIED WHETHER SCIATICA PRESENT: ICD-10-CM

## 2023-10-10 DIAGNOSIS — R29.2 HYPERREFLEXIA: ICD-10-CM

## 2023-10-10 DIAGNOSIS — M48.061 SPINAL STENOSIS OF LUMBAR REGION, UNSPECIFIED WHETHER NEUROGENIC CLAUDICATION PRESENT: ICD-10-CM

## 2023-10-10 DIAGNOSIS — R29.898 RIGHT LEG WEAKNESS: Primary | ICD-10-CM

## 2023-10-10 DIAGNOSIS — G89.29 CHRONIC BILATERAL LOW BACK PAIN, UNSPECIFIED WHETHER SCIATICA PRESENT: ICD-10-CM

## 2023-10-10 PROCEDURE — 99213 OFFICE O/P EST LOW 20 MIN: CPT | Performed by: INTERNAL MEDICINE

## 2023-10-10 RX ORDER — MONTELUKAST SODIUM 10 MG/1
10 TABLET ORAL NIGHTLY
COMMUNITY

## 2023-10-10 NOTE — PROGRESS NOTES
Subjective     Yi Hurtado is a 58 y.o. female who presents with   Chief Complaint   Patient presents with    Back Pain       Back Pain  Associated symptoms include weakness. Pertinent negatives include no numbness.        C/o fall one week ago.  Her was standing and turned.  Her right leg gave out on her and she fell down.  No pain associated in the hip or knee.  This is second time it happened.  She struck her right elbow, right knee and head.  Back pain has been worse recently.      Review of Systems   Musculoskeletal:  Positive for back pain.   Neurological:  Positive for weakness. Negative for dizziness and numbness.       The following portions of the patient's history were reviewed and updated as appropriate: allergies, current medications and problem list.    Patient Active Problem List    Diagnosis Date Noted    Screening for malignant neoplasm of colon 08/17/2023     Note Last Updated: 8/17/2023     Added automatically from request for surgery [id number]      Vitamin D deficiency 03/27/2018    Mild intermittent asthma without complication 02/06/2017    Light tobacco smoker <10 cigarettes per day 02/06/2017    Allergic rhinitis 01/29/2016       Current Outpatient Medications on File Prior to Visit   Medication Sig Dispense Refill    albuterol sulfate  (90 Base) MCG/ACT inhaler INHALE 2 PUFFS EVERY 4 HOURS AS NEEDED FOR WHEEZING OR SHORTNESS OF AIR 18 g 1    ALLERGY SERUM INJECTION 2 injections per week      ciclopirox (PENLAC) 8 % solution Apply  topically to the appropriate area as directed Every Night. 6 mL 0    conjugated estrogens (Premarin) 0.625 MG/GM vaginal cream Apply pea sized amount three three weekly. 30 g 1    fexofenadine (ALLEGRA) 180 MG tablet Take 1 tablet by mouth 2 (Two) Times a Day.      Flaxseed, Linseed, (FLAX SEEDS PO) Take 1 tablet by mouth Daily.      Fluticasone Furoate-Vilanterol (BREO ELLIPTA IN) Inhale.      montelukast (SINGULAIR) 10 MG tablet Take 1 tablet by mouth Every  "Night.      Multiple Vitamin (MULTI VITAMIN PO) Take 1 tablet by mouth Daily.      Omega-3 Fatty Acids (FISH OIL PO) Take 1 tablet by mouth daily.      Triamcinolone Acetonide (NASACORT AQ NA) 1 spray into each nostril Daily As Needed.       No current facility-administered medications on file prior to visit.       Objective     /90   Pulse 61   Ht 174 cm (68.5\")   Wt 82.6 kg (182 lb)   SpO2 98%   BMI 27.27 kg/mý     Physical Exam  Constitutional:       Appearance: She is well-developed.   HENT:      Head: Normocephalic and atraumatic.   Pulmonary:      Effort: Pulmonary effort is normal.   Neurological:      Mental Status: She is alert and oriented to person, place, and time.      Cranial Nerves: Cranial nerves 2-12 are intact.      Sensory: Sensation is intact.      Motor: Motor function is intact.      Coordination: Coordination is intact.      Deep Tendon Reflexes:      Reflex Scores:       Bicep reflexes are 2+ on the right side and 2+ on the left side.       Brachioradialis reflexes are 2+ on the right side and 2+ on the left side.       Patellar reflexes are 3+ on the right side and 3+ on the left side.       Achilles reflexes are 2+ on the right side and 2+ on the left side.  Psychiatric:         Behavior: Behavior normal.         Assessment & Plan   Diagnoses and all orders for this visit:    1. Right leg weakness (Primary)  -     MRI Lumbar Spine Without Contrast; Future  -     Ambulatory Referral to Physical Therapy Evaluate and treat    2. Hyperreflexia  -     MRI Lumbar Spine Without Contrast; Future  -     Ambulatory Referral to Physical Therapy Evaluate and treat    3. Chronic bilateral low back pain, unspecified whether sciatica present  -     MRI Lumbar Spine Without Contrast; Future  -     Ambulatory Referral to Physical Therapy Evaluate and treat    4. Spinal stenosis of lumbar region, unspecified whether neurogenic claudication present  -     MRI Lumbar Spine Without Contrast; " Future  -     Ambulatory Referral to Physical Therapy Evaluate and treat        Discussion    Patient with history of chronic low back pain, spinal stenosis presents with two episodes of right leg giving out on her.  Exam is notably for hyperreflexia.  Evaluation by MRI is ordered.  She is referred for physical therapy.  She requests referral to neurosurgery which we can do after MRI is complete.         Future Appointments   Date Time Provider Department Center   10/27/2023  8:00 AM [facility]   11/3/2023  3:30 PM Thao Vega MD [facility]

## 2023-10-16 ENCOUNTER — TREATMENT (OUTPATIENT)
Dept: PHYSICAL THERAPY | Facility: CLINIC | Age: 58
End: 2023-10-16
Payer: COMMERCIAL

## 2023-10-16 DIAGNOSIS — R29.898 WEAKNESS OF BOTH LOWER EXTREMITIES: ICD-10-CM

## 2023-10-16 DIAGNOSIS — M54.9 CHRONIC BACK PAIN, UNSPECIFIED BACK LOCATION, UNSPECIFIED BACK PAIN LATERALITY: Primary | ICD-10-CM

## 2023-10-16 DIAGNOSIS — G89.29 CHRONIC BACK PAIN, UNSPECIFIED BACK LOCATION, UNSPECIFIED BACK PAIN LATERALITY: Primary | ICD-10-CM

## 2023-10-16 DIAGNOSIS — M25.552 LEFT HIP PAIN: ICD-10-CM

## 2023-10-16 PROCEDURE — 97110 THERAPEUTIC EXERCISES: CPT

## 2023-10-16 PROCEDURE — 97535 SELF CARE MNGMENT TRAINING: CPT

## 2023-10-16 PROCEDURE — 97161 PT EVAL LOW COMPLEX 20 MIN: CPT

## 2023-10-16 NOTE — PROGRESS NOTES
Physical Therapy Initial Evaluation and Plan of Care  [facility] [address], [address]  [place] [address]     Patient: Yi Hurtado   : 1965  Referring practitioner: Thao Vega MD  Date of Initial Visit: 10/16/2023  Today's Date: 10/16/2023  Patient seen for 1 sessions  PT Clinic location: [address], [address] [place] [address].  [id number]          Visit Diagnoses:    ICD-10-CM ICD-9-CM   1. Chronic back pain, unspecified back location, unspecified back pain laterality  M54.9 724.5    G89.29 338.29   2. Left hip pain  M25.552 719.45   3. Weakness of both lower extremities  R29.898 729.89       Subjective Questionnaire: Oswestry: 44%  LEFS: 56    Subjective Evaluation    History of Present Illness  Mechanism of injury: When I was 19 years old I had an episode of spinal myelopathy. 14 years ago I started having back issues and was off for 14 weeks. They found a herniated disc that was protruding into the spinal cord and they found some stenosis. About a month ago this started happening again. I have pain in my lower back and have pain in my hip that occasionally goes down to the back of my knee and down into my toes. Right now it is down into my toes but only the two on the outside. I have recently fallen two times at work and it feels like my left leg gives out with rotation. Last fall I hit my head. I seem to trip sometimes because I don't  my feet.  I can only sit for about an hour before the pain gets too bad. Standing is usually a better option. Sometimes walking decrease the pain.  I've been taking some Tylenol and it seems to help.   The pain occasionally keeps me up.   Stairs are okay and don't seem to increase my pain.       Patient Occupation: [employer] Quality of life: good    Pain  Current pain ratin  At best pain ratin  At worst pain ratin  Location: lumbar and hip  Quality:  discomfort and radiating (achy)  Relieving factors: medications (Tylenol and ibuprofen)  Exacerbated by: sitting.    Social Support  Lives in: multiple-level home    Treatments  Previous treatment: physical therapy  Patient Goals  Patient goals for therapy: decreased edema, decreased pain, improved balance, increased strength and independence with ADLs/IADLs         Medical history: Vitamin D deficiency. See chart for further detail.         Objective          Static Posture     Head  Forward.    Shoulders  Rounded.    Palpation   Left   No palpable tenderness to the erector spinae, gluteus ramses and iliopsoas.   Tenderness of the gluteus medius and piriformis.     Right   No palpable tenderness to the erector spinae, gluteus ramses, gluteus medius, lumbar paraspinals and piriformis.     Tenderness     Left Hip   Tenderness in the greater trochanter.     Right Hip   No tenderness in the greater trochanter.     Additional Tenderness Details  Hypomobile lumbar and thoracic spine with     Neurological Testing     Reflexes   Left   Patellar (L4): brisk (3+)  Achilles (S1): normal (2+)  Babinski sign: positive  Clonus sign: negative    Right   Patellar (L4): brisk (3+)  Achilles (S1): normal (2+)  Babinski sign: positive  Clonus sign: positive    Additional Neurological Details  Holloway's and all other reflexes bilaterally on UE and LE were hyperreflexic, the right side demonstrated an increase in response compared to the left both UE and LE  Bilateral hyperrefle    Active Range of Motion   Left Hip   Flexion: 98 degrees   External rotation (90/90): 20 degrees   Internal rotation (90/90): 25 degrees     Right Hip   Flexion: 120 degrees   External rotation (90/90): 25 degrees   Internal rotation (90/90): 30 degrees     Strength/Myotome Testing     Left Hip   Planes of Motion   Flexion: 4- (decreased indurance)  Extension: 3+  Abduction: 4-  External rotation: 3+  Internal rotation: 4-    Right Hip   Planes of  Motion   Flexion: 4-  Extension: 3+  Abduction: 4-  External rotation: 3+  Internal rotation: 4-    Left Knee   Flexion: 5  Extension: 5    Right Knee   Flexion: 5  Extension: 5    Left Ankle/Foot   Dorsiflexion: 4    Right Ankle/Foot   Dorsiflexion: 4    Tests     Left Hip   Positive FADIR and scour.   Negative NAIMA.   SLR: Negative.     Right Hip   Negative NAIMA, FADIR and scour.   SLR: Negative.           Assessment & Plan       Assessment  Impairments: abnormal or restricted ROM, activity intolerance, impaired physical strength, lacks appropriate home exercise program, pain with function and safety issue   Functional limitations: carrying objects, lifting, sleeping, walking, uncomfortable because of pain, moving in bed, sitting and unable to perform repetitive tasks   Assessment details: Pt is a 58 year old female who presents to PT with symptoms consistent with non-specific low back pain low back pain and findings consistent with mild hip OA on the left LE. Upon initial evaluation patient presents with the following deficits and impairments: bilateral LE weakness, limited L hip ROM, decreased lumbar and thoracic mobility, hyperreflexic reflexes and some numbness and tingling associated with sitting for a prolonged period of time. These impairments are making it difficult for the patient to sit for more than an hour at work making it hard to complete a full days of work, ADLs, walking with friends, sleeping, and other work related tasks. Pt would benefit from skilled PT intervention to address the deficits noted.     Prognosis: good    Goals  Plan Goals: SHORT TERM GOALS: Time for Goal Achievement: 4 weeks  1.  Patient to be compliant with HEP and progression of HEP to demonstrate patient's understanding of importance of movement and maintaining strength even after discharge.                             2.  Pain level < 4/10 at worst with mentioned activities to improve function  3.  Increased thoracic, lumbar  and SIJ mobility to allow for increased lumbar AROM with less pain to improve patient's ability to complete work tasks.   4.  Increased lumbar AROM to by 25% in all planes to allow for increased ease with sit-stand transfers and functional activities    LONG TERM GOALS: Time for Goal Achievement: 8 weeks  1.  Pt. to score < 20 % on Back Index and an improvement from a 56 to a 66 on the LEFS to show subjective improvement with daily activities.  2.  Pain level < 2/10 with all listed activities to return to normal.  3.  Lumbar and hip AROM to WFL to allow for return to household & recreational activities w/o increased symptoms to improve her safety with movements in her work environment.   4.  (B) LE strength to 5/5 to allow for pushing, pulling and activities to occur without pain (driving, sitting, household  & Job requirements) to improve safety within her work environment.         Plan  Therapy options: will be seen for skilled therapy services  Planned modality interventions: cryotherapy, electrical stimulation/Russian stimulation, TENS, thermotherapy (hydrocollator packs), ultrasound and dry needling  Other planned modality interventions: Cupping  Planned therapy interventions: body mechanics training, flexibility, functional ROM exercises, home exercise program, manual therapy, neuromuscular re-education, postural training, spinal/joint mobilization, stretching, strengthening, soft tissue mobilization, therapeutic activities, motor coordination training and joint mobilization  Frequency: 2x week  Duration in weeks: 12  Treatment plan discussed with: patient        See flowsheets for treatment detail.  Education: Discussed with patient the findings with her response to reflex testings and her HEP.     History # of Personal Factors and/or Comorbidities: MODERATE (1-2)  Examination of Body System(s): # of elements: LOW (1-2)  Clinical Presentation: STABLE   Clinical Decision Making: LOW       Timed:         Manual  Therapy:    -     mins  90185;     Therapeutic Exercise:    20     mins  81635;     Neuromuscular Harlan:    -    mins  45132;    Therapeutic Activity:     -     mins  11366;     Gait Training:           mins  97195;     Ultrasound:          mins  18319;    Ionto                                   mins   71453  Self Care                       10     mins   52872      Un-Timed:  Electrical Stimulation:         mins  80893 ( );  Dry Needling          mins self-pay  Traction          mins 74059  Low Eval     35     Mins  27774  Mod Eval     -     Mins  40250  High Eval                       -     Mins  32615  Re-Eval                               mins  02069      Timed Treatment:   30   mins   Total Treatment:     65   mins    PT SIGNATURE: Nisha Mar PT   [place] PT license #: [id number]  DATE TREATMENT INITIATED: 10/16/2023    Initial Certification  Certification Period: 1/13/2024  I certify that the therapy services are furnished while this patient is under my care.  The services outlined above are required by this patient, and will be reviewed every 90 days.    PHYSICIAN: Thao Vega MD  NPI: [id number]                                      DATE:     Please sign and return via fax to [facility] - Fax #: [phone]. Thank you, [facility].

## 2023-10-20 ENCOUNTER — TREATMENT (OUTPATIENT)
Dept: PHYSICAL THERAPY | Facility: CLINIC | Age: 58
End: 2023-10-20
Payer: COMMERCIAL

## 2023-10-20 DIAGNOSIS — M25.552 LEFT HIP PAIN: ICD-10-CM

## 2023-10-20 DIAGNOSIS — M54.9 CHRONIC BACK PAIN, UNSPECIFIED BACK LOCATION, UNSPECIFIED BACK PAIN LATERALITY: Primary | ICD-10-CM

## 2023-10-20 DIAGNOSIS — G89.29 CHRONIC BACK PAIN, UNSPECIFIED BACK LOCATION, UNSPECIFIED BACK PAIN LATERALITY: Primary | ICD-10-CM

## 2023-10-20 DIAGNOSIS — R29.898 WEAKNESS OF BOTH LOWER EXTREMITIES: ICD-10-CM

## 2023-10-20 NOTE — PROGRESS NOTES
Physical Therapy Daily Treatment Note  [facility] [place]  [address], [address]  [place] [id number]     Patient: Yi Hurtado  : 1965  Referring practitioner: Thao Vega MD  Today's Date: 10/20/2023    VISIT#: 2    Visit Diagnoses:    ICD-10-CM ICD-9-CM   1. Chronic back pain, unspecified back location, unspecified back pain laterality  M54.9 724.5    G89.29 338.29   2. Left hip pain  M25.552 719.45   3. Weakness of both lower extremities  R29.898 729.89       Subjective   Yi Hurtado reports: Patient reports that her hip and back were feeling better after a couple days of performing her HEP until last night when her knee and hip started bothering her.       Objective       See Exercise, Manual, and Modality Logs for complete treatment.     Patient Education: Continue to complete HEP, added       Assessment/Plan  Patient had good tolerance to therapeutic exercises on this date with no report of increased pain in the hip, back, or knee. Required verbal cues for proper positioning with clamshells and bridges. Discussed following up with a neurologist due to the hyperreflexic responses with reflex testing.       Progress per Plan of Care and Progress strengthening /stabilization /functional activity          Timed:         Manual Therapy:    -     mins  21711;     Therapeutic Exercise:    35     mins  74067;     Neuromuscular Harlan:    -    mins  13114;    Therapeutic Activity:     10     mins  13139;     Gait Training:           mins  53596;     Ultrasound:          mins  83303;    Ionto:                                   mins  37800  Self Care:                       -     mins  82274    Un-Timed:  Electrical Stimulation:         mins  94683 ( );  Dry Needling          mins self-pay  Traction          mins 81573  Re-Eval                               mins  84955  Group Therapy           ___ mins 87805    Timed Treatment:   45   mins   Total Treatment:      55   mins    Nisha Mar PT  Physical Therapist  [place] PT license #: [id number]

## 2023-10-23 ENCOUNTER — TREATMENT (OUTPATIENT)
Dept: PHYSICAL THERAPY | Facility: CLINIC | Age: 58
End: 2023-10-23
Payer: COMMERCIAL

## 2023-10-23 DIAGNOSIS — M54.9 CHRONIC BACK PAIN, UNSPECIFIED BACK LOCATION, UNSPECIFIED BACK PAIN LATERALITY: Primary | ICD-10-CM

## 2023-10-23 DIAGNOSIS — M25.552 LEFT HIP PAIN: ICD-10-CM

## 2023-10-23 DIAGNOSIS — G89.29 CHRONIC BACK PAIN, UNSPECIFIED BACK LOCATION, UNSPECIFIED BACK PAIN LATERALITY: Primary | ICD-10-CM

## 2023-10-23 DIAGNOSIS — R29.898 WEAKNESS OF BOTH LOWER EXTREMITIES: ICD-10-CM

## 2023-10-23 PROCEDURE — 97112 NEUROMUSCULAR REEDUCATION: CPT | Performed by: PHYSICAL THERAPIST

## 2023-10-23 PROCEDURE — 97530 THERAPEUTIC ACTIVITIES: CPT | Performed by: PHYSICAL THERAPIST

## 2023-10-23 NOTE — PROGRESS NOTES
Physical Therapy Daily Treatment Note    [facility] [place]  [address], [address]  [place] [address]    Visit # 3        Patient: Yi Hurtado   : 1965  Referring practitioner: Thao Vega MD  Date of Initial Evaluation:  Type: THERAPY  Noted: 10/16/2023  Today's Date: 10/23/2023           ICD-10-CM ICD-9-CM   1. Chronic back pain, unspecified back location, unspecified back pain laterality  M54.9 724.5    G89.29 338.29   2. Weakness of both lower extremities  R29.898 729.89   3. Left hip pain  M25.552 719.45       Subjective  Yi Hurtado reports:   No significant changes from status at last PT visit through today.  No LE symptoms noted at onset of today's session.     Objective   See Exercise, Manual, and Modality Logs for complete treatment - any intervention not performed this date has been marked as deferred.     Pt Education:  HEP review  Exercise rationale/ pain free exercise performance  Anatomy and structure of affected musculature  Posture/Postural awareness  Sleeping positions with pillows  Alternate exercise positions  Verbal/Tactile cues to ensure correct exercise performance/technique    Assessment/Plan  Tolerated continued progression of therapeutic exercise/therapeutic activity well today, no increased pain reported during or after session.  Notes some weakness (B) LE but occasional mild pain in (L) LE.    Would continue to benefit from skilled PT progressing with strength and functional WB/activity of LEs.        Progress per Plan of Care and Progress strengthening /stabilization /functional activity           Timed:         Manual Therapy:         mins  31260     Therapeutic Exercise:     15    mins  95509     Neuromuscular Harlan:    10    mins  07948    Therapeutic Activity:      12    mins  53842     Gait Training:           mins  72571     Ultrasound:          mins  43011    Ionto                                   mins  31403  Self Care                             mins  52231    Un-Timed:  Electrical Stimulation:         mins 48903 ( )  Traction          mins 95056    Timed Treatment:   37   mins   Total Treatment:     37   mins    ALEX Perales License #[id number]  Physical Therapist Assistant

## 2023-10-25 DIAGNOSIS — Z00.00 HEALTH MAINTENANCE EXAMINATION: ICD-10-CM

## 2023-10-25 DIAGNOSIS — E55.9 VITAMIN D DEFICIENCY: Primary | ICD-10-CM

## 2023-10-26 ENCOUNTER — TREATMENT (OUTPATIENT)
Dept: PHYSICAL THERAPY | Facility: CLINIC | Age: 58
End: 2023-10-26
Payer: COMMERCIAL

## 2023-10-26 DIAGNOSIS — G89.29 CHRONIC BACK PAIN, UNSPECIFIED BACK LOCATION, UNSPECIFIED BACK PAIN LATERALITY: Primary | ICD-10-CM

## 2023-10-26 DIAGNOSIS — M25.552 LEFT HIP PAIN: ICD-10-CM

## 2023-10-26 DIAGNOSIS — R29.898 WEAKNESS OF BOTH LOWER EXTREMITIES: ICD-10-CM

## 2023-10-26 DIAGNOSIS — M54.9 CHRONIC BACK PAIN, UNSPECIFIED BACK LOCATION, UNSPECIFIED BACK PAIN LATERALITY: Primary | ICD-10-CM

## 2023-10-26 NOTE — PROGRESS NOTES
Physical Therapy Daily Treatment Note  [facility] [place]  [address], [address]  [place] [id number]     Patient: Yi Hurtado  : 1965  Referring practitioner: Thao Vega MD  Today's Date: 10/26/2023    VISIT#: 4    Visit Diagnoses:    ICD-10-CM ICD-9-CM   1. Chronic back pain, unspecified back location, unspecified back pain laterality  M54.9 724.5    G89.29 338.29   2. Weakness of both lower extremities  R29.898 729.89   3. Left hip pain  M25.552 719.45       Subjective   Yi Hurtado reports: Patient reports that she is feeling okay today, she isn't having any pain in her back today but her hip and knee are bothering her. 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      Objective       See Exercise, Manual, and Modality Logs for complete treatment.     Patient Education: HEP review  Exercise rationale/ pain free exercise performance  Alternate exercise positions  Verbal/Tactile cues to ensure correct exercise performance/technique       Assessment/Plan  Patient had good tolerance to therapeutic exercises with no report of pain in the back and minimal discomfort in the left hip. Performed lateral and inferior hip mobilizations with a pre and post assessment of hip IR, less discomfort was reported after doing so. Continue to progress as tolerated.       Progress per Plan of Care and Progress strengthening /stabilization /functional activity          Timed:         Manual Therapy:    8     mins  64661;     Therapeutic Exercise:      37   mins  06658;     Neuromuscular Harlan:    -    mins  49989;    Therapeutic Activity:     -     mins  13596;     Gait Training:           mins  79335;     Ultrasound:          mins  35213;    Ionto:                                   mins  89126  Self Care:                       -     mins  18800    Un-Timed:  Electrical Stimulation:         mins  57498 ( );  Dry Needling          mins self-pay  Traction          mins  42278  Re-Eval                               mins  20937  Group Therapy           ___ mins 03858    Timed Treatment:   45   mins   Total Treatment:     52   mins    Nisha Mar PT  Physical Therapist  Melva ALFREDO license #: [id number]

## 2023-10-27 LAB
25(OH)D3+25(OH)D2 SERPL-MCNC: 29 NG/ML (ref 30–100)
ALBUMIN SERPL-MCNC: 4.5 G/DL (ref 3.5–5.2)
ALBUMIN/GLOB SERPL: 2.1 G/DL
ALP SERPL-CCNC: 72 U/L (ref 39–117)
ALT SERPL-CCNC: 15 U/L (ref 1–33)
AST SERPL-CCNC: 25 U/L (ref 1–32)
BASOPHILS # BLD AUTO: 0.11 10*3/MM3 (ref 0–0.2)
BASOPHILS NFR BLD AUTO: 1.7 % (ref 0–1.5)
BILIRUB SERPL-MCNC: 0.4 MG/DL (ref 0–1.2)
BUN SERPL-MCNC: 13 MG/DL (ref 8–23)
BUN/CREAT SERPL: 20.6 (ref 7–25)
CALCIUM SERPL-MCNC: 9.7 MG/DL (ref 8.6–10.5)
CHLORIDE SERPL-SCNC: 104 MMOL/L (ref 98–107)
CHOLEST SERPL-MCNC: 197 MG/DL (ref 0–200)
CO2 SERPL-SCNC: 29 MMOL/L (ref 22–29)
CREAT SERPL-MCNC: 0.63 MG/DL (ref 0.57–1)
EGFRCR SERPLBLD CKD-EPI 2021: 101.7 ML/MIN/1.73
EOSINOPHIL # BLD AUTO: 0.15 10*3/MM3 (ref 0–0.4)
EOSINOPHIL NFR BLD AUTO: 2.4 % (ref 0.3–6.2)
ERYTHROCYTE [DISTWIDTH] IN BLOOD BY AUTOMATED COUNT: 12.9 % (ref 12.3–15.4)
GLOBULIN SER CALC-MCNC: 2.1 GM/DL
GLUCOSE SERPL-MCNC: 93 MG/DL (ref 65–99)
HCT VFR BLD AUTO: 38.8 % (ref 34–46.6)
HDLC SERPL-MCNC: 74 MG/DL (ref 40–60)
HGB BLD-MCNC: 12.8 G/DL (ref 12–15.9)
IMM GRANULOCYTES # BLD AUTO: 0.02 10*3/MM3 (ref 0–0.05)
IMM GRANULOCYTES NFR BLD AUTO: 0.3 % (ref 0–0.5)
LDLC SERPL CALC-MCNC: 114 MG/DL (ref 0–100)
LYMPHOCYTES # BLD AUTO: 1.78 10*3/MM3 (ref 0.7–3.1)
LYMPHOCYTES NFR BLD AUTO: 28 % (ref 19.6–45.3)
MCH RBC QN AUTO: 29.6 PG (ref 26.6–33)
MCHC RBC AUTO-ENTMCNC: 33 G/DL (ref 31.5–35.7)
MCV RBC AUTO: 89.6 FL (ref 79–97)
MONOCYTES # BLD AUTO: 0.52 10*3/MM3 (ref 0.1–0.9)
MONOCYTES NFR BLD AUTO: 8.2 % (ref 5–12)
NEUTROPHILS # BLD AUTO: 3.78 10*3/MM3 (ref 1.7–7)
NEUTROPHILS NFR BLD AUTO: 59.4 % (ref 42.7–76)
NRBC BLD AUTO-RTO: 0 /100 WBC (ref 0–0.2)
PLATELET # BLD AUTO: 297 10*3/MM3 (ref 140–450)
POTASSIUM SERPL-SCNC: 4.8 MMOL/L (ref 3.5–5.2)
PROT SERPL-MCNC: 6.6 G/DL (ref 6–8.5)
RBC # BLD AUTO: 4.33 10*6/MM3 (ref 3.77–5.28)
SODIUM SERPL-SCNC: 141 MMOL/L (ref 136–145)
TRIGL SERPL-MCNC: 46 MG/DL (ref 0–150)
TSH SERPL DL<=0.005 MIU/L-ACNC: 0.97 UIU/ML (ref 0.27–4.2)
UNABLE TO VOID: NORMAL
VLDLC SERPL CALC-MCNC: 9 MG/DL (ref 5–40)
WBC # BLD AUTO: 6.36 10*3/MM3 (ref 3.4–10.8)

## 2023-10-31 ENCOUNTER — TREATMENT (OUTPATIENT)
Dept: PHYSICAL THERAPY | Facility: CLINIC | Age: 58
End: 2023-10-31
Payer: COMMERCIAL

## 2023-10-31 DIAGNOSIS — M25.552 LEFT HIP PAIN: ICD-10-CM

## 2023-10-31 DIAGNOSIS — M54.9 CHRONIC BACK PAIN, UNSPECIFIED BACK LOCATION, UNSPECIFIED BACK PAIN LATERALITY: Primary | ICD-10-CM

## 2023-10-31 DIAGNOSIS — G89.29 CHRONIC BACK PAIN, UNSPECIFIED BACK LOCATION, UNSPECIFIED BACK PAIN LATERALITY: Primary | ICD-10-CM

## 2023-10-31 DIAGNOSIS — R29.898 WEAKNESS OF BOTH LOWER EXTREMITIES: ICD-10-CM

## 2023-10-31 NOTE — PROGRESS NOTES
Physical Therapy Daily Treatment Note    [facility] [place]  [address], [address]  [place] [id number]    Visit # 5        Patient: Yi Hurtado   : 1965  Referring practitioner: Thao Vega MD  Date of Initial Evaluation:  Type: THERAPY  Noted: 10/16/2023  Today's Date: 10/31/2023           ICD-10-CM ICD-9-CM   1. Chronic back pain, unspecified back location, unspecified back pain laterality  M54.9 724.5    G89.29 338.29   2. Weakness of both lower extremities  R29.898 729.89   3. Left hip pain  M25.552 719.45       Subjective  Yi Hurtado reports:   I was feeling pretty good up until this morning.  I think the cold spell bothered my hip and back.     Objective   See Exercise, Manual, and Modality Logs for complete treatment - any intervention not performed this date has been marked as deferred.     Pt Education:  HEP review  Exercise rationale/ pain free exercise performance  Anatomy and structure of affected musculature  Posture/Postural awareness  Lumbar support  Sleeping positions with pillows  Alternate exercise positions  Verbal/Tactile cues to ensure correct exercise performance/technique    Assessment/Plan  Tolerated continued progression of therapeutic exercise/therapeutic activity well today, no increased pain reported during or after exercises.  We continued with emphasis on abdominal bracing and control with awareness of contraction and movement during.  Would continue to benefit from skilled PT progressing with core stabilization, functional ROM of lumbar spine and hips, and strengthening of affected area.         Progress per Plan of Care and Progress strengthening /stabilization /functional activity           Timed:         Manual Therapy:         mins  20204     Therapeutic Exercise:     25    mins  37242     Neuromuscular Harlan:    15    mins  88460    Therapeutic Activity:      10    mins  25509     Gait Training:           mins  32419      Ultrasound:          mins  99239    Ionto                                   mins  66303  Self Care                            mins  89523    Un-Timed:  Electrical Stimulation:         mins 30675 ( )  Traction          mins 89428    Timed Treatment:   50   mins   Total Treatment:     50   mins    ALEX Perales License #[id number]  Physical Therapist Assistant

## 2023-11-02 ENCOUNTER — TREATMENT (OUTPATIENT)
Dept: PHYSICAL THERAPY | Facility: CLINIC | Age: 58
End: 2023-11-02
Payer: COMMERCIAL

## 2023-11-02 DIAGNOSIS — M25.552 LEFT HIP PAIN: ICD-10-CM

## 2023-11-02 DIAGNOSIS — G89.29 CHRONIC BACK PAIN, UNSPECIFIED BACK LOCATION, UNSPECIFIED BACK PAIN LATERALITY: Primary | ICD-10-CM

## 2023-11-02 DIAGNOSIS — M54.9 CHRONIC BACK PAIN, UNSPECIFIED BACK LOCATION, UNSPECIFIED BACK PAIN LATERALITY: Primary | ICD-10-CM

## 2023-11-02 DIAGNOSIS — R29.898 WEAKNESS OF BOTH LOWER EXTREMITIES: ICD-10-CM

## 2023-11-02 PROCEDURE — 97112 NEUROMUSCULAR REEDUCATION: CPT | Performed by: PHYSICAL THERAPIST

## 2023-11-02 PROCEDURE — 97014 ELECTRIC STIMULATION THERAPY: CPT | Performed by: PHYSICAL THERAPIST

## 2023-11-02 PROCEDURE — 97530 THERAPEUTIC ACTIVITIES: CPT | Performed by: PHYSICAL THERAPIST

## 2023-11-02 PROCEDURE — 97110 THERAPEUTIC EXERCISES: CPT | Performed by: PHYSICAL THERAPIST

## 2023-11-02 NOTE — PROGRESS NOTES
Physical Therapy Daily Treatment Note    [facility] [place]  [address]  [place] [address]    Visit # 6        Patient: Yi Hurtado   : 1965  Referring practitioner: Thao Vega MD  Date of Initial Evaluation:  Type: THERAPY  Noted: 10/16/2023  Today's Date: 2023           ICD-10-CM ICD-9-CM   1. Chronic back pain, unspecified back location, unspecified back pain laterality  M54.9 724.5    G89.29 338.29   2. Weakness of both lower extremities  R29.898 729.89   3. Left hip pain  M25.552 719.45       Subjective  Yi Hurtado reports:   I felt good after our last session.  I had more pain last night after going to the grocery store, I came home and did some of the exercises and it felt better.  I am still feeling some pain across my lower back and into my (L) hip today, its about 3/10.  I took some Advil before coming in today.     Objective   See Exercise, Manual, and Modality Logs for complete treatment - any intervention not performed this date has been marked as deferred.     Pt Education:  HEP review  Exercise rationale/ pain free exercise performance  Anatomy and structure of affected musculature  Posture/Postural awareness  Lumbar support  Sleeping positions with pillows  Alternate exercise positions  Verbal/Tactile cues to ensure correct exercise performance/technique    Trial application of IFC to address persistent pain across back and into (B) hips.     Assessment & Plan       Assessment  Assessment details:   Tolerated continued gentle progression of therapeutic exercise/therapeutic activity well today, no increased pain reported during session.  Pt reports IFC helpful in reducing overall symptoms.      We discussed the positive benefits of using exercise/HEP to address discomfort with ADLs or after pt has been on her feet for a lengthy period of time.      Would continue to benefit from skilled PT progressing with core stabilization,  functional ROM of lumbar spine and hips, and strengthening of affected area.       Goals  Plan Goals: SHORT TERM GOALS: Time for Goal Achievement: 4 weeks  1.  Patient to be compliant with HEP and progression of HEP to demonstrate patient's understanding of importance of movement and maintaining strength even after discharge.                             2.  Pain level < 4/10 at worst with mentioned activities to improve function  3.  Increased thoracic, lumbar and SIJ mobility to allow for increased lumbar AROM with less pain to improve patient's ability to complete work tasks.   4.  Increased lumbar AROM to by 25% in all planes to allow for increased ease with sit-stand transfers and functional activities     LONG TERM GOALS: Time for Goal Achievement: 8 weeks  1.  Pt. to score < 20 % on Back Index and an improvement from a 56 to a 66 on the LEFS to show subjective improvement with daily activities.  2.  Pain level < 2/10 with all listed activities to return to normal.  3.  Lumbar and hip AROM to WFL to allow for return to household & recreational activities w/o increased symptoms to improve her safety with movements in her work environment.   4.  (B) LE strength to 5/5 to allow for pushing, pulling and activities to occur without pain (driving, sitting, household  & Job requirements) to improve safety within her work environment.             Progress per Plan of Care and Progress strengthening /stabilization /functional activity           Timed:         Manual Therapy:         mins  95007     Therapeutic Exercise:     16    mins  00758     Neuromuscular Harlan:    15    mins  97844    Therapeutic Activity:      10    mins  21281     Gait Training:           mins  24756     Ultrasound:          mins  19024    Ionto                                   mins  36030  Self Care                            mins  83613    Un-Timed:  Electrical Stimulation:    10     mins 36863 ( )  Traction          mins 81282    Timed  Treatment:   41   mins   Total Treatment:     51   mins    ALEX Perales License #[id number]  Physical Therapist Assistant

## 2023-11-03 ENCOUNTER — OFFICE VISIT (OUTPATIENT)
Dept: INTERNAL MEDICINE | Facility: CLINIC | Age: 58
End: 2023-11-03
Payer: COMMERCIAL

## 2023-11-03 VITALS
HEART RATE: 69 BPM | SYSTOLIC BLOOD PRESSURE: 126 MMHG | BODY MASS INDEX: 26.66 KG/M2 | OXYGEN SATURATION: 98 % | HEIGHT: 69 IN | DIASTOLIC BLOOD PRESSURE: 88 MMHG | WEIGHT: 180 LBS

## 2023-11-03 DIAGNOSIS — Z00.00 WELL ADULT EXAM: Primary | ICD-10-CM

## 2023-11-03 PROBLEM — Z12.11 SCREENING FOR MALIGNANT NEOPLASM OF COLON: Status: RESOLVED | Noted: 2023-08-17 | Resolved: 2023-11-03

## 2023-11-03 PROCEDURE — 99396 PREV VISIT EST AGE 40-64: CPT | Performed by: INTERNAL MEDICINE

## 2023-11-04 NOTE — PROGRESS NOTES
Subjective     Yi Hurtado is a 58 y.o. female who presents for a complete physical exam.      History of Present Illness     The following data was reviewed by: Thao Vega MD on 11/03/2023:  Common labs          10/27/2023    08:17   Common Labs   Glucose 93    BUN 13    Creatinine 0.63    Sodium 141    Potassium 4.8    Chloride 104    Calcium 9.7    Total Protein 6.6    Albumin 4.5    Total Bilirubin 0.4    Alkaline Phosphatase 72    AST (SGOT) 25    ALT (SGPT) 15    WBC 6.36    Hemoglobin 12.8    Hematocrit 38.8    Platelets 297    Total Cholesterol 197    Triglycerides 46    HDL Cholesterol 74    LDL Cholesterol  114      Labs overall look good.       Review of Systems   Constitutional: Negative.    HENT: Negative.     Eyes: Negative.    Respiratory: Negative.     Cardiovascular: Negative.    Gastrointestinal: Negative.    Endocrine: Negative.    Genitourinary: Negative.    Musculoskeletal: Negative.    Skin: Negative.    Allergic/Immunologic: Negative.    Neurological: Negative.    Hematological: Negative.    Psychiatric/Behavioral: Negative.         The following portions of the patient's history were reviewed and updated as appropriate: allergies, current medications, past family history, past medical history, past social history, past surgical history and problem list.  Health maintenance tab was reviewed and updated with the patient.       Patient Active Problem List    Diagnosis Date Noted    Vitamin D deficiency 03/27/2018    Mild intermittent asthma without complication 02/06/2017    Light tobacco smoker <10 cigarettes per day 02/06/2017    Allergic rhinitis 01/29/2016       Past Medical History:   Diagnosis Date    Allergic     sulpha, cipro    Asthma     Blood tests for routine general physical examination     Dysphagia 06/10/2013    Dr. Leana Greer    Earache     Eustachian tube dysfunction     Injury of back     lumbar bulging discs, unsure level    Irritable bowel syndrome     Lung nodule  10/28/2022    Myelopathy 1984    Dr. Ross Fernandez and Dr. Moises Orozco    Seizures     Simple goiter     Spinal stenosis     Urinary tract infection        Past Surgical History:   Procedure Laterality Date    APPENDECTOMY N/A 1969    BREAST BIOPSY      CATARACT EXTRACTION, BILATERAL Bilateral 3/2017, 4/2017    ENDOSCOPY AND COLONOSCOPY N/A 06/10/2013    Mild suggestion of a ringed esophagus, biopsied to r/o eosinophilic esophagitis-Dr. Leana Greer    MAMMO FINE NEEDLE ASPIRATION UNILATERAL Right 2013    pathology negative    PILONIDAL CYSTECTOMY N/A     VAGINAL HYSTERECTOMY SALPINGO OOPHORECTOMY WITH ANTERIOR AND POSTERIOR VAGINAL REPAIR N/A 04/27/2011    Total vaginal hysterectomy, external Andrews's culdoplasty and fulguration of implant of culk-de-sac endometriosis-Dr. Gildardo Howard       Family History   Problem Relation Age of Onset    Dementia Mother     Hypothyroidism Mother     Hypertension Mother     Diabetes type II Mother     Stroke Mother     Thyroid cancer Mother     Alcohol abuse Father     Other Father         acute heart attack    Heart disease Father     ADD / ADHD Son     Breast cancer Neg Hx        Social History     Socioeconomic History    Marital status:      Spouse name: Angel Luis    Number of children: 2   Tobacco Use    Smoking status: Every Day     Packs/day: 0.10     Years: 39.00     Additional pack years: 0.00     Total pack years: 3.90     Types: Cigarettes     Start date: 1/6/1979    Smokeless tobacco: Never   Vaping Use    Vaping Use: Never used   Substance and Sexual Activity    Alcohol use: Yes     Alcohol/week: 1.0 standard drink of alcohol     Types: 1 Standard drinks or equivalent per week    Sexual activity: Defer       Current Outpatient Medications on File Prior to Visit   Medication Sig Dispense Refill    albuterol sulfate  (90 Base) MCG/ACT inhaler INHALE 2 PUFFS EVERY 4 HOURS AS NEEDED FOR WHEEZING OR SHORTNESS OF AIR 18 g 1    ALLERGY SERUM INJECTION 2  "injections per week      ciclopirox (PENLAC) 8 % solution Apply  topically to the appropriate area as directed Every Night. 6 mL 0    conjugated estrogens (Premarin) 0.625 MG/GM vaginal cream Apply pea sized amount three three weekly. 30 g 1    fexofenadine (ALLEGRA) 180 MG tablet Take 1 tablet by mouth 2 (Two) Times a Day.      Flaxseed, Linseed, (FLAX SEEDS PO) Take 1 tablet by mouth Daily.      Fluticasone Furoate-Vilanterol (BREO ELLIPTA IN) Inhale.      montelukast (SINGULAIR) 10 MG tablet Take 1 tablet by mouth Every Night.      Multiple Vitamin (MULTI VITAMIN PO) Take 1 tablet by mouth Daily.      Omega-3 Fatty Acids (FISH OIL PO) Take 1 tablet by mouth daily.      Triamcinolone Acetonide (NASACORT AQ NA) 1 spray into each nostril Daily As Needed.       No current facility-administered medications on file prior to visit.       Allergies   Allergen Reactions    Ciprofloxacin Swelling    Sulfa Antibiotics Mental Status Change       Immunization History   Administered Date(s) Administered    COVID-19 (PFIZER) BIVALENT 12+YRS 11/19/2022    COVID-19 (PFIZER) Purple Cap Monovalent 02/22/2021, 03/15/2021, 11/13/2021    Covid-19 (Pfizer) Gray Cap Monovalent 05/08/2022    Pneumococcal Polysaccharide (PPSV23) 07/22/2020    TD Preservative Free (Tenivac) 08/04/2020    Tdap 05/01/2008       Objective     /88   Pulse 69   Ht 174 cm (68.5\")   Wt 81.6 kg (180 lb)   SpO2 98%   BMI 26.97 kg/m²     Physical Exam  Constitutional:       Appearance: She is well-developed.   HENT:      Head: Normocephalic and atraumatic.      Right Ear: Hearing, tympanic membrane and external ear normal.      Left Ear: Hearing, tympanic membrane and external ear normal.      Nose: Nose normal.   Neck:      Thyroid: No thyromegaly.   Cardiovascular:      Rate and Rhythm: Normal rate and regular rhythm.      Heart sounds: Normal heart sounds. No murmur heard.  Pulmonary:      Effort: Pulmonary effort is normal.      Breath sounds: Normal " breath sounds.   Abdominal:      General: There is no distension.      Palpations: Abdomen is soft.      Tenderness: There is no abdominal tenderness.   Musculoskeletal:      Cervical back: Neck supple.   Lymphadenopathy:      Cervical: No cervical adenopathy.   Skin:     General: Skin is warm and dry.   Neurological:      Mental Status: She is alert and oriented to person, place, and time.   Psychiatric:         Speech: Speech normal.         Behavior: Behavior normal.         Thought Content: Thought content normal.         Judgment: Judgment normal.         Assessment & Plan   Diagnoses and all orders for this visit:    1. Well adult exam (Primary)        Discussion    Patient presents today for a CPE.      Patient follows a healthy diet.   Patient follows an adequate exercise regimen. Mammogram is up to date.   Patient has been referred for colon cancer screening.   Pap smears are performed by the patient's gynecologist.   Discussed importance of preventative care including vaccinations, age appropriate cancer screening, routine lab work, healthy diet, and active lifestyle.  I have recommended that the patient get the following immunizations:  COVID-19.      Health Maintenance   Topic Date Due    ZOSTER VACCINE (1 of 2) Never done    Pneumococcal Vaccine 0-64 (2 - PCV) 07/22/2021    COLORECTAL CANCER SCREENING  06/10/2023    COVID-19 Vaccine (6 - 2023-24 season) 09/01/2023    ANNUAL PHYSICAL  10/28/2023    BMI FOLLOWUP  10/10/2024    MAMMOGRAM  04/15/2025    DXA SCAN  11/05/2027    TDAP/TD VACCINES (3 - Td or Tdap) 08/04/2030    HEPATITIS C SCREENING  Completed    INFLUENZA VACCINE  Discontinued            Future Appointments   Date Time Provider Department Center   11/7/2023 11:00 AM Savanahdell, Jason, PTA [facility]   11/10/2023 11:00 AM Savanahdell, Jason, PTA [facility]   11/14/2023 11:00 AM Nisha Mar, PT [facility]   11/17/2023  5:45 PM [facility] (3T)  [facility]   11/8/2024  3:30 PM  [facility]   11/15/2024  3:30 PM Thao Vega MD [facility]

## 2023-11-07 ENCOUNTER — TREATMENT (OUTPATIENT)
Dept: PHYSICAL THERAPY | Facility: CLINIC | Age: 58
End: 2023-11-07
Payer: COMMERCIAL

## 2023-11-07 DIAGNOSIS — R29.898 WEAKNESS OF BOTH LOWER EXTREMITIES: ICD-10-CM

## 2023-11-07 DIAGNOSIS — M54.9 CHRONIC BACK PAIN, UNSPECIFIED BACK LOCATION, UNSPECIFIED BACK PAIN LATERALITY: Primary | ICD-10-CM

## 2023-11-07 DIAGNOSIS — M25.552 LEFT HIP PAIN: ICD-10-CM

## 2023-11-07 DIAGNOSIS — G89.29 CHRONIC BACK PAIN, UNSPECIFIED BACK LOCATION, UNSPECIFIED BACK PAIN LATERALITY: Primary | ICD-10-CM

## 2023-11-07 PROCEDURE — 97110 THERAPEUTIC EXERCISES: CPT | Performed by: PHYSICAL THERAPIST

## 2023-11-07 PROCEDURE — 97530 THERAPEUTIC ACTIVITIES: CPT | Performed by: PHYSICAL THERAPIST

## 2023-11-07 PROCEDURE — 97112 NEUROMUSCULAR REEDUCATION: CPT | Performed by: PHYSICAL THERAPIST

## 2023-11-07 NOTE — PROGRESS NOTES
Physical Therapy Daily Treatment Note    [facility] [place]  [address], [address]  [place] [id number]    Visit # 7        Patient: Yi Hurtado   : 1965  Referring practitioner: Thao Vega MD  Date of Initial Evaluation:  Type: THERAPY  Noted: 10/16/2023  Today's Date: 2023           ICD-10-CM ICD-9-CM   1. Chronic back pain, unspecified back location, unspecified back pain laterality  M54.9 724.5    G89.29 338.29   2. Weakness of both lower extremities  R29.898 729.89   3. Left hip pain  M25.552 719.45       Subjective  Yi Hurtado reports:   My pain has ranged from 6/10 at worst to 0/10 at best over the past week.  I walked a total of 6 miles this past , I was sore but I felt good overall.       Objective   See Exercise, Manual, and Modality Logs for complete treatment - any intervention not performed this date has been marked as deferred.     Pt Education:  HEP review  Exercise rationale/ pain free exercise performance  Anatomy and structure of affected musculature  Alternate exercise positions  Verbal/Tactile cues to ensure correct exercise performance/technique    Held IFC as pt reports 0/10 pain at conclusion of today's session.      Assessment & Plan       Assessment  Assessment details: Tolerated continued progression of therapeutic exercise/therapeutic activity well today, no increased pain reported during session.  Added t-band resisted clamshell to HEP.  Pt notes some difficulty getting up from table with log-roll, difficult to push up due to tricep weakness from older injury.  Added wall push ups to program to address and help with transfer ability.   Reviewed goals today.   Would continue to benefit from skilled PT progressing with core stabilization, functional ROM of lumbar spine and hips, and strengthening of affected area.       Goals  Plan Goals: SHORT TERM GOALS: Time for Goal Achievement: 4 weeks  1.  Patient to be compliant  with HEP and progression of HEP to demonstrate patient's understanding of importance of movement and maintaining strength even after discharge - PROGRESSING                             2.  Pain level < 4/10 at worst with mentioned activities to improve function - PROGRESSING  3.  Increased thoracic, lumbar and SIJ mobility to allow for increased lumbar AROM with less pain to improve patient's ability to complete work tasks.   4.  Increased lumbar AROM to by 25% in all planes to allow for increased ease with sit-stand transfers and functional activities     LONG TERM GOALS: Time for Goal Achievement: 8 weeks  1.  Pt. to score < 20 % on Back Index and an improvement from a 56 to a 66 on the LEFS to show subjective improvement with daily activities.  2.  Pain level < 2/10 with all listed activities to return to normal.  3.  Lumbar and hip AROM to WFL to allow for return to household & recreational activities w/o increased symptoms to improve her safety with movements in her work environment.   4.  (B) LE strength to 5/5 to allow for pushing, pulling and activities to occur without pain (driving, sitting, household  & Job requirements) to improve safety within her work environment.             Progress per Plan of Care and Progress strengthening /stabilization /functional activity           Timed:         Manual Therapy:         mins  42968     Therapeutic Exercise:     25    mins  15363     Neuromuscular Harlan:    10    mins  53228    Therapeutic Activity:      15    mins  81152     Gait Training:           mins  49747     Ultrasound:          mins  28358    Ionto                                   mins  61304  Self Care                            mins  57807    Un-Timed:  Electrical Stimulation:         mins 01761 ( )  Traction          mins 14533    Timed Treatment:   50   mins   Total Treatment:     50   mins    ALEX Perales License #[id number]  Physical Therapist Assistant

## 2023-11-10 ENCOUNTER — TREATMENT (OUTPATIENT)
Dept: PHYSICAL THERAPY | Facility: CLINIC | Age: 58
End: 2023-11-10
Payer: COMMERCIAL

## 2023-11-10 DIAGNOSIS — R29.898 WEAKNESS OF BOTH LOWER EXTREMITIES: ICD-10-CM

## 2023-11-10 DIAGNOSIS — M25.552 LEFT HIP PAIN: ICD-10-CM

## 2023-11-10 DIAGNOSIS — M54.9 CHRONIC BACK PAIN, UNSPECIFIED BACK LOCATION, UNSPECIFIED BACK PAIN LATERALITY: Primary | ICD-10-CM

## 2023-11-10 DIAGNOSIS — G89.29 CHRONIC BACK PAIN, UNSPECIFIED BACK LOCATION, UNSPECIFIED BACK PAIN LATERALITY: Primary | ICD-10-CM

## 2023-11-10 PROCEDURE — 97110 THERAPEUTIC EXERCISES: CPT | Performed by: PHYSICAL THERAPIST

## 2023-11-10 PROCEDURE — 97112 NEUROMUSCULAR REEDUCATION: CPT | Performed by: PHYSICAL THERAPIST

## 2023-11-10 PROCEDURE — 97530 THERAPEUTIC ACTIVITIES: CPT | Performed by: PHYSICAL THERAPIST

## 2023-11-10 NOTE — PROGRESS NOTES
Physical Therapy Daily Treatment Note    [facility] Physical Therapy [place]  [address], [address]  [place] [address]    Visit # 8        Patient: Yi Hurtado   : 1965  Referring practitioner: Thao Vega MD  Date of Initial Evaluation:  Type: THERAPY  Noted: 10/16/2023  Today's Date: 11/10/2023           ICD-10-CM ICD-9-CM   1. Chronic back pain, unspecified back location, unspecified back pain laterality  M54.9 724.5    G89.29 338.29   2. Weakness of both lower extremities  R29.898 729.89   3. Left hip pain  M25.552 719.45       Subjective  Yi Hurtado reports:   I have been feeling pretty good over the past few days.  The exercises at home are going well.     Objective   See Exercise, Manual, and Modality Logs for complete treatment - any intervention not performed this date has been marked as deferred.     Pt Education:  HEP review  Exercise rationale/ pain free exercise performance  Anatomy and structure of affected musculature  Alternate exercise positions  Verbal/Tactile cues to ensure correct exercise performance/technique      Assessment & Plan       Assessment  Assessment details: Tolerated continued progression of therapeutic exercise/therapeutic activity well today, did note some LBP/tightness with attempted prone hip extension, held today.   Added t-band resisted clamshell to HEP.  Pt continues to note some difficulty getting up from table with log-roll, difficult to push up due to tricep weakness from older injury.  Reviewed wall push ups to program to address and help with transfer ability.   Reviewed goals today.   Would continue to benefit from skilled PT progressing with core stabilization, functional ROM of lumbar spine and hips, and strengthening of affected area.       Goals  Plan Goals: SHORT TERM GOALS: Time for Goal Achievement: 4 weeks  1.  Patient to be compliant with HEP and progression of HEP to demonstrate patient's understanding of importance  of movement and maintaining strength even after discharge - PROGRESSING                             2.  Pain level < 4/10 at worst with mentioned activities to improve function - PROGRESSING  3.  Increased thoracic, lumbar and SIJ mobility to allow for increased lumbar AROM with less pain to improve patient's ability to complete work tasks.   4.  Increased lumbar AROM to by 25% in all planes to allow for increased ease with sit-stand transfers and functional activities     LONG TERM GOALS: Time for Goal Achievement: 8 weeks  1.  Pt. to score < 20 % on Back Index and an improvement from a 56 to a 66 on the LEFS to show subjective improvement with daily activities.  2.  Pain level < 2/10 with all listed activities to return to normal.  3.  Lumbar and hip AROM to WFL to allow for return to household & recreational activities w/o increased symptoms to improve her safety with movements in her work environment.   4.  (B) LE strength to 5/5 to allow for pushing, pulling and activities to occur without pain (driving, sitting, household  & Job requirements) to improve safety within her work environment.             Progress per Plan of Care and Progress strengthening /stabilization /functional activity           Timed:         Manual Therapy:         mins  90806     Therapeutic Exercise:     20    mins  62755     Neuromuscular Harlan:    10    mins  02583    Therapeutic Activity:      12    mins  09346     Gait Training:           mins  74383     Ultrasound:          mins  61222    Ionto                                   mins  89067  Self Care                            mins  57457    Un-Timed:  Electrical Stimulation:         mins 76499 ( )  Traction          mins 92721    Timed Treatment:   42   mins   Total Treatment:     42   mins    ALEX Perales License #[id number]  Physical Therapist Assistant

## 2023-11-13 ENCOUNTER — ANESTHESIA EVENT (OUTPATIENT)
Dept: GASTROENTEROLOGY | Facility: HOSPITAL | Age: 58
End: 2023-11-13
Payer: COMMERCIAL

## 2023-11-13 ENCOUNTER — ANESTHESIA (OUTPATIENT)
Dept: GASTROENTEROLOGY | Facility: HOSPITAL | Age: 58
End: 2023-11-13
Payer: COMMERCIAL

## 2023-11-13 ENCOUNTER — HOSPITAL ENCOUNTER (OUTPATIENT)
Facility: HOSPITAL | Age: 58
Setting detail: HOSPITAL OUTPATIENT SURGERY
Discharge: HOME | End: 2023-11-13
Attending: SURGERY | Admitting: SURGERY
Payer: COMMERCIAL

## 2023-11-13 VITALS
RESPIRATION RATE: 20 BRPM | OXYGEN SATURATION: 99 % | WEIGHT: 175 LBS | SYSTOLIC BLOOD PRESSURE: 107 MMHG | HEART RATE: 59 BPM | BODY MASS INDEX: 26.52 KG/M2 | HEIGHT: 68 IN | DIASTOLIC BLOOD PRESSURE: 95 MMHG

## 2023-11-13 DIAGNOSIS — Z12.11 SCREENING FOR MALIGNANT NEOPLASM OF COLON: ICD-10-CM

## 2023-11-13 PROCEDURE — 25010000002 PHENYLEPHRINE 10 MG/ML SOLUTION: Performed by: NURSE ANESTHETIST, CERTIFIED REGISTERED

## 2023-11-13 PROCEDURE — 45381 COLONOSCOPY SUBMUCOUS NJX: CPT | Performed by: SURGERY

## 2023-11-13 PROCEDURE — 25010000002 GLUCAGON (RDNA) PER 1 MG: Performed by: SURGERY

## 2023-11-13 PROCEDURE — 88305 TISSUE EXAM BY PATHOLOGIST: CPT | Performed by: SURGERY

## 2023-11-13 PROCEDURE — 25810000003 LACTATED RINGERS PER 1000 ML: Performed by: SURGERY

## 2023-11-13 PROCEDURE — 45380 COLONOSCOPY AND BIOPSY: CPT | Performed by: SURGERY

## 2023-11-13 PROCEDURE — 25010000002 PROPOFOL 10 MG/ML EMULSION: Performed by: NURSE ANESTHETIST, CERTIFIED REGISTERED

## 2023-11-13 PROCEDURE — 45385 COLONOSCOPY W/LESION REMOVAL: CPT | Performed by: SURGERY

## 2023-11-13 DEVICE — WORKING LENGTH 235CM, WORKING CHANNEL 2.8MM
Type: IMPLANTABLE DEVICE | Site: SIGMOID COLON | Status: FUNCTIONAL
Brand: RESOLUTION 360 CLIP

## 2023-11-13 DEVICE — CLIP
Type: IMPLANTABLE DEVICE | Site: SIGMOID COLON | Status: FUNCTIONAL
Brand: RESOLUTION 360™ ULTRA CLIP

## 2023-11-13 RX ORDER — LIDOCAINE HYDROCHLORIDE 20 MG/ML
INJECTION, SOLUTION INFILTRATION; PERINEURAL AS NEEDED
Status: DISCONTINUED | OUTPATIENT
Start: 2023-11-13 | End: 2023-11-13 | Stop reason: SURG

## 2023-11-13 RX ORDER — SODIUM CHLORIDE, SODIUM LACTATE, POTASSIUM CHLORIDE, CALCIUM CHLORIDE 600; 310; 30; 20 MG/100ML; MG/100ML; MG/100ML; MG/100ML
1000 INJECTION, SOLUTION INTRAVENOUS CONTINUOUS
Status: DISCONTINUED | OUTPATIENT
Start: 2023-11-13 | End: 2023-11-13 | Stop reason: HOSPADM

## 2023-11-13 RX ORDER — PHENYLEPHRINE HYDROCHLORIDE 10 MG/ML
INJECTION INTRAVENOUS AS NEEDED
Status: DISCONTINUED | OUTPATIENT
Start: 2023-11-13 | End: 2023-11-13 | Stop reason: SURG

## 2023-11-13 RX ORDER — PROPOFOL 10 MG/ML
VIAL (ML) INTRAVENOUS AS NEEDED
Status: DISCONTINUED | OUTPATIENT
Start: 2023-11-13 | End: 2023-11-13 | Stop reason: SURG

## 2023-11-13 RX ORDER — IBUPROFEN 600 MG/1
TABLET ORAL AS NEEDED
Status: DISCONTINUED | OUTPATIENT
Start: 2023-11-13 | End: 2023-11-13 | Stop reason: HOSPADM

## 2023-11-13 RX ADMIN — PROPOFOL 140 MCG/KG/MIN: 10 INJECTION, EMULSION INTRAVENOUS at 08:34

## 2023-11-13 RX ADMIN — SODIUM CHLORIDE, POTASSIUM CHLORIDE, SODIUM LACTATE AND CALCIUM CHLORIDE 1000 ML: 600; 310; 30; 20 INJECTION, SOLUTION INTRAVENOUS at 07:46

## 2023-11-13 RX ADMIN — PROPOFOL 50 MG: 10 INJECTION, EMULSION INTRAVENOUS at 08:32

## 2023-11-13 RX ADMIN — PHENYLEPHRINE HYDROCHLORIDE 100 MCG: 10 INJECTION INTRAVENOUS at 09:00

## 2023-11-13 RX ADMIN — PHENYLEPHRINE HYDROCHLORIDE 100 MCG: 10 INJECTION INTRAVENOUS at 09:07

## 2023-11-13 RX ADMIN — PHENYLEPHRINE HYDROCHLORIDE 100 MCG: 10 INJECTION INTRAVENOUS at 09:11

## 2023-11-13 RX ADMIN — LIDOCAINE HYDROCHLORIDE 60 MG: 20 INJECTION, SOLUTION INFILTRATION; PERINEURAL at 08:32

## 2023-11-13 NOTE — H&P
Cc: Endoscopy Visit    HPI: 58 y.o. female here for screening with no prior polyps or family history of colon cancer.    Past Medical History:   Diagnosis Date    Allergic     sulpha, cipro    Asthma     Blood tests for routine general physical examination     Dysphagia 06/10/2013    Dr. Leana Greer    Earache     Eustachian tube dysfunction     Injury of back     lumbar bulging discs, unsure level    Irritable bowel syndrome     Lung nodule 10/28/2022    Myelopathy 1984    Dr. Ross Fernandez and Dr. Moises Orozco    Seizures     Simple goiter     Spinal stenosis     Urinary tract infection        Past Surgical History:   Procedure Laterality Date    APPENDECTOMY N/A 1969    BREAST BIOPSY      CATARACT EXTRACTION, BILATERAL Bilateral 3/2017, 4/2017    ENDOSCOPY AND COLONOSCOPY N/A 06/10/2013    Mild suggestion of a ringed esophagus, biopsied to r/o eosinophilic esophagitis-Dr. Leana Greer    MAMMO FINE NEEDLE ASPIRATION UNILATERAL Right 2013    pathology negative    PILONIDAL CYSTECTOMY N/A     VAGINAL HYSTERECTOMY SALPINGO OOPHORECTOMY WITH ANTERIOR AND POSTERIOR VAGINAL REPAIR N/A 04/27/2011    Total vaginal hysterectomy, external Andrews's culdoplasty and fulguration of implant of culk-de-sac endometriosis-Dr. Gildardo Howard       is allergic to ciprofloxacin and sulfa antibiotics.       Medication List        ASK your doctor about these medications      albuterol sulfate  (90 Base) MCG/ACT inhaler  Commonly known as: PROVENTIL HFA;VENTOLIN HFA;PROAIR HFA  INHALE 2 PUFFS EVERY 4 HOURS AS NEEDED FOR WHEEZING OR SHORTNESS OF AIR     ALLERGY SERUM INJECTION     BREO ELLIPTA IN     ciclopirox 8 % solution  Commonly known as: PENLAC  Apply  topically to the appropriate area as directed Every Night.     fexofenadine 180 MG tablet  Commonly known as: ALLEGRA     FISH OIL PO     FLAX SEEDS PO     montelukast 10 MG tablet  Commonly known as: SINGULAIR     multivitamin tablet tablet  Commonly known as: THERAGRAN      NASACORT AQ NA     Premarin 0.625 MG/GM vaginal cream  Generic drug: Estrogens Conjugated  Apply pea sized amount three three weekly.              Family History   Problem Relation Age of Onset    Dementia Mother     Hypothyroidism Mother     Hypertension Mother     Diabetes type II Mother     Stroke Mother     Thyroid cancer Mother     Alcohol abuse Father     Other Father         acute heart attack    Heart disease Father     ADD / ADHD Son     Breast cancer Neg Hx     Malig Hyperthermia Neg Hx        Social History     Socioeconomic History    Marital status:      Spouse name: Angel Luis    Number of children: 2   Tobacco Use    Smoking status: Every Day     Packs/day: 0.10     Years: 39.00     Additional pack years: 0.00     Total pack years: 3.90     Types: Cigarettes     Start date: 1/6/1979    Smokeless tobacco: Never   Vaping Use    Vaping Use: Never used   Substance and Sexual Activity    Alcohol use: Yes     Alcohol/week: 1.0 standard drink of alcohol     Types: 1 Standard drinks or equivalent per week    Sexual activity: Defer       Vitals:    11/13/23 0738   BP: 133/70   Pulse: 60   Resp: 22   SpO2: 99%       Body mass index is 26.61 kg/m².    Physical Exam    General: No acute distress  Lungs: No labored breathing, Pulse oximetry on room air is 99%.  Heart/EKG: RRR  Abdomen: no complaints of pain  Mental:  Awake, alert, and oriented    Imp:     Screening, average risk     Plan:  C jovanni Greer MD  08:33 EST

## 2023-11-13 NOTE — DISCHARGE INSTRUCTIONS
For the next 24 hours patient needs to be with a responsible adult.    For 24 hours DO NOT drive, operate machinery, appliances, drink alcohol, make important decisions or sign legal documents.    Start with a light or bland diet if you are feeling sick to your stomach otherwise advance to regular diet as tolerated.    Follow recommendations on procedure report if provided by your doctor.    Call Dr Greer [phone] For the next 24 hours patient needs to be with a responsible adult.    For 24 hours DO NOT drive, operate machinery, appliances, drink alcohol, make important decisions or sign legal documents.    Start with a light or bland diet if you are feeling sick to your stomach otherwise advance to regular diet as tolerated.    Follow recommendations on procedure report if provided by your doctor.        Problems may include but not limited to: large amounts of bleeding, trouble breathing, repeated vomiting, severe unrelieved pain, fever or chills.    For the next 24 hours patient needs to be with a responsible adult.    For 24 hours DO NOT drive, operate machinery, appliances, drink alcohol, make important decisions or sign legal documents.    Start with a light or bland diet if you are feeling sick to your stomach otherwise advance to regular diet as tolerated.    Follow recommendations on procedure report if provided by your doctor.    Problems may include but not limited to: large amounts of bleeding, trouble breathing, repeated vomiting, severe unrelieved pain, fever or chills.

## 2023-11-13 NOTE — ANESTHESIA POSTPROCEDURE EVALUATION
Patient: Yi Hurtado    Procedure Summary       Date: 11/13/23 Room / Location:  [facility]    Anesthesia Start: 0829 Anesthesia Stop: 0914    Procedure: COLONOSCOPY to cecum with cold biopsy polypectomy, hot snare polypectomy, 1.5 cc tattoo ink in recto-sigmoid colon with resolution clip placement x2 Diagnosis:       Screening for malignant neoplasm of colon      (Screening for malignant neoplasm of colon [Z12.11])    Surgeons: Leana Greer MD Provider: Jama Golden MD    Anesthesia Type: MAC ASA Status: 2            Anesthesia Type: MAC    Vitals  Vitals Value Taken Time   BP 96/47 11/13/23 0913   Temp     Pulse 55 11/13/23 0913   Resp 20 11/13/23 0913   SpO2 100 % 11/13/23 0913           Post Anesthesia Care and Evaluation    Patient location during evaluation: PACU  Patient participation: complete - patient participated  Level of consciousness: awake and alert  Pain management: adequate    Airway patency: patent  Anesthetic complications: No anesthetic complications    Cardiovascular status: acceptable  Respiratory status: acceptable  Hydration status: acceptable    Comments: --------------------            11/13/23 0913     --------------------   BP:       96/47      Pulse:      55       Resp:       20       SpO2:      100%     --------------------

## 2023-11-13 NOTE — ANESTHESIA PREPROCEDURE EVALUATION
Anesthesia Evaluation     Patient summary reviewed and Nursing notes reviewed                Airway   Mallampati: II  TM distance: >3 FB  Neck ROM: full  Dental      Pulmonary - negative pulmonary ROS   (+) a smoker Current, COPD,  Cardiovascular - negative cardio ROS    ECG reviewed  Rhythm: regular  Rate: normal        Neuro/Psych- negative ROS  GI/Hepatic/Renal/Endo - negative ROS   (+) thyroid problem hypothyroidism    Musculoskeletal (-) negative ROS    Abdominal    Substance History - negative use  (+) alcohol use     OB/GYN negative ob/gyn ROS         Other                      Anesthesia Plan    ASA 2     MAC     intravenous induction     Anesthetic plan, risks, benefits, and alternatives have been provided, discussed and informed consent has been obtained with: patient.    Plan discussed with CRNA.    CODE STATUS:

## 2023-11-13 NOTE — OP NOTE
Colonoscopy Procedure Note  Yi Hurtado  1965  Date of Procedure: 11/13/23    Pre-operative Diagnosis:    Screening, average risk    Post-operative Diagnosis:  Descending colon flat, vague, 8 mm polyp.  Removed via cold biopsy forceps  Rectosigmoid pedunculated polyp, 1.2 cm, 1 cm base, removed via ink lift, snare cautery and clipping X 2.  (Base of polyp with ink)  Tortuous colon    Procedure: Colonoscopy with polypectomy and ink        Recommendations:   Colonoscopy in 6-12 months, or flex sig.  The office will call within the next  3-10 days with a final recommendation.  Keep a copy of the photographs of the procedure given to you today for possible need for reference in the future.      Surgeon: Zoey    Anesthetic: MAC per Jama Golden MD    Scope Withdrawal Time:  19 minutes  26 seconds    Procedure Details     MAC anesthesia was induced.  The 180 Colonoscopy was inserted blindly into the rectum and advanced to the cecum, with difficulty due to looping with the sigmoid volvulizing with need for pressure, and lift, without turning.    Cecum was identified by the appendiceal orifice and the ileocecal valve and photographed for documentation.      Prep quality was very good.  A careful inspection was made as the scope was withdrawn, including a retroflexed view of the rectum; there was no suggestion of presence of angiodysplasias, or colitis, but there were the polyps without diverticula, with noted interventions. Settings on the cautery were 1 and 11 with tissue retrieved via suction to the end of the scope, being too large to come thru the scope.     Retroflexion in the rectum revealed no abnormalities.      Leana Greer MD  11/13/23

## 2023-11-14 ENCOUNTER — TELEPHONE (OUTPATIENT)
Dept: SURGERY | Facility: CLINIC | Age: 58
End: 2023-11-14
Payer: COMMERCIAL

## 2023-11-14 ENCOUNTER — TREATMENT (OUTPATIENT)
Dept: PHYSICAL THERAPY | Facility: CLINIC | Age: 58
End: 2023-11-14
Payer: COMMERCIAL

## 2023-11-14 DIAGNOSIS — M25.552 LEFT HIP PAIN: ICD-10-CM

## 2023-11-14 DIAGNOSIS — R29.898 WEAKNESS OF BOTH LOWER EXTREMITIES: ICD-10-CM

## 2023-11-14 DIAGNOSIS — M54.9 CHRONIC BACK PAIN, UNSPECIFIED BACK LOCATION, UNSPECIFIED BACK PAIN LATERALITY: Primary | ICD-10-CM

## 2023-11-14 DIAGNOSIS — G89.29 CHRONIC BACK PAIN, UNSPECIFIED BACK LOCATION, UNSPECIFIED BACK PAIN LATERALITY: Primary | ICD-10-CM

## 2023-11-14 LAB
LAB AP CASE REPORT: NORMAL
PATH REPORT.FINAL DX SPEC: NORMAL
PATH REPORT.GROSS SPEC: NORMAL

## 2023-11-14 PROCEDURE — 97110 THERAPEUTIC EXERCISES: CPT

## 2023-11-14 PROCEDURE — 97112 NEUROMUSCULAR REEDUCATION: CPT

## 2023-11-14 NOTE — TELEPHONE ENCOUNTER
----- Message from Leana Greer MD sent at 11/14/2023 11:24 AM EST -----  Ragini (endoscopy liaison),    Please call patient tto inform them of these findings and recommendations and ensure that any pamphlets that were to be given to the patient at the hospital were received.  Ensure that a letter is sent to the patient, recall method entered into the computer and the HM (Health Maintenance) section updated as to recommended endoscopy follow up.    Thanks  Leana Abbott MD (Physician) · Surgery  Colonoscopy Procedure Note  Yimilton Hurtado  1965  Date of Procedure: 11/13/23     Pre-operative Diagnosis:    · Screening, average risk     Post-operative Diagnosis:  · Descending colon flat, vague, 8 mm polyp.  Removed via cold biopsy forceps;  TUBULAR ADENOMA  · Rectosigmoid pedunculated polyp, 1.2 cm, 1 cm base, removed via ink lift, snare cautery and clipping X 2.  (Base of polyp with ink);  TUBULOVILLOUS ADENOMA.  · Tortuous colon     Procedure: Colonoscopy with polypectomy and ink                                         Recommendations:   1. Colonoscopy in 6-12 months, or flex sig.  The office will call within the next  3-10 days with a final recommendation.;  C SCOPE IN 6 MONTHS AND WILL REQUIRE PREP OF 1 BOTTLE OF MAG CITRATE DAY PRIOR, LOW RESIDUE DIET FOR 4 DAYS PRIOR AND 2 ENEMAS AT HOME MORNING OF.  HAVE HER WRITE THAT DOWN AND KEEP IT.  2. Keep a copy of the photographs of the procedure given to you today for possible need for reference in the future.

## 2023-11-14 NOTE — PROGRESS NOTES
Ragini (endoscopy liaison),    Please call patient tto inform them of these findings and recommendations and ensure that any pamphlets that were to be given to the patient at the hospital were received.  Ensure that a letter is sent to the patient, recall method entered into the computer and the HM (Health Maintenance) section updated as to recommended endoscopy follow up.    Thanks  Leana Abbott MD (Physician) · Surgery  Colonoscopy Procedure Note  Yi Hurtado  1965  Date of Procedure: 11/13/23    Pre-operative Diagnosis:    · Screening, average risk    Post-operative Diagnosis:  · Descending colon flat, vague, 8 mm polyp.  Removed via cold biopsy forceps;  TUBULAR ADENOMA  · Rectosigmoid pedunculated polyp, 1.2 cm, 1 cm base, removed via ink lift, snare cautery and clipping X 2.  (Base of polyp with ink);  TUBULOVILLOUS ADENOMA.  · Tortuous colon    Procedure: Colonoscopy with polypectomy and ink     Recommendations:   1. Colonoscopy in 6-12 months, or flex sig.  The office will call within the next  3-10 days with a final recommendation.;  C SCOPE IN 6 MONTHS AND WILL REQUIRE PREP OF 1 BOTTLE OF MAG CITRATE DAY PRIOR, LOW RESIDUE DIET FOR 4 DAYS PRIOR AND 2 ENEMAS AT HOME MORNING OF.  HAVE HER WRITE THAT DOWN AND KEEP IT.  2. Keep a copy of the photographs of the procedure given to you today for possible need for reference in the future.

## 2023-11-14 NOTE — PROGRESS NOTES
Physical Therapy Daily Treatment Note  [facility] Physical Therapy [place]  [address], [address]  [place] [id number]     Patient: Yi Hurtado  : 1965  Referring practitioner: Taho Vega MD  Today's Date: 2023    VISIT#: 9    Visit Diagnoses:    ICD-10-CM ICD-9-CM   1. Chronic back pain, unspecified back location, unspecified back pain laterality  M54.9 724.5    G89.29 338.29   2. Weakness of both lower extremities  R29.898 729.89   3. Left hip pain  M25.552 719.45       Subjective   Yi Hurtado reports: Patient reports that she had a coloscopy yesterday so feels like that has her feeling a little off today. She is having some pain in the hip and left lower back.      Objective       See Exercise, Manual, and Modality Logs for complete treatment.     Patient Education: HEP review  Exercise rationale/ pain free exercise performance  Alternate exercise positions  Verbal/Tactile cues to ensure correct exercise performance/technique       Assessment/Plan  Patient demonstrated good tolerance to therapeutic exercises on this date. She shows good understanding of breathing and abdominal engagement with LE strengthening exercises for coordination. Lumbar PA glides were tender to palpate at L2-L5. Thoracic mobility was very limited upon PA glides. Continue to progress per PLOF as tolerated.       Progress per Plan of Care and Progress strengthening /stabilization /functional activity          Timed:         Manual Therapy:    -     mins  78460;     Therapeutic Exercise:    20     mins  88512;     Neuromuscular Harlan:    10    mins  13582;    Therapeutic Activity:     -     mins  29322;     Gait Training:           mins  31023;     Ultrasound:          mins  76701;    Ionto:                                   mins  87420  Self Care:                       -     mins  24203    Un-Timed:  Electrical Stimulation:         mins  33965 ( );  Dry Needling          mins  self-pay  Traction          mins 20219  Re-Eval                               mins  87906  Group Therapy           ___ mins 86030    Timed Treatment:   30   mins   Total Treatment:     57   mins    Nisha Mar PT  Physical Therapist  Melva ALFREDO license #: [id number]

## 2023-11-17 ENCOUNTER — HOSPITAL ENCOUNTER (OUTPATIENT)
Dept: MRI IMAGING | Facility: HOSPITAL | Age: 58
Discharge: HOME | End: 2023-11-17
Admitting: INTERNAL MEDICINE
Payer: COMMERCIAL

## 2023-11-17 ENCOUNTER — TREATMENT (OUTPATIENT)
Dept: PHYSICAL THERAPY | Facility: CLINIC | Age: 58
End: 2023-11-17
Payer: COMMERCIAL

## 2023-11-17 DIAGNOSIS — R29.898 WEAKNESS OF BOTH LOWER EXTREMITIES: ICD-10-CM

## 2023-11-17 DIAGNOSIS — M48.061 SPINAL STENOSIS OF LUMBAR REGION, UNSPECIFIED WHETHER NEUROGENIC CLAUDICATION PRESENT: ICD-10-CM

## 2023-11-17 DIAGNOSIS — M25.552 LEFT HIP PAIN: ICD-10-CM

## 2023-11-17 DIAGNOSIS — G89.29 CHRONIC BILATERAL LOW BACK PAIN, UNSPECIFIED WHETHER SCIATICA PRESENT: ICD-10-CM

## 2023-11-17 DIAGNOSIS — R29.898 RIGHT LEG WEAKNESS: ICD-10-CM

## 2023-11-17 DIAGNOSIS — R29.2 HYPERREFLEXIA: ICD-10-CM

## 2023-11-17 DIAGNOSIS — M54.50 CHRONIC BILATERAL LOW BACK PAIN, UNSPECIFIED WHETHER SCIATICA PRESENT: ICD-10-CM

## 2023-11-17 DIAGNOSIS — M54.9 CHRONIC BACK PAIN, UNSPECIFIED BACK LOCATION, UNSPECIFIED BACK PAIN LATERALITY: Primary | ICD-10-CM

## 2023-11-17 DIAGNOSIS — G89.29 CHRONIC BACK PAIN, UNSPECIFIED BACK LOCATION, UNSPECIFIED BACK PAIN LATERALITY: Primary | ICD-10-CM

## 2023-11-17 PROCEDURE — 72148 MRI LUMBAR SPINE W/O DYE: CPT

## 2023-11-17 NOTE — LETTER
Physical Therapy Progress Note (Eval- Today)  [facility] Physical Therapy [place]  [address], [address]  [place] [id number]     Patient: Yi Hurtado  : 1965  Referring practitioner: Thao Vega MD  Today's Date: 2023    VISIT#: 10    Visit Diagnoses:    ICD-10-CM ICD-9-CM   1. Chronic back pain, unspecified back location, unspecified back pain laterality  M54.9 724.5    G89.29 338.29   2. Weakness of both lower extremities  R29.898 729.89   3. Left hip pain  M25.552 719.45     Back index: 44-40%  LEFS:56-51    Subjective Evaluation    Pain  Current pain ratin  At best pain ratin  At worst pain ratin         Yi Hurtado reports: Patient reports that she is currently not having any back or hip pain. This comes and goes and depends on the day but the numbness and tingling she hasn't had in a while. She reports that she has an MRI later today. She states that she feels like she has seen a 50-60% improvement since day one. She would like to continue working on LE strength and functional activities such as squatting and getting on and off the floor.       Objective     Active Range of Motion   Left Hip   Flexion: 98 degrees   External rotation (90/90): 20 degrees (26 degrees)  Internal rotation (90/90): 25 degrees (28 degrees)     Right Hip   Flexion: 120 degrees   External rotation (90/90): 25 degrees (28 degrees)  Internal rotation (90/90): 30 degrees (40 degrees)    Left Hip   Planes of Motion   Flexion: 4- (decreased indurance) (4-)  Extension: 3+ (4)  Abduction: 4-  External rotation: 3+ (4)  Internal rotation: 4- (4)     Right Hip   Planes of Motion   Flexion: 4- (4-)  Extension: 3+ (4)  Abduction: 4-  External rotation: 3+ (4)  Internal rotation: 4- (4)          See Exercise, Manual, and Modality Logs for complete treatment.     Patient Education: HEP review  Exercise rationale/ pain free exercise performance  Alternate exercise positions  Verbal/Tactile  cues to ensure correct exercise performance/technique       Assessment/Plan  Patient has demonstrated some improvement so far in her plan of care. Her pain is better managed throughout the weeks and only occasionally has the pain that reaches a 7. On average she reports having a 2-3/10 pain and has been able to decrease her Advil intake. Subjectively she reports that she has seen about a 50-60% improvement but would like to continue to improve LE strength to better be able to lift her legs to get in and out of her car, squatting to get things off the floor, and getting down to scrub the floor. Outcome measures showed slight improvement in the back and slight regression with the LEFS. Patient demonstrates slight improvement in hip mobility and strength but would benefit from continued skilled physical therapy interventions.     Goals  Plan Goals: SHORT TERM GOALS: Time for Goal Achievement: 4 weeks  1.  Patient to be compliant with HEP and progression of HEP to demonstrate patient's understanding of importance of movement and maintaining strength even after discharge. (Progressing)                           2.  Pain level < 4/10 at worst with mentioned activities to improve function (Progressing)  3.  Increased thoracic, lumbar and SIJ mobility to allow for increased lumbar AROM with less pain to improve patient's ability to complete work tasks. (Progressing)  4.  Increased lumbar AROM to by 25% in all planes to allow for increased ease with sit-stand transfers and functional activities (Progressing)     LONG TERM GOALS: Time for Goal Achievement: 8 weeks  1.  Pt. to score < 20 % on Back Index and an improvement from a 56 to a 66 on the LEFS to show subjective improvement with daily activities. (Slight decrease)  2.  Pain level < 2/10 with all listed activities to return to normal. (Progressing)  3.  Lumbar and hip AROM to WFL to allow for return to household & recreational activities w/o increased symptoms to  improve her safety with movements in her work environment. (Progressing)  4.  (B) LE strength to 5/5 to allow for pushing, pulling and activities to occur without pain (driving, sitting, household  & Job requirements) to improve safety within her work environment. (Progressing)      Nisha Mar, PT  Physical Therapist  [place] PT license #: [id number]

## 2023-11-17 NOTE — PROGRESS NOTES
Physical Therapy Progress Note (Eval- Today)  [facility] [place]  [address], [address]  [place] [id number]     Patient: Yi Hurtado  : 1965  Referring practitioner: Thao Vega MD  Today's Date: 2023    VISIT#: 10    Visit Diagnoses:    ICD-10-CM ICD-9-CM   1. Chronic back pain, unspecified back location, unspecified back pain laterality  M54.9 724.5    G89.29 338.29   2. Weakness of both lower extremities  R29.898 729.89   3. Left hip pain  M25.552 719.45     Back index: 44-40%  LEFS:56-51    Subjective Evaluation    Pain  Current pain ratin  At best pain ratin  At worst pain ratin         Yi Hurtado reports: Patient reports that she is currently not having any back or hip pain. This comes and goes and depends on the day but the numbness and tingling she hasn't had in a while. She reports that she has an MRI later today. She states that she feels like she has seen a 50-60% improvement since day one. She would like to continue working on LE strength and functional activities such as squatting and getting on and off the floor.       Objective     Active Range of Motion   Left Hip   Flexion: 98 degrees   External rotation (90/90): 20 degrees (26 degrees)  Internal rotation (90/90): 25 degrees (28 degrees)     Right Hip   Flexion: 120 degrees   External rotation (90/90): 25 degrees (28 degrees)  Internal rotation (90/90): 30 degrees (40 degrees)    Left Hip   Planes of Motion   Flexion: 4- (decreased indurance) (4-)  Extension: 3+ (4)  Abduction: 4-  External rotation: 3+ (4)  Internal rotation: 4- (4)     Right Hip   Planes of Motion   Flexion: 4- (4-)  Extension: 3+ (4)  Abduction: 4-  External rotation: 3+ (4)  Internal rotation: 4- (4)          See Exercise, Manual, and Modality Logs for complete treatment.     Patient Education: HEP review  Exercise rationale/ pain free exercise performance  Alternate exercise positions  Verbal/Tactile  cues to ensure correct exercise performance/technique       Assessment/Plan  Patient has demonstrated some improvement so far in her plan of care. Her pain is better managed throughout the weeks and only occasionally has the pain that reaches a 7. On average she reports having a 2-3/10 pain and has been able to decrease her Advil intake. Subjectively she reports that she has seen about a 50-60% improvement but would like to continue to improve LE strength to better be able to lift her legs to get in and out of her car, squatting to get things off the floor, and getting down to scrub the floor. Outcome measures showed slight improvement in the back and slight regression with the LEFS. Patient demonstrates slight improvement in hip mobility and strength but would benefit from continued skilled physical therapy interventions.     Goals  Plan Goals: SHORT TERM GOALS: Time for Goal Achievement: 4 weeks  1.  Patient to be compliant with HEP and progression of HEP to demonstrate patient's understanding of importance of movement and maintaining strength even after discharge. (Progressing)                           2.  Pain level < 4/10 at worst with mentioned activities to improve function (Progressing)  3.  Increased thoracic, lumbar and SIJ mobility to allow for increased lumbar AROM with less pain to improve patient's ability to complete work tasks. (Progressing)  4.  Increased lumbar AROM to by 25% in all planes to allow for increased ease with sit-stand transfers and functional activities (Progressing)     LONG TERM GOALS: Time for Goal Achievement: 8 weeks  1.  Pt. to score < 20 % on Back Index and an improvement from a 56 to a 66 on the LEFS to show subjective improvement with daily activities. (Slight decrease)  2.  Pain level < 2/10 with all listed activities to return to normal. (Progressing)  3.  Lumbar and hip AROM to WFL to allow for return to household & recreational activities w/o increased symptoms to  improve her safety with movements in her work environment. (Progressing)  4.  (B) LE strength to 5/5 to allow for pushing, pulling and activities to occur without pain (driving, sitting, household  & Job requirements) to improve safety within her work environment. (Progressing)          Progress per Plan of Care and Progress strengthening /stabilization /functional activity          Timed:         Manual Therapy:    -     mins  52755;     Therapeutic Exercise:    24     mins  01860;     Neuromuscular Harlan:    -    mins  87987;    Therapeutic Activity:     23     mins  16637;     Gait Training:           mins  13219;     Ultrasound:          mins  03188;    Ionto:                                   mins  08972  Self Care:                       -     mins  03505    Un-Timed:  Electrical Stimulation:         mins  70418 ( );  Dry Needling          mins self-pay  Traction          mins 68149  Re-Eval                               mins  64111  Group Therapy           ___ mins 74168    Timed Treatment:   53   mins   Total Treatment:     53   mins    Nisha Mar PT  Physical Therapist  Melva ALFREDO license #: [id number]

## 2023-11-21 ENCOUNTER — TREATMENT (OUTPATIENT)
Dept: PHYSICAL THERAPY | Facility: CLINIC | Age: 58
End: 2023-11-21
Payer: COMMERCIAL

## 2023-11-21 DIAGNOSIS — G89.29 CHRONIC BACK PAIN, UNSPECIFIED BACK LOCATION, UNSPECIFIED BACK PAIN LATERALITY: Primary | ICD-10-CM

## 2023-11-21 DIAGNOSIS — R29.898 WEAKNESS OF BOTH LOWER EXTREMITIES: ICD-10-CM

## 2023-11-21 DIAGNOSIS — M25.552 LEFT HIP PAIN: ICD-10-CM

## 2023-11-21 DIAGNOSIS — M54.9 CHRONIC BACK PAIN, UNSPECIFIED BACK LOCATION, UNSPECIFIED BACK PAIN LATERALITY: Primary | ICD-10-CM

## 2023-11-21 NOTE — PROGRESS NOTES
Physical Therapy Daily Treatment Note    [facility] Therapy [place]  [address], [address]  [place] [address]    Visit # 11        Patient: Yi Hurtado   : 1965  Referring practitioner: Thao Vega MD  Date of Initial Evaluation:  Type: THERAPY  Noted: 10/16/2023  Today's Date: 2023           ICD-10-CM ICD-9-CM   1. Chronic back pain, unspecified back location, unspecified back pain laterality  M54.9 724.5    G89.29 338.29   2. Left hip pain  M25.552 719.45   3. Weakness of both lower extremities  R29.898 729.89       Subjective  Yi Hurtado reports:   I'm sore today but noting out of the ordinary.  I do feel like some movements are getting stronger, like getting up out of a chair.     Objective   See Exercise, Manual, and Modality Logs for complete treatment - any intervention not performed this date has been marked as deferred.     Pt Education:  HEP review  Exercise rationale/ pain free exercise performance  Posture/Postural awareness  Lumbar support/abd bracing  Alternate exercise positions  Verbal/Tactile cues to ensure correct exercise performance/technique      Assessment/Plan  Able to progress through entire TE program with focus on TrA bracing throughout to provide support for functional activities of bed and chair transfers, and bracing during ADLs while performing light house work.   Would continue to benefit from skilled PT progressing with functional movement training and general strength.        Progress per Plan of Care and Progress strengthening /stabilization /functional activity           Timed:         Manual Therapy:         mins  08712     Therapeutic Exercise:     25    mins  66734     Neuromuscular Harlan:    10    mins  96135    Therapeutic Activity:      16    mins  38587     Gait Training:           mins  33237     Ultrasound:          mins  99129    Ionto                                   mins  90623  Self Care                         8    mins  47355    Un-Timed:  Electrical Stimulation:         mins 04797 ( )  Traction          mins 41185    Timed Treatment:   59   mins   Total Treatment:     59   mins    ALEX Perales License #[id number]  Physical Therapist Assistant

## 2023-11-28 ENCOUNTER — TREATMENT (OUTPATIENT)
Dept: PHYSICAL THERAPY | Facility: CLINIC | Age: 58
End: 2023-11-28
Payer: COMMERCIAL

## 2023-11-28 DIAGNOSIS — M54.9 CHRONIC BACK PAIN, UNSPECIFIED BACK LOCATION, UNSPECIFIED BACK PAIN LATERALITY: Primary | ICD-10-CM

## 2023-11-28 DIAGNOSIS — R29.898 WEAKNESS OF BOTH LOWER EXTREMITIES: ICD-10-CM

## 2023-11-28 DIAGNOSIS — G89.29 CHRONIC BACK PAIN, UNSPECIFIED BACK LOCATION, UNSPECIFIED BACK PAIN LATERALITY: Primary | ICD-10-CM

## 2023-11-28 DIAGNOSIS — M25.552 LEFT HIP PAIN: ICD-10-CM

## 2023-11-28 PROCEDURE — 97110 THERAPEUTIC EXERCISES: CPT

## 2023-11-28 PROCEDURE — 97112 NEUROMUSCULAR REEDUCATION: CPT

## 2023-11-28 NOTE — PROGRESS NOTES
Patient: Sherri Breaux   : 2025 MRN: [id number]    SUBJECTIVE:  Chief Complaint   Patient presents with   • Follow-up   • Pneumonia       A 7 week old female is here for a follow up of pneumonia and oral thrush.    Patient mother has given consent to record this visit for documentation in their clinical record.    HISTORY OF PRESENT ILLNESS:     Historian: Accompanied by mother and caregiver who provided the history.    Pneumonia due to infectious organism, unspecified laterality, unspecified part of lung: Her spO2 is 95 % today. She is eating well. States that she is feeling better with Cefdinir.    Oral thrush: Currently, on nystatin. Has mild symptoms.    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 Use 1 mL by mouth 4 times daily until clear   • cefdinir (OMNICEF) 125 MG/5ML suspension Take 1 mL by mouth in the morning and 1 mL in the evening. Do all this for 10 days.     No current facility-administered medications for this visit.     ALLERGIES:  ALLERGIES:  No Known Allergies  PAST SURGICAL HISTORY:  History reviewed. No pertinent surgical history.  FAMILY HISTORY:  History reviewed. No pertinent family history.  SOCIAL HISTORY:  Social History     Tobacco Use   Smoking Status Not on file   Smokeless Tobacco Not on file     Social History     Substance and Sexual Activity   Alcohol Use None       REVIEW OF SYSTEMS:  HENT: As per HPI.  OBJECTIVE:  Vitals:    23 1035   SpO2: 90%   Weight: 3.756 kg (8 lb 4.5 oz)     There is no height or weight on file to calculate BMI.      PHYSICAL EXAM:  Constitutional: Alert, in no acute distress and current vital signs reviewed.   Head and  Physical Therapy Daily Treatment Note  [facility] [place]  [address], [address]  [place] [id number]     Patient: Yi Hurtado  : 1965  Referring practitioner: Thao Vega MD  Today's Date: 2023    VISIT#: 12    Visit Diagnoses:    ICD-10-CM ICD-9-CM   1. Chronic back pain, unspecified back location, unspecified back pain laterality  M54.9 724.5    G89.29 338.29   2. Left hip pain  M25.552 719.45   3. Weakness of both lower extremities  R29.898 729.89       Subjective   Yi Hurtado reports: Patient reports that she experiences more days that she is having no pain. She continues to have no numbness and tingling into her leg. She was able to walk 4.5 miles the other day and took tylenol before hand, she had no problem walking. Discussed with her doctor the hyper-reflexes and is going to try to see a neurologist.       Objective       See Exercise, Manual, and Modality Logs for complete treatment.     Patient Education: HEP review  Exercise rationale/ pain free exercise performance  Alternate exercise positions  Verbal/Tactile cues to ensure correct exercise performance/technique       Assessment/Plan  Patient had good tolerance to therapeutic exercises on this date with no report of back, hip, or knee pain. Verbal cues to continue to engage core musculature throughout and to avoid compensations with standing hip abduction. Continue to progress strength and stability in hips.       Progress per Plan of Care and Progress strengthening /stabilization /functional activity          Timed:         Manual Therapy:    -     mins  58040;     Therapeutic Exercise:    25     mins  38548;     Neuromuscular Harlan:    25    mins  26234;    Therapeutic Activity:     5     mins  47868;     Gait Training:           mins  38841;     Ultrasound:          mins  36966;    Ionto:                                   mins  49581  Self Care:                       -     mins   33342    Un-Timed:  Electrical Stimulation:         mins  76696 ( );  Dry Needling          mins self-pay  Traction          mins 86747  Re-Eval                               mins  96725  Group Therapy           ___ mins 24867    Timed Treatment:   55   mins   Total Treatment:     58   mins    Nisha Mar PT  Physical Therapist  [place] license #: [id number]   Face: Atraumatic and normocephalic.   Eyes: No discharge, no eyelid swelling and the sclerae were normal.   ENT: Oropharynx normal. Normal appearing outer ear. Tympanic membranes are bilaterally clear, normal appearing nose and normal lips. A few white patches on the sides of mouth bilaterally was noted.  Neck: Normal appearing neck and supple neck.   Pulmonary: Breath sounds clear to auscultation bilaterally, but no respiratory distress and normal respiratory rate and effort.   Cardiovascular: Normal rate, regular rhythm, normal S1, normal S2 and edema was not present in the lower extremities.   Abdomen: Soft and nontender.   Psychiatric: Alert and awake, interactive and mood/affect were appropriate.   Skin, Hair, Nails: Normal skin color and pigmentation.          ASSESSMENT AND PLAN:  This is a 7 week old female who presents with :  1. Pneumonia due to infectious organism, unspecified laterality, unspecified part of lung    2. Oral thrush        Orders Placed This Encounter   • nystatin (MYCOSTATIN) [id number] UNIT/ML suspension       Plan:    Pneumonia due to infectious organism, unspecified laterality, unspecified part of lung  (primary encounter diagnosis)  Improved.  Patient is to finish the course of Cefdinir.    Oral thrush  Prescribed nystatin (MYCOSTATIN) [id number] UNIT/ML. To take 1 mL by mouth 4 times daily.     Follow up in 3 weeks or as needed.    Refer to orders.  Medical compliance with plan discussed and risks of non-compliance reviewed.  Patient education completed on disease process, etiology & prognosis.  Proper usage and side effects of medications reviewed & discussed.  Patient understands and agrees with the plan.  Return to clinic as clinically indicated as discussed with patient who verbalized understanding of the plan and is in agreement with the plan.    Return in about 3 weeks (around 2023) for WCV.    I,  Dr. Tan Boo, have created a visit summary document based on the audio  recording between Dr. Derek Enriquez DO and this patient for the physician to review, edit as needed, and authenticate.  Creation Date: 2023     I have reviewed and edited the visit summary above and attest that it is accurate.    Derek Enriquez DO

## 2023-12-01 ENCOUNTER — TREATMENT (OUTPATIENT)
Dept: PHYSICAL THERAPY | Facility: CLINIC | Age: 58
End: 2023-12-01
Payer: COMMERCIAL

## 2023-12-01 DIAGNOSIS — M25.552 LEFT HIP PAIN: ICD-10-CM

## 2023-12-01 DIAGNOSIS — M54.9 CHRONIC BACK PAIN, UNSPECIFIED BACK LOCATION, UNSPECIFIED BACK PAIN LATERALITY: Primary | ICD-10-CM

## 2023-12-01 DIAGNOSIS — G89.29 CHRONIC BACK PAIN, UNSPECIFIED BACK LOCATION, UNSPECIFIED BACK PAIN LATERALITY: Primary | ICD-10-CM

## 2023-12-01 DIAGNOSIS — R29.898 WEAKNESS OF BOTH LOWER EXTREMITIES: ICD-10-CM

## 2023-12-01 PROCEDURE — 97110 THERAPEUTIC EXERCISES: CPT | Performed by: PHYSICAL THERAPIST

## 2023-12-01 PROCEDURE — 97530 THERAPEUTIC ACTIVITIES: CPT | Performed by: PHYSICAL THERAPIST

## 2023-12-01 PROCEDURE — 97112 NEUROMUSCULAR REEDUCATION: CPT | Performed by: PHYSICAL THERAPIST

## 2023-12-01 NOTE — PROGRESS NOTES
Physical Therapy Daily Treatment Note    [facility] Physical Therapy [place]  [address], [address]  [place] [address]    Visit # 13        Patient: Yi Hurtado   : 1965  Referring practitioner: Thao Vega MD  Date of Initial Evaluation:  Type: THERAPY  Noted: 10/16/2023  Today's Date: 2023           ICD-10-CM ICD-9-CM   1. Chronic back pain, unspecified back location, unspecified back pain laterality  M54.9 724.5    G89.29 338.29   2. Left hip pain  M25.552 719.45   3. Weakness of both lower extremities  R29.898 729.89       Subjective  Yi Hurtado reports:   Feeling good today, no specific areas of pain/soreness noted at onset of today's session.     Objective   See Exercise, Manual, and Modality Logs for complete treatment - any intervention not performed this date has been marked as deferred.     Pt Education:  HEP review  Exercise rationale/ pain free exercise performance  Posture/Postural awareness  Lumbar support/abd bracing  Alternate exercise positions  Verbal/Tactile cues to ensure correct exercise performance/technique    Assessment/Plan  Able to progress through entire TE program with focus on TrA bracing throughout to provide support for functional activities of bed and chair transfers, and bracing during ADLs while performing light house work.   Would continue to benefit from skilled PT progressing with functional movement training and general strength.        Progress per Plan of Care and Progress strengthening /stabilization /functional activity           Timed:         Manual Therapy:         mins  41594     Therapeutic Exercise:     26    mins  01617     Neuromuscular Harlan:    10    mins  97028    Therapeutic Activity:      10    mins  74384     Gait Training:           mins  09628     Ultrasound:          mins  46274    Ionto                                   mins  48644  Self Care                            mins  56517    Un-Timed:  Electrical  Stimulation:         mins 60304 ( )  Traction          mins 00587    Timed Treatment:   46   mins   Total Treatment:     46   mins    ALEX Perales License #[id number]  Physical Therapist Assistant

## 2023-12-08 ENCOUNTER — TREATMENT (OUTPATIENT)
Dept: PHYSICAL THERAPY | Facility: CLINIC | Age: 58
End: 2023-12-08
Payer: COMMERCIAL

## 2023-12-08 DIAGNOSIS — M54.9 CHRONIC BACK PAIN, UNSPECIFIED BACK LOCATION, UNSPECIFIED BACK PAIN LATERALITY: Primary | ICD-10-CM

## 2023-12-08 DIAGNOSIS — G89.29 CHRONIC BACK PAIN, UNSPECIFIED BACK LOCATION, UNSPECIFIED BACK PAIN LATERALITY: Primary | ICD-10-CM

## 2023-12-08 DIAGNOSIS — M25.552 LEFT HIP PAIN: ICD-10-CM

## 2023-12-08 DIAGNOSIS — R29.898 WEAKNESS OF BOTH LOWER EXTREMITIES: ICD-10-CM

## 2023-12-08 NOTE — PROGRESS NOTES
Physical Therapy Daily Treatment Note  [facility] Physical Therapy [place]  [address], [address]  [place] [address]     Patient: Yi Hurtado  : 1965  Referring practitioner: Thao Vega MD  Today's Date: 2023    VISIT#: 14    Visit Diagnoses:    ICD-10-CM ICD-9-CM   1. Chronic back pain, unspecified back location, unspecified back pain laterality  M54.9 724.5    G89.29 338.29   2. Weakness of both lower extremities  R29.898 729.89   3. Left hip pain  M25.552 719.45       Subjective   Yi Hurtado reports: Patient reports that she is not experiencing any lower back pain but does have some left hip discomfort and stiffness that she woke up with this morning.       Objective       See Exercise, Manual, and Modality Logs for complete treatment.     Patient Education: HEP review  Exercise rationale/ pain free exercise performance  Alternate exercise positions  Verbal/Tactile cues to ensure correct exercise performance/technique       Assessment/Plan  Patient had good tolerance to therapeutic exercises on this date with general fatigue reported throughout in her hips and LE muscles. Continue to progress as tolerated.       Progress per Plan of Care and Progress strengthening /stabilization /functional activity          Timed:         Manual Therapy:    -     mins  00588;     Therapeutic Exercise:    10     mins  18102;     Neuromuscular Harlan:    -    mins  01953;    Therapeutic Activity:     20     mins  06720;     Gait Training:           mins  03842;     Ultrasound:          mins  58558;    Ionto:                                   mins  71727  Self Care:                       -     mins  53104    Un-Timed:  Electrical Stimulation:         mins  75183 ( );  Dry Needling          mins self-pay  Traction          mins 56304  Re-Eval                               mins  75439  Group Therapy           ___ mins 30430    Timed Treatment:   30   mins   Total Treatment:      51   mins    Nisha Mar, PT  Physical Therapist  [place] [facility] license #: [id number]

## 2023-12-12 ENCOUNTER — TREATMENT (OUTPATIENT)
Dept: PHYSICAL THERAPY | Facility: CLINIC | Age: 58
End: 2023-12-12
Payer: COMMERCIAL

## 2023-12-12 DIAGNOSIS — M54.9 CHRONIC BACK PAIN, UNSPECIFIED BACK LOCATION, UNSPECIFIED BACK PAIN LATERALITY: Primary | ICD-10-CM

## 2023-12-12 DIAGNOSIS — R29.898 WEAKNESS OF BOTH LOWER EXTREMITIES: ICD-10-CM

## 2023-12-12 DIAGNOSIS — G89.29 CHRONIC BACK PAIN, UNSPECIFIED BACK LOCATION, UNSPECIFIED BACK PAIN LATERALITY: Primary | ICD-10-CM

## 2023-12-12 DIAGNOSIS — M25.552 LEFT HIP PAIN: ICD-10-CM

## 2023-12-12 NOTE — PROGRESS NOTES
Physical Therapy Daily Treatment Note  [facility] Physical Therapy [place]  [address], [address]  [place] [address]     Patient: Yi Hurtado  : 1965  Referring practitioner: Thao Vega MD  Today's Date: 2023    VISIT#: 15    Visit Diagnoses:    ICD-10-CM ICD-9-CM   1. Chronic back pain, unspecified back location, unspecified back pain laterality  M54.9 724.5    G89.29 338.29   2. Weakness of both lower extremities  R29.898 729.89   3. Left hip pain  M25.552 719.45       Subjective   Yi Hurtado reports: Patient reports that he hip was very tight on Saturday but it seems to be loser today. Patient reports that she was sore after the 3 way hip kick. My knee is bothering me the most today.       Objective       See Exercise, Manual, and Modality Logs for complete treatment.     Patient Education: HEP review  Exercise rationale/ pain free exercise performance  Alternate exercise positions  Verbal/Tactile cues to ensure correct exercise performance/technique       Assessment/Plan  Patient demonstrated good tolerance to therapeutic exercises and activities on this date. Reviewed clamshells and reverse clamshells to continue to strengthen hip musculature. Verbal cues were provided with STS for foot placement to engage proper muscles. Continue to focus on LE strength.      Progress per Plan of Care and Progress strengthening /stabilization /functional activity          Timed:         Manual Therapy:    -     mins  51160;     Therapeutic Exercise:    12     mins  88448;     Neuromuscular Harlan:    18    mins  35855;    Therapeutic Activity:     -     mins  92561;     Gait Training:           mins  90481;     Ultrasound:          mins  47781;    Ionto:                                   mins  57325  Self Care:                       -     mins  57713    Un-Timed:  Electrical Stimulation:         mins  71893 ( );  Dry Needling          mins self-pay  Traction          mins  48635  Re-Eval                               mins  31158  Group Therapy           ___ mins 13569    Timed Treatment:   30   mins   Total Treatment:      60  mins    Nisha Mar PT  Physical Therapist  [place] ALFREDO license #: [id number]

## 2023-12-15 ENCOUNTER — TREATMENT (OUTPATIENT)
Dept: PHYSICAL THERAPY | Facility: CLINIC | Age: 58
End: 2023-12-15
Payer: COMMERCIAL

## 2023-12-15 DIAGNOSIS — G89.29 CHRONIC BACK PAIN, UNSPECIFIED BACK LOCATION, UNSPECIFIED BACK PAIN LATERALITY: Primary | ICD-10-CM

## 2023-12-15 DIAGNOSIS — R29.898 WEAKNESS OF BOTH LOWER EXTREMITIES: ICD-10-CM

## 2023-12-15 DIAGNOSIS — M54.9 CHRONIC BACK PAIN, UNSPECIFIED BACK LOCATION, UNSPECIFIED BACK PAIN LATERALITY: Primary | ICD-10-CM

## 2023-12-15 DIAGNOSIS — M25.552 LEFT HIP PAIN: ICD-10-CM

## 2023-12-15 PROCEDURE — 97112 NEUROMUSCULAR REEDUCATION: CPT | Performed by: PHYSICAL THERAPIST

## 2023-12-15 PROCEDURE — 97530 THERAPEUTIC ACTIVITIES: CPT | Performed by: PHYSICAL THERAPIST

## 2023-12-15 PROCEDURE — 97110 THERAPEUTIC EXERCISES: CPT | Performed by: PHYSICAL THERAPIST

## 2023-12-15 NOTE — PROGRESS NOTES
Physical Therapy Daily Treatment Note    [facility] Physical Therapy [place]  [address], [address]  [place] [address]    Visit # 16        Patient: Yi Hurtado   : 1965  Referring practitioner: Thao Vega MD  Date of Initial Evaluation:  Type: THERAPY  Noted: 10/16/2023  Today's Date: 12/15/2023           ICD-10-CM ICD-9-CM   1. Chronic back pain, unspecified back location, unspecified back pain laterality  M54.9 724.5    G89.29 338.29   2. Weakness of both lower extremities  R29.898 729.89   3. Left hip pain  M25.552 719.45       Subjective  Yi Hurtado reports:   No significant changes from status at last PT visit through today.    Objective   See Exercise, Manual, and Modality Logs for complete treatment - any intervention not performed this date has been marked as deferred.     Pt Education:  HEP review  Exercise rationale/ pain free exercise performance  Alternate exercise positions  Verbal/Tactile cues to ensure correct exercise performance/technique    Assessment/Plan  Tolerated continued progression of therapeutic exercise/therapeutic activity well today, no increased pain reported during or after exercises.  Able to increase leg press resist to 60#/30#.    Would continue to benefit from skilled PT progressing with functional WB and strength.      Progress per Plan of Care and Progress strengthening /stabilization /functional activity        Timed:         Manual Therapy:         mins  99652     Therapeutic Exercise:     25    mins  52923     Neuromuscular Harlan:    14    mins  42860    Therapeutic Activity:      10    mins  36288     Gait Training:           mins  81253     Ultrasound:          mins  16345    Ionto                                   mins  94908  Self Care                            mins  99385    Un-Timed:  Electrical Stimulation:         mins 40097 ( )  Traction          mins 26211    Timed Treatment:   49   mins   Total Treatment:      49   mins    Jason Rogers, [facility] License #[id number]  Physical Therapist Assistant

## 2023-12-19 ENCOUNTER — TREATMENT (OUTPATIENT)
Dept: PHYSICAL THERAPY | Facility: CLINIC | Age: 58
End: 2023-12-19
Payer: COMMERCIAL

## 2023-12-19 DIAGNOSIS — M54.9 CHRONIC BACK PAIN, UNSPECIFIED BACK LOCATION, UNSPECIFIED BACK PAIN LATERALITY: Primary | ICD-10-CM

## 2023-12-19 DIAGNOSIS — G89.29 CHRONIC BACK PAIN, UNSPECIFIED BACK LOCATION, UNSPECIFIED BACK PAIN LATERALITY: Primary | ICD-10-CM

## 2023-12-19 DIAGNOSIS — R29.898 WEAKNESS OF BOTH LOWER EXTREMITIES: ICD-10-CM

## 2023-12-19 DIAGNOSIS — M25.552 LEFT HIP PAIN: ICD-10-CM

## 2023-12-19 NOTE — LETTER
Physical Therapy Progress Note (- Today)  [facility] [place]  [address], [address]  [place] [id number]     Patient: Yi Hurtado  : 1965  Referring practitioner: Thao Vega MD  Today's Date: 2023    VISIT#: 17    Visit Diagnoses:    ICD-10-CM ICD-9-CM   1. Chronic back pain, unspecified back location, unspecified back pain laterality  M54.9 724.5    G89.29 338.29   2. Weakness of both lower extremities  R29.898 729.89   3. Left hip pain  M25.552 719.45   LEFS: 51- 64      Subjective Evaluation    Pain  Current pain ratin  At worst pain ratin         Yi Hurtado reports: Patient reports that she is doing pretty good, her left knee is a little sore today but her back and hip are about a 1/10. She still feels like her hip gets a little stiff. She sees her doctor tomorrow. Patient reports feeling about 80% better from where she began and feels like she has the resources and tools to continue on her own.       Objective   Active Range of Motion   Left Hip   Flexion: 98 degrees   External rotation (90/90): (26 degrees) (30 degrees)  Internal rotation (90/90): (28 degrees) (35 degrees)     Right Hip   Flexion: 120 degrees   External rotation (90/90): (28 degrees)  Internal rotation (90/90):  (40 degrees)     Left Hip   Planes of Motion   Flexion:(4-)  Extension:  (4)  Abduction: 4-  External rotation: (4) (4+)  Internal rotation: (4) (4+)     Right Hip   Planes of Motion   Flexion: (4-)  Extension:  (4)  Abduction: 4-  External rotation: (4) (4+)  Internal rotation: (4) (4+)      Patient Education: HEP review  Exercise rationale/ pain free exercise performance  Alternate exercise positions  Verbal/Tactile cues to ensure correct exercise performance/technique     Discussed the importance of continuing a strengthening program when she is discharged from physical therapy to avoid any set backs.     Assessment/Plan  Patient has demonstrated good  improvement so far in her plan of care. Her pain is better managed throughout the weeks and only occasionally has the pain that reaches a 4. On average she reports having a 1-2/10 pain and has been able to decrease her Advil intake. Subjectively she reports that she has seen about a 80% improvement. She reports it is easier to lift her legs to get in and out of her car, squatting to get things off the floor, and getting down to scrub the floor. Outcome measures showed good improvement in the back and improvement with the LEFS. Patient has continued to demonstrate good improvement in hip mobility and strength but would benefit from continued skilled physical therapy interventions.      Goals  Plan Goals: SHORT TERM GOALS: Time for Goal Achievement: 4 weeks  1.  Patient to be compliant with HEP and progression of HEP to demonstrate patient's understanding of importance of movement and maintaining strength even after discharge. (Progressing)                           2.  Pain level < 4/10 at worst with mentioned activities to improve function (MET)  3.  Increased thoracic, lumbar and SIJ mobility to allow for increased lumbar AROM with less pain to improve patient's ability to complete work tasks. (Progressing)  4.  Increased lumbar AROM to by 25% in all planes to allow for increased ease with sit-stand transfers and functional activities (Progressing)     LONG TERM GOALS: Time for Goal Achievement: 8 weeks  1.  Pt. to score < 20 % on Back Index and an improvement from a 56 to a 66 on the LEFS to show subjective improvement with daily activities. (Slight decrease)  2.  Pain level < 2/10 with all listed activities to return to normal. (MET)  3.  Lumbar and hip AROM to WFL to allow for return to household & recreational activities w/o increased symptoms to improve her safety with movements in her work environment. (Progressing)  4.  (B) LE strength to 5/5 to allow for pushing, pulling and activities to occur without pain  (driving, sitting, household  & Job requirements) to improve safety within her work environment. (Progressing)       Anticipate DC next Visit      Nisha Mar, PT  Physical Therapist  [place] [facility] license #: [id number]

## 2023-12-19 NOTE — PROGRESS NOTES
Physical Therapy Progress Note (- Today)  [facility] [place]  [address], [address]  [place] [id number]     Patient: Yi Hurtado  : 1965  Referring practitioner: Thao Vega MD  Today's Date: 2023    VISIT#: 17    Visit Diagnoses:    ICD-10-CM ICD-9-CM   1. Chronic back pain, unspecified back location, unspecified back pain laterality  M54.9 724.5    G89.29 338.29   2. Weakness of both lower extremities  R29.898 729.89   3. Left hip pain  M25.552 719.45   LEFS: 51- 64      Subjective Evaluation    Pain  Current pain ratin  At worst pain ratin         Yi Hurtado reports: Patient reports that she is doing pretty good, her left knee is a little sore today but her back and hip are about a 1/10. She still feels like her hip gets a little stiff. She sees her doctor tomorrow. Patient reports feeling about 80% better from where she began and feels like she has the resources and tools to continue on her own.       Objective   Active Range of Motion   Left Hip   Flexion: 98 degrees   External rotation (90/90): (26 degrees) (30 degrees)  Internal rotation (90/90): (28 degrees) (35 degrees)     Right Hip   Flexion: 120 degrees   External rotation (90/90): (28 degrees)  Internal rotation (90/90):  (40 degrees)     Left Hip   Planes of Motion   Flexion:(4-)  Extension:  (4)  Abduction: 4-  External rotation: (4) (4+)  Internal rotation: (4) (4+)     Right Hip   Planes of Motion   Flexion: (4-)  Extension:  (4)  Abduction: 4-  External rotation: (4) (4+)  Internal rotation: (4) (4+)    See Exercise, Manual, and Modality Logs for complete treatment.     Patient Education: HEP review  Exercise rationale/ pain free exercise performance  Alternate exercise positions  Verbal/Tactile cues to ensure correct exercise performance/technique     Discussed the importance of continuing a strengthening program when she is discharged from physical therapy to avoid any  set backs.     Assessment/Plan  Patient has demonstrated good improvement so far in her plan of care. Her pain is better managed throughout the weeks and only occasionally has the pain that reaches a 4. On average she reports having a 1-2/10 pain and has been able to decrease her Advil intake. Subjectively she reports that she has seen about a 80% improvement. She reports it is easier to lift her legs to get in and out of her car, squatting to get things off the floor, and getting down to scrub the floor. Outcome measures showed good improvement in the back and improvement with the LEFS. Patient has continued to demonstrate good improvement in hip mobility and strength but would benefit from continued skilled physical therapy interventions.      Goals  Plan Goals: SHORT TERM GOALS: Time for Goal Achievement: 4 weeks  1.  Patient to be compliant with HEP and progression of HEP to demonstrate patient's understanding of importance of movement and maintaining strength even after discharge. (Progressing)                           2.  Pain level < 4/10 at worst with mentioned activities to improve function (MET)  3.  Increased thoracic, lumbar and SIJ mobility to allow for increased lumbar AROM with less pain to improve patient's ability to complete work tasks. (Progressing)  4.  Increased lumbar AROM to by 25% in all planes to allow for increased ease with sit-stand transfers and functional activities (Progressing)     LONG TERM GOALS: Time for Goal Achievement: 8 weeks  1.  Pt. to score < 20 % on Back Index and an improvement from a 56 to a 66 on the LEFS to show subjective improvement with daily activities. (Slight decrease)  2.  Pain level < 2/10 with all listed activities to return to normal. (MET)  3.  Lumbar and hip AROM to WFL to allow for return to household & recreational activities w/o increased symptoms to improve her safety with movements in her work environment. (Progressing)  4.  (B) LE strength to 5/5 to  allow for pushing, pulling and activities to occur without pain (driving, sitting, household  & Job requirements) to improve safety within her work environment. (Progressing)       Anticipate DC next Visit          Timed:         Manual Therapy:    -     mins  99300;     Therapeutic Exercise:    -     mins  34062;     Neuromuscular Harlan:    -    mins  95911;    Therapeutic Activity:     25     mins  00262;     Gait Training:           mins  41235;     Ultrasound:          mins  18973;    Ionto:                                   mins  75539  Self Care:                       -     mins  39035    Un-Timed:  Electrical Stimulation:         mins  82585 ( );  Dry Needling          mins self-pay  Traction          mins 38037  Re-Eval                               mins  02507  Group Therapy           ___ mins 74052    Timed Treatment:   25   mins   Total Treatment:     60   mins    Nisha Mar PT  Physical Therapist  [place] license #: [id number]

## 2023-12-20 ENCOUNTER — OFFICE VISIT (OUTPATIENT)
Dept: INTERNAL MEDICINE | Facility: CLINIC | Age: 58
End: 2023-12-20
Payer: COMMERCIAL

## 2023-12-20 VITALS
WEIGHT: 175 LBS | BODY MASS INDEX: 26.52 KG/M2 | HEART RATE: 65 BPM | DIASTOLIC BLOOD PRESSURE: 84 MMHG | HEIGHT: 68 IN | OXYGEN SATURATION: 95 % | SYSTOLIC BLOOD PRESSURE: 126 MMHG

## 2023-12-20 DIAGNOSIS — G89.29 CHRONIC LOW BACK PAIN, UNSPECIFIED BACK PAIN LATERALITY, UNSPECIFIED WHETHER SCIATICA PRESENT: Primary | ICD-10-CM

## 2023-12-20 DIAGNOSIS — M54.50 CHRONIC LOW BACK PAIN, UNSPECIFIED BACK PAIN LATERALITY, UNSPECIFIED WHETHER SCIATICA PRESENT: Primary | ICD-10-CM

## 2023-12-20 RX ORDER — CONJUGATED ESTROGENS 0.62 MG/G
CREAM VAGINAL
Qty: 30 G | Refills: 1 | Status: SHIPPED | OUTPATIENT
Start: 2023-12-20

## 2023-12-20 NOTE — PROGRESS NOTES
Subjective     Yi Hurtado is a 58 y.o. female who presents with   Chief Complaint   Patient presents with    Results       History of Present Illness     The following data was reviewed by: Thao Vega MD on 12/20/2023:    Data reviewed : Radiologic studies MRI L/S spine      F/u LBP and leg weakness.  Reviewed MRI.  She is doing well with therapy.      Review of Systems   Respiratory: Negative.     Cardiovascular: Negative.        The following portions of the patient's history were reviewed and updated as appropriate: allergies, current medications and problem list.    Patient Active Problem List    Diagnosis Date Noted    Vitamin D deficiency 03/27/2018    Mild intermittent asthma without complication 02/06/2017    Light tobacco smoker <10 cigarettes per day 02/06/2017    Allergic rhinitis 01/29/2016       Current Outpatient Medications on File Prior to Visit   Medication Sig Dispense Refill    albuterol sulfate  (90 Base) MCG/ACT inhaler INHALE 2 PUFFS EVERY 4 HOURS AS NEEDED FOR WHEEZING OR SHORTNESS OF AIR 18 g 1    ALLERGY SERUM INJECTION 2 injections per week      ciclopirox (PENLAC) 8 % solution Apply  topically to the appropriate area as directed Every Night. 6 mL 0    fexofenadine (ALLEGRA) 180 MG tablet Take 1 tablet by mouth 2 (Two) Times a Day.      Flaxseed, Linseed, (FLAX SEEDS PO) Take 1 tablet by mouth Daily.      Fluticasone Furoate-Vilanterol (BREO ELLIPTA IN) Inhale 1 puff Daily.      montelukast (SINGULAIR) 10 MG tablet Take 1 tablet by mouth Every Night.      Multiple Vitamin (MULTI VITAMIN PO) Take 1 tablet by mouth Daily.      Omega-3 Fatty Acids (FISH OIL PO) Take 1 tablet by mouth daily.      Triamcinolone Acetonide (NASACORT AQ NA) 1 spray into each nostril Daily As Needed.      [DISCONTINUED] conjugated estrogens (Premarin) 0.625 MG/GM vaginal cream Apply pea sized amount three three weekly. 30 g 1     No current facility-administered medications on file prior to visit.  "      Objective     /84   Pulse 65   Ht 172.7 cm (67.99\")   Wt 79.4 kg (175 lb)   SpO2 95%   BMI 26.61 kg/m²     Physical Exam  Constitutional:       Appearance: She is well-developed.   HENT:      Head: Normocephalic and atraumatic.   Pulmonary:      Effort: Pulmonary effort is normal.   Neurological:      Mental Status: She is alert and oriented to person, place, and time.   Psychiatric:         Behavior: Behavior normal.         Assessment & Plan   Diagnoses and all orders for this visit:    1. Chronic low back pain, unspecified back pain laterality, unspecified whether sciatica present (Primary)    Other orders  -     COVID-19 F23 (Pfizer) 12yrs+ (COMIRNATY)  -     Estrogens Conjugated (Premarin) 0.625 MG/GM vaginal cream; Apply pea sized amount three three weekly.  Dispense: 30 g; Refill: 1        Discussion    Patient presents in f/u of chronic low back pain.  MRI reveals mild canal and foraminal narrowing.  She is doing well with physical therapy.  She will continue.         Future Appointments   Date Time Provider Department Center   12/22/2023 10:30 AM Nisha Mar PT [facility]   12/26/2023 10:30 AM Nisha Mar PT [facility]   11/8/2024  3:30 PM [facility]   11/15/2024  3:30 PM Thao Vega MD [facility]         "

## 2023-12-22 ENCOUNTER — TREATMENT (OUTPATIENT)
Dept: PHYSICAL THERAPY | Facility: CLINIC | Age: 58
End: 2023-12-22
Payer: COMMERCIAL

## 2023-12-22 DIAGNOSIS — G89.29 CHRONIC BACK PAIN, UNSPECIFIED BACK LOCATION, UNSPECIFIED BACK PAIN LATERALITY: Primary | ICD-10-CM

## 2023-12-22 DIAGNOSIS — M54.9 CHRONIC BACK PAIN, UNSPECIFIED BACK LOCATION, UNSPECIFIED BACK PAIN LATERALITY: Primary | ICD-10-CM

## 2023-12-22 DIAGNOSIS — R29.898 WEAKNESS OF BOTH LOWER EXTREMITIES: ICD-10-CM

## 2023-12-22 DIAGNOSIS — M25.552 LEFT HIP PAIN: ICD-10-CM

## 2023-12-22 NOTE — PROGRESS NOTES
Physical Therapy Daily Treatment Note  [facility] [place]  [address], [address]  [place] [id number]     Patient: Yi Hurtado  : 1965  Referring practitioner: Thao Vega MD  Today's Date: 2023    VISIT#: 18    Visit Diagnoses:    ICD-10-CM ICD-9-CM   1. Chronic back pain, unspecified back location, unspecified back pain laterality  M54.9 724.5    G89.29 338.29   2. Weakness of both lower extremities  R29.898 729.89   3. Left hip pain  M25.552 719.45       Subjective   Yi Hurtado reports: Patient reports that she saw her PCP yesterday and she retested her reflexes and said they were back in normal range. She is ready for DC.       Objective       See Exercise, Manual, and Modality Logs for complete treatment.     Patient Education: HEP review  Exercise rationale/ pain free exercise performance  Alternate exercise positions  Verbal/Tactile cues to ensure correct exercise performance/technique       Assessment/Plan  Patient has demonstrated great progress with therapeutic exercises and interventions. Please see previous progress note for more details. She demonstrates improved functional strength and understanding of her underlying pathology. Answered any questions she had at the time and discussed contacting us if she has any further questions or concerns.       Other: DC          Timed:         Manual Therapy:    -     mins  67296;     Therapeutic Exercise:    -     mins  56829;     Neuromuscular Harlan:    -    mins  72872;    Therapeutic Activity:     30     mins  32658;     Gait Training:           mins  70671;     Ultrasound:          mins  20626;    Ionto:                                   mins  47911  Self Care:                       -     mins  91051    Un-Timed:  Electrical Stimulation:         mins  62324 ( );  Dry Needling          mins self-pay  Traction          mins 48243  Re-Eval                               mins  48665  Group  Therapy           ___ mins 28411    Timed Treatment:   30   mins   Total Treatment:     60   mins    Nisha Mar PT  Physical Therapist  [place] license #: [id number]

## 2023-12-26 ENCOUNTER — TELEPHONE (OUTPATIENT)
Dept: PHYSICAL THERAPY | Facility: CLINIC | Age: 58
End: 2023-12-26

## 2024-01-04 ENCOUNTER — TELEPHONE (OUTPATIENT)
Dept: INTERNAL MEDICINE | Facility: CLINIC | Age: 59
End: 2024-01-04
Payer: COMMERCIAL

## 2024-01-04 NOTE — TELEPHONE ENCOUNTER
Caller: Yi Hurtado    Relationship: Self    Best call back number: [phone]    What was the call regarding: PATIENT WOULD LIKE TO HAVE A PAP.    PLEASE CALL TO SCHEDULE.

## 2024-01-23 ENCOUNTER — OFFICE VISIT (OUTPATIENT)
Dept: INTERNAL MEDICINE | Facility: CLINIC | Age: 59
End: 2024-01-23
Payer: COMMERCIAL

## 2024-01-23 VITALS
SYSTOLIC BLOOD PRESSURE: 118 MMHG | HEART RATE: 76 BPM | HEIGHT: 68 IN | WEIGHT: 175 LBS | OXYGEN SATURATION: 98 % | DIASTOLIC BLOOD PRESSURE: 84 MMHG | BODY MASS INDEX: 26.52 KG/M2

## 2024-01-23 DIAGNOSIS — L98.9 SKIN LESION: Primary | ICD-10-CM

## 2024-01-23 PROCEDURE — 99213 OFFICE O/P EST LOW 20 MIN: CPT | Performed by: INTERNAL MEDICINE

## 2024-01-23 NOTE — PROGRESS NOTES
"Subjective     Yi Hurtado is a 59 y.o. female who presents with   Chief Complaint   Patient presents with    Sore       History of Present Illness     One week of skin lesion left inner leg.  It was a bump and she scratched it off.      Review of Systems   Constitutional:  Negative for fever.       The following portions of the patient's history were reviewed and updated as appropriate: allergies, current medications and problem list.    Patient Active Problem List    Diagnosis Date Noted    Vitamin D deficiency 03/27/2018    Mild intermittent asthma without complication 02/06/2017    Light tobacco smoker <10 cigarettes per day 02/06/2017    Allergic rhinitis 01/29/2016       Current Outpatient Medications on File Prior to Visit   Medication Sig Dispense Refill    albuterol sulfate  (90 Base) MCG/ACT inhaler INHALE 2 PUFFS EVERY 4 HOURS AS NEEDED FOR WHEEZING OR SHORTNESS OF AIR 18 g 1    ALLERGY SERUM INJECTION 2 injections per week      ciclopirox (PENLAC) 8 % solution Apply  topically to the appropriate area as directed Every Night. 6 mL 0    Estrogens Conjugated (Premarin) 0.625 MG/GM vaginal cream Apply pea sized amount three three weekly. 30 g 1    fexofenadine (ALLEGRA) 180 MG tablet Take 1 tablet by mouth 2 (Two) Times a Day.      Flaxseed, Linseed, (FLAX SEEDS PO) Take 1 tablet by mouth Daily.      Fluticasone Furoate-Vilanterol (BREO ELLIPTA IN) Inhale 1 puff Daily.      montelukast (SINGULAIR) 10 MG tablet Take 1 tablet by mouth Every Night.      Multiple Vitamin (MULTI VITAMIN PO) Take 1 tablet by mouth Daily.      Omega-3 Fatty Acids (FISH OIL PO) Take 1 tablet by mouth daily.      Triamcinolone Acetonide (NASACORT AQ NA) 1 spray into each nostril Daily As Needed.       No current facility-administered medications on file prior to visit.       Objective     /84   Pulse 76   Ht 172.7 cm (67.99\")   Wt 79.4 kg (175 lb)   SpO2 98%   BMI 26.61 kg/m²     Physical Exam  Constitutional:  "      Appearance: She is well-developed.   HENT:      Head: Normocephalic and atraumatic.   Pulmonary:      Effort: Pulmonary effort is normal.   Skin:     Comments: 1 cm area of erythema   Neurological:      Mental Status: She is alert and oriented to person, place, and time.   Psychiatric:         Behavior: Behavior normal.         Assessment & Plan   Diagnoses and all orders for this visit:    1. Skin lesion (Primary)        Discussion    Patient presents with a skin lesion likely a resolving abscess.  I would recommend topical mupirocin cream.  The patient is instructed to let our office know if not feeling better over the next week or if there is any change in symptoms.           Future Appointments   Date Time Provider Department Center   2/6/2024  9:15 AM Thao Vega MD [facility]   11/8/2024  3:30 PM [facility]   11/15/2024  3:30 PM Thao Vega MD [facility]

## 2024-01-23 NOTE — PATIENT INSTRUCTIONS
Dermatologists    [facility]  [address] [place] [address] [phone]    [facility]  [address]  [place], [place] [phone]    Dr. Danya Foley  [address] [place] [address] [phone]    Dr. Renata Raymond  [address] [address] [phone]

## 2024-03-06 ENCOUNTER — TRANSCRIBE ORDERS (OUTPATIENT)
Dept: INTERNAL MEDICINE | Facility: CLINIC | Age: 59
End: 2024-03-06
Payer: COMMERCIAL

## 2024-03-06 DIAGNOSIS — Z12.31 SCREENING MAMMOGRAM FOR BREAST CANCER: Primary | ICD-10-CM

## 2024-03-07 ENCOUNTER — TELEPHONE (OUTPATIENT)
Dept: SURGERY | Facility: CLINIC | Age: 59
End: 2024-03-07
Payer: COMMERCIAL

## 2024-03-07 DIAGNOSIS — Z12.11 ENCOUNTER FOR SCREENING COLONOSCOPY: Primary | ICD-10-CM

## 2024-03-07 NOTE — TELEPHONE ENCOUNTER
Left pt msg to call & schedule c-scope after 5/13 1 bottle mag citrate low residue diet 4 days prior & 2 enema am of procedure

## 2024-03-26 ENCOUNTER — OFFICE VISIT (OUTPATIENT)
Dept: INTERNAL MEDICINE | Facility: CLINIC | Age: 59
End: 2024-03-26
Payer: COMMERCIAL

## 2024-03-26 ENCOUNTER — HOSPITAL ENCOUNTER (OUTPATIENT)
Dept: CT IMAGING | Facility: HOSPITAL | Age: 59
Discharge: HOME | End: 2024-03-26
Admitting: INTERNAL MEDICINE
Payer: COMMERCIAL

## 2024-03-26 VITALS
HEIGHT: 68 IN | HEART RATE: 72 BPM | WEIGHT: 175 LBS | BODY MASS INDEX: 26.52 KG/M2 | DIASTOLIC BLOOD PRESSURE: 100 MMHG | SYSTOLIC BLOOD PRESSURE: 122 MMHG | OXYGEN SATURATION: 98 %

## 2024-03-26 DIAGNOSIS — S09.90XA TRAUMATIC INJURY OF HEAD, INITIAL ENCOUNTER: ICD-10-CM

## 2024-03-26 DIAGNOSIS — S09.90XA TRAUMATIC INJURY OF HEAD, INITIAL ENCOUNTER: Primary | ICD-10-CM

## 2024-03-26 DIAGNOSIS — R94.31 ABNORMAL EKG: ICD-10-CM

## 2024-03-26 DIAGNOSIS — R42 DIZZINESS: ICD-10-CM

## 2024-03-26 PROCEDURE — 70450 CT HEAD/BRAIN W/O DYE: CPT

## 2024-03-26 NOTE — PROGRESS NOTES
Subjective     Yi Hurtado is a 59 y.o. female who presents with   Chief Complaint   Patient presents with    Dizziness       Dizziness  Associated symptoms include headaches. Pertinent negatives include no chest pain.        She fell and hit head one week ago.  She had bend over and got dizzy.  No syncope.  Dizziness happens on occasion she states.   Since hitting head she has felt more foggy and had a headache.      Review of Systems   Respiratory:  Negative for chest tightness and shortness of breath.    Cardiovascular:  Negative for chest pain.   Neurological:  Positive for dizziness and headaches.       The following portions of the patient's history were reviewed and updated as appropriate: allergies, current medications and problem list.    Patient Active Problem List    Diagnosis Date Noted    Encounter for screening colonoscopy 03/07/2024    Vitamin D deficiency 03/27/2018    Mild intermittent asthma without complication 02/06/2017    Light tobacco smoker <10 cigarettes per day 02/06/2017    Allergic rhinitis 01/29/2016       Current Outpatient Medications on File Prior to Visit   Medication Sig Dispense Refill    albuterol sulfate  (90 Base) MCG/ACT inhaler INHALE 2 PUFFS EVERY 4 HOURS AS NEEDED FOR WHEEZING OR SHORTNESS OF AIR 18 g 1    ALLERGY SERUM INJECTION 2 injections per week      ciclopirox (PENLAC) 8 % solution Apply  topically to the appropriate area as directed Every Night. 6 mL 0    Estrogens Conjugated (Premarin) 0.625 MG/GM vaginal cream Apply pea sized amount three three weekly. 30 g 1    fexofenadine (ALLEGRA) 180 MG tablet Take 1 tablet by mouth 2 (Two) Times a Day.      Flaxseed, Linseed, (FLAX SEEDS PO) Take 1 tablet by mouth Daily.      Fluticasone Furoate-Vilanterol (BREO ELLIPTA IN) Inhale 1 puff Daily.      montelukast (SINGULAIR) 10 MG tablet Take 1 tablet by mouth Every Night.      Multiple Vitamin (MULTI VITAMIN PO) Take 1 tablet by mouth Daily.      mupirocin  "(BACTROBAN) 2 % ointment Apply 1 application  topically to the appropriate area as directed 3 (Three) Times a Day. 30 g 0    Omega-3 Fatty Acids (FISH OIL PO) Take 1 tablet by mouth daily.      Triamcinolone Acetonide (NASACORT AQ NA) 1 spray into each nostril Daily As Needed.       No current facility-administered medications on file prior to visit.       Objective     /100   Pulse 72   Ht 172.7 cm (67.99\")   Wt 79.4 kg (175 lb)   SpO2 98%   BMI 26.61 kg/m²     Physical Exam  Constitutional:       Appearance: She is well-developed.   HENT:      Head: Normocephalic. Raccoon eyes and contusion present.      Comments: Bruising on right side of forehead.       Right Ear: Hearing and tympanic membrane normal.      Left Ear: Hearing and tympanic membrane normal.      Mouth/Throat:      Pharynx: No oropharyngeal exudate or posterior oropharyngeal erythema.   Cardiovascular:      Rate and Rhythm: Normal rate and regular rhythm.      Heart sounds: Normal heart sounds.   Pulmonary:      Effort: Pulmonary effort is normal.      Breath sounds: Normal breath sounds.   Skin:     General: Skin is warm and dry.   Neurological:      Mental Status: She is alert and oriented to person, place, and time.   Psychiatric:         Behavior: Behavior normal.           ECG 12 Lead    Date/Time: 3/26/2024 2:55 PM  Performed by: Thao Vega MD    Authorized by: Thao Vega MD  Comparison: compared with previous ECG   Similar to previous ECG  Rhythm: sinus bradycardia  Rate: normal  Conduction: conduction normal  Q waves: V1, V2, V3 and V4    ST Segments: ST segments normal  T Waves: T waves normal  QRS axis: normal            Assessment & Plan   Diagnoses and all orders for this visit:    1. Traumatic injury of head, initial encounter (Primary)  -     CT Head Without Contrast; Future    2. Dizziness  -     CBC & Differential  -     Comprehensive Metabolic Panel  -     TSH Rfx On Abnormal To Free T4  -     Urinalysis With " Microscopic - Urine, Clean Catch  -     Holter Monitor - 48 Hour; Future    3. Abnormal EKG  -     Adult Stress Echo W/ Cont or Stress Agent if Necessary Per Protocol; Future        Discussion    Patient presents with persistent headache and fogginess after falling and hitting head one week ago.  STAT CT is ordered to rule out intracranial pathology.    Persistent dizziness.  EKG is baseline abnormal.  Check stress echo and Holter monitor.  Labs are ordered as well.  Further plans pending results.         Future Appointments   Date Time Provider Department Center   3/26/2024  2:30 PM Thao Vega MD [facility]   4/16/2024  7:15 AM [facility]   11/11/2024  8:30 AM [facility]   11/15/2024  3:30 PM Thao Vega MD [facility]

## 2024-03-27 LAB
ALBUMIN SERPL-MCNC: 4.3 G/DL (ref 3.5–5.2)
ALBUMIN/GLOB SERPL: 2 G/DL
ALP SERPL-CCNC: 86 U/L (ref 39–117)
ALT SERPL-CCNC: 11 U/L (ref 1–33)
APPEARANCE UR: CLEAR
AST SERPL-CCNC: 17 U/L (ref 1–32)
BACTERIA #/AREA URNS HPF: ABNORMAL /HPF
BASOPHILS # BLD AUTO: 0.1 10*3/MM3 (ref 0–0.2)
BASOPHILS NFR BLD AUTO: 1.3 % (ref 0–1.5)
BILIRUB SERPL-MCNC: 0.3 MG/DL (ref 0–1.2)
BILIRUB UR QL STRIP: NEGATIVE
BUN SERPL-MCNC: 13 MG/DL (ref 8–23)
BUN/CREAT SERPL: 20.6 (ref 7–25)
CALCIUM SERPL-MCNC: 9.1 MG/DL (ref 8.6–10.5)
CASTS URNS MICRO: ABNORMAL
CHLORIDE SERPL-SCNC: 104 MMOL/L (ref 98–107)
CO2 SERPL-SCNC: 29.5 MMOL/L (ref 22–29)
COLOR UR: YELLOW
CREAT SERPL-MCNC: 0.63 MG/DL (ref 0.57–1)
EGFRCR SERPLBLD CKD-EPI 2021: 101.1 ML/MIN/1.73
EOSINOPHIL # BLD AUTO: 0.14 10*3/MM3 (ref 0–0.4)
EOSINOPHIL NFR BLD AUTO: 1.8 % (ref 0.3–6.2)
EPI CELLS #/AREA URNS HPF: ABNORMAL /HPF
ERYTHROCYTE [DISTWIDTH] IN BLOOD BY AUTOMATED COUNT: 12.5 % (ref 12.3–15.4)
GLOBULIN SER CALC-MCNC: 2.2 GM/DL
GLUCOSE SERPL-MCNC: 91 MG/DL (ref 65–99)
GLUCOSE UR QL STRIP: NEGATIVE
HCT VFR BLD AUTO: 38.3 % (ref 34–46.6)
HGB BLD-MCNC: 12.5 G/DL (ref 12–15.9)
HGB UR QL STRIP: NEGATIVE
IMM GRANULOCYTES # BLD AUTO: 0.02 10*3/MM3 (ref 0–0.05)
IMM GRANULOCYTES NFR BLD AUTO: 0.3 % (ref 0–0.5)
KETONES UR QL STRIP: NEGATIVE
LEUKOCYTE ESTERASE UR QL STRIP: ABNORMAL
LYMPHOCYTES # BLD AUTO: 2.45 10*3/MM3 (ref 0.7–3.1)
LYMPHOCYTES NFR BLD AUTO: 31.4 % (ref 19.6–45.3)
MCH RBC QN AUTO: 28.9 PG (ref 26.6–33)
MCHC RBC AUTO-ENTMCNC: 32.6 G/DL (ref 31.5–35.7)
MCV RBC AUTO: 88.5 FL (ref 79–97)
MONOCYTES # BLD AUTO: 0.57 10*3/MM3 (ref 0.1–0.9)
MONOCYTES NFR BLD AUTO: 7.3 % (ref 5–12)
NEUTROPHILS # BLD AUTO: 4.53 10*3/MM3 (ref 1.7–7)
NEUTROPHILS NFR BLD AUTO: 57.9 % (ref 42.7–76)
NITRITE UR QL STRIP: POSITIVE
NRBC BLD AUTO-RTO: 0 /100 WBC (ref 0–0.2)
PH UR STRIP: 6 [PH] (ref 5–8)
PLATELET # BLD AUTO: 299 10*3/MM3 (ref 140–450)
POTASSIUM SERPL-SCNC: 4 MMOL/L (ref 3.5–5.2)
PROT SERPL-MCNC: 6.5 G/DL (ref 6–8.5)
PROT UR QL STRIP: NEGATIVE
RBC # BLD AUTO: 4.33 10*6/MM3 (ref 3.77–5.28)
RBC #/AREA URNS HPF: ABNORMAL /HPF
SODIUM SERPL-SCNC: 142 MMOL/L (ref 136–145)
SP GR UR STRIP: 1.01 (ref 1–1.03)
TSH SERPL DL<=0.005 MIU/L-ACNC: 0.85 UIU/ML (ref 0.27–4.2)
UROBILINOGEN UR STRIP-MCNC: ABNORMAL MG/DL
WBC # BLD AUTO: 7.81 10*3/MM3 (ref 3.4–10.8)
WBC #/AREA URNS HPF: ABNORMAL /HPF

## 2024-03-28 ENCOUNTER — TELEPHONE (OUTPATIENT)
Dept: INTERNAL MEDICINE | Facility: CLINIC | Age: 59
End: 2024-03-28

## 2024-03-28 NOTE — TELEPHONE ENCOUNTER
Caller: Yi Hurtado    Relationship to patient: Self    Best call back number: [phone]     Patient is needing:   PATIENT READ ON CHART THAT SHE HAS A UTI AND WILL START THE MEDICATION.

## 2024-04-16 ENCOUNTER — HOSPITAL ENCOUNTER (OUTPATIENT)
Dept: MAMMOGRAPHY | Facility: HOSPITAL | Age: 59
Discharge: HOME | End: 2024-04-16
Admitting: INTERNAL MEDICINE
Payer: COMMERCIAL

## 2024-04-16 DIAGNOSIS — R92.8 ABNORMAL MAMMOGRAM OF LEFT BREAST: Primary | ICD-10-CM

## 2024-04-16 DIAGNOSIS — Z12.31 SCREENING MAMMOGRAM FOR BREAST CANCER: ICD-10-CM

## 2024-04-16 PROCEDURE — 77067 SCR MAMMO BI INCL CAD: CPT

## 2024-04-16 PROCEDURE — 77063 BREAST TOMOSYNTHESIS BI: CPT

## 2024-04-18 ENCOUNTER — TELEPHONE (OUTPATIENT)
Dept: CARDIOLOGY | Facility: CLINIC | Age: 59
End: 2024-04-18
Payer: COMMERCIAL

## 2024-04-18 DIAGNOSIS — R92.8 ABNORMAL MAMMOGRAM OF LEFT BREAST: Primary | ICD-10-CM

## 2024-04-19 ENCOUNTER — HOSPITAL ENCOUNTER (OUTPATIENT)
Dept: CARDIOLOGY | Facility: HOSPITAL | Age: 59
Discharge: HOME | End: 2024-04-19
Payer: COMMERCIAL

## 2024-04-19 VITALS
BODY MASS INDEX: 27.47 KG/M2 | WEIGHT: 175 LBS | DIASTOLIC BLOOD PRESSURE: 88 MMHG | SYSTOLIC BLOOD PRESSURE: 144 MMHG | HEIGHT: 67 IN | HEART RATE: 67 BPM

## 2024-04-19 DIAGNOSIS — R94.31 ABNORMAL EKG: ICD-10-CM

## 2024-04-19 LAB
AORTIC ARCH: 3.7 CM
AORTIC DIMENSIONLESS INDEX: 0.9 (DI)
ASCENDING AORTA: 3.2 CM
BH CV ECHO MEAS - ACS: 2.22 CM
BH CV ECHO MEAS - AO MAX PG: 5.7 MMHG
BH CV ECHO MEAS - AO MEAN PG: 3.4 MMHG
BH CV ECHO MEAS - AO ROOT DIAM: 3.4 CM
BH CV ECHO MEAS - AO V2 MAX: 119.5 CM/SEC
BH CV ECHO MEAS - AO V2 VTI: 29.1 CM
BH CV ECHO MEAS - AVA(I,D): 3.1 CM2
BH CV ECHO MEAS - EDV(CUBED): 95.6 ML
BH CV ECHO MEAS - EDV(MOD-SP2): 138 ML
BH CV ECHO MEAS - EDV(MOD-SP4): 130 ML
BH CV ECHO MEAS - EF(MOD-BP): 56.1 %
BH CV ECHO MEAS - EF(MOD-SP2): 52.9 %
BH CV ECHO MEAS - EF(MOD-SP4): 59.2 %
BH CV ECHO MEAS - ESV(CUBED): 25.8 ML
BH CV ECHO MEAS - ESV(MOD-SP2): 65 ML
BH CV ECHO MEAS - ESV(MOD-SP4): 53 ML
BH CV ECHO MEAS - FS: 35.4 %
BH CV ECHO MEAS - IVS/LVPW: 0.98 CM
BH CV ECHO MEAS - IVSD: 1.17 CM
BH CV ECHO MEAS - LAT PEAK E' VEL: 8.7 CM/SEC
BH CV ECHO MEAS - LV DIASTOLIC VOL/BSA (35-75): 68 CM2
BH CV ECHO MEAS - LV MASS(C)D: 197.5 GRAMS
BH CV ECHO MEAS - LV MAX PG: 5.2 MMHG
BH CV ECHO MEAS - LV MEAN PG: 2.36 MMHG
BH CV ECHO MEAS - LV SYSTOLIC VOL/BSA (12-30): 27.7 CM2
BH CV ECHO MEAS - LV V1 MAX: 114.5 CM/SEC
BH CV ECHO MEAS - LV V1 VTI: 25.3 CM
BH CV ECHO MEAS - LVIDD: 4.6 CM
BH CV ECHO MEAS - LVIDS: 3 CM
BH CV ECHO MEAS - LVOT AREA: 3.6 CM2
BH CV ECHO MEAS - LVOT DIAM: 2.13 CM
BH CV ECHO MEAS - LVPWD: 1.19 CM
BH CV ECHO MEAS - MED PEAK E' VEL: 6.3 CM/SEC
BH CV ECHO MEAS - MV A DUR: 0.15 SEC
BH CV ECHO MEAS - MV A MAX VEL: 79.6 CM/SEC
BH CV ECHO MEAS - MV DEC SLOPE: 410.5 CM/SEC2
BH CV ECHO MEAS - MV DEC TIME: 0.21 SEC
BH CV ECHO MEAS - MV E MAX VEL: 91.8 CM/SEC
BH CV ECHO MEAS - MV E/A: 1.15
BH CV ECHO MEAS - MV MAX PG: 4.2 MMHG
BH CV ECHO MEAS - MV MEAN PG: 1.41 MMHG
BH CV ECHO MEAS - MV P1/2T: 61.4 MSEC
BH CV ECHO MEAS - MV V2 VTI: 31.2 CM
BH CV ECHO MEAS - MVA(P1/2T): 3.6 CM2
BH CV ECHO MEAS - MVA(VTI): 2.9 CM2
BH CV ECHO MEAS - PA ACC TIME: 0.14 SEC
BH CV ECHO MEAS - PA V2 MAX: 86.9 CM/SEC
BH CV ECHO MEAS - PULM A REVS DUR: 0.11 SEC
BH CV ECHO MEAS - PULM A REVS VEL: 31.6 CM/SEC
BH CV ECHO MEAS - PULM DIAS VEL: 49.4 CM/SEC
BH CV ECHO MEAS - PULM S/D: 1.09
BH CV ECHO MEAS - PULM SYS VEL: 53.9 CM/SEC
BH CV ECHO MEAS - RAP SYSTOLE: 3 MMHG
BH CV ECHO MEAS - RV MAX PG: 1.5 MMHG
BH CV ECHO MEAS - RV V1 MAX: 61.2 CM/SEC
BH CV ECHO MEAS - RV V1 VTI: 15.4 CM
BH CV ECHO MEAS - RVSP: 3 MMHG
BH CV ECHO MEAS - SI(MOD-SP2): 38.2 ML/M2
BH CV ECHO MEAS - SI(MOD-SP4): 40.3 ML/M2
BH CV ECHO MEAS - SUP REN AO DIAM: 2.3 CM
BH CV ECHO MEAS - SV(LVOT): 90.6 ML
BH CV ECHO MEAS - SV(MOD-SP2): 73 ML
BH CV ECHO MEAS - SV(MOD-SP4): 77 ML
BH CV ECHO MEAS - TAPSE (>1.6): 2.5 CM
BH CV ECHO MEASUREMENTS AVERAGE E/E' RATIO: 12.24
BH CV STRESS BP STAGE 1: NORMAL
BH CV STRESS DURATION MIN STAGE 1: 3
BH CV STRESS DURATION SEC STAGE 1: 0
BH CV STRESS ECHO POST STRESS EJECTION FRACTION EF: 75 %
BH CV STRESS GRADE STAGE 1: 10
BH CV STRESS HR STAGE 1: 132
BH CV STRESS METS STAGE 1: 5
BH CV STRESS PROTOCOL 1: NORMAL
BH CV STRESS SPEED STAGE 1: 1.7
BH CV STRESS STAGE 1: 1
BH CV XLRA - RV BASE: 3.8 CM
BH CV XLRA - RV LENGTH: 8.2 CM
BH CV XLRA - RV MID: 2.11 CM
BH CV XLRA - TDI S': 13.3 CM/SEC
LEFT ATRIUM VOLUME INDEX: 22.9 ML/M2
MAXIMAL PREDICTED HEART RATE: 159 BPM
PERCENT MAX PREDICTED HR: 83.02 %
SINUS: 3 CM
STJ: 3.1 CM
STRESS BASELINE BP: NORMAL MMHG
STRESS BASELINE HR: 67 BPM
STRESS PERCENT HR: 98 %
STRESS POST ESTIMATED WORKLOAD: 5 METS
STRESS POST EXERCISE DUR MIN: 3 MIN
STRESS POST EXERCISE DUR SEC: 0 SEC
STRESS POST PEAK BP: NORMAL MMHG
STRESS POST PEAK HR: 132 BPM
STRESS TARGET HR: 135 BPM

## 2024-04-19 PROCEDURE — 93350 STRESS TTE ONLY: CPT

## 2024-04-19 PROCEDURE — 93325 DOPPLER ECHO COLOR FLOW MAPG: CPT

## 2024-04-19 PROCEDURE — 25510000001 PERFLUTREN (DEFINITY) 8.476 MG IN SODIUM CHLORIDE (PF) 0.9 % 10 ML INJECTION: Performed by: INTERNAL MEDICINE

## 2024-04-19 PROCEDURE — 93320 DOPPLER ECHO COMPLETE: CPT

## 2024-04-19 PROCEDURE — 93017 CV STRESS TEST TRACING ONLY: CPT

## 2024-04-19 RX ADMIN — PERFLUTREN 4 ML: 6.52 INJECTION, SUSPENSION INTRAVENOUS at 14:55

## 2024-04-23 DIAGNOSIS — R94.39 ABNORMAL STRESS TEST: Primary | ICD-10-CM

## 2024-04-26 ENCOUNTER — TELEPHONE (OUTPATIENT)
Dept: INTERNAL MEDICINE | Facility: CLINIC | Age: 59
End: 2024-04-26
Payer: COMMERCIAL

## 2024-04-26 NOTE — TELEPHONE ENCOUNTER
----- Message from Margareth FINNEGAN sent at 4/26/2024  4:20 PM EDT -----  Call and advise.  No atrial fibrillation/flutter noted on Holter.  She does have runs of extra beats.  I would like her to see cardiology as referred.  I am going to see if someone can see her sooner.  GRACIELA

## 2024-04-26 NOTE — TELEPHONE ENCOUNTER
Can we see if cardiology can see sooner that August.  She had an abnormal stress test and an abnormal Holter.  Anyone in group is fine.  GRACIELA

## 2024-04-29 NOTE — TELEPHONE ENCOUNTER
Called [facility]. Patient scheduled this Wednesday 5/1/24 at 8:00 am with Dr. Titus  Patient aware of appointment change

## 2024-05-01 ENCOUNTER — OFFICE VISIT (OUTPATIENT)
Dept: CARDIOLOGY | Facility: CLINIC | Age: 59
End: 2024-05-01
Payer: COMMERCIAL

## 2024-05-01 VITALS
HEIGHT: 67 IN | BODY MASS INDEX: 28.25 KG/M2 | WEIGHT: 180 LBS | DIASTOLIC BLOOD PRESSURE: 78 MMHG | HEART RATE: 54 BPM | SYSTOLIC BLOOD PRESSURE: 124 MMHG

## 2024-05-01 DIAGNOSIS — R94.31 ABNORMAL EKG: Primary | ICD-10-CM

## 2024-05-01 DIAGNOSIS — R94.39 ABNORMAL STRESS ECHO: ICD-10-CM

## 2024-05-01 DIAGNOSIS — R42 DIZZINESS: ICD-10-CM

## 2024-05-01 PROCEDURE — 99204 OFFICE O/P NEW MOD 45 MIN: CPT | Performed by: INTERNAL MEDICINE

## 2024-05-01 PROCEDURE — 93000 ELECTROCARDIOGRAM COMPLETE: CPT | Performed by: INTERNAL MEDICINE

## 2024-05-01 RX ORDER — NITROFURANTOIN 25; 75 MG/1; MG/1
1 CAPSULE ORAL EVERY 12 HOURS SCHEDULED
COMMUNITY
Start: 2024-03-27

## 2024-05-01 NOTE — PROGRESS NOTES
CARDIOLOGY    Nicky Titus MD    ENCOUNTER DATE:  2024    Yi Hurtado / 59 y.o. / female        CHIEF COMPLAINT / REASON FOR OFFICE VISIT     Abnormal ECG      HISTORY OF PRESENT ILLNESS       HPI    Yi Hurtado is a 59 y.o. female     This is a lady whose history is a little confusing but she said she fell over because she felt lightheaded and hurt her ankle and her knee. She hit her head. She has risk factors that include a family history of premature coronary artery disease in her father who  at age 48. She has some chest discomfort, which she says associates as asthma when she forgets to take her medication. She said that has had some swelling in her knees and ankles, but generally does not have edema. She said she can walk more miles on the weekend with one of her friends. She was referred over here to have a stress echocardiogram, which showed normal LV left vein systolic function at 56%. No valve disease. The stress test was at maximal. She only went for 3 minutes on the treadmill and achieved 83% over age predicted maximum heart rate. There was possible antral apical hypokinesis.    I reviewed a CT scan of her chest done in  and I did not see any coronary artery calcifications.         REVIEW OF SYSTEMS     Review of Systems   Constitutional: Negative for chills, fever, weight gain and weight loss.   Cardiovascular:  Negative for leg swelling.   Respiratory:  Negative for cough, snoring and wheezing.    Hematologic/Lymphatic: Negative for bleeding problem. Does not bruise/bleed easily.   Skin:  Negative for color change.   Musculoskeletal:  Negative for falls, joint pain and myalgias.   Gastrointestinal:  Negative for melena.   Genitourinary:  Negative for hematuria.   Neurological:  Negative for excessive daytime sleepiness.   Psychiatric/Behavioral:  Negative for depression. The patient is not nervous/anxious.          VITAL SIGNS     Visit Vitals  /78 (BP Location:  "Left arm, Patient Position: Sitting)   Pulse 54   Ht 170.2 cm (67\")   Wt 81.6 kg (180 lb)   BMI 28.19 kg/m²         Wt Readings from Last 3 Encounters:   05/01/24 81.6 kg (180 lb)   04/19/24 79.4 kg (175 lb)   03/26/24 79.4 kg (175 lb)     Body mass index is 28.19 kg/m².      PHYSICAL EXAMINATION     Constitutional:       General: Not in acute distress.  Neck:      Vascular: No carotid bruit or JVD.   Pulmonary:      Effort: Pulmonary effort is normal.      Breath sounds: Normal breath sounds.   Cardiovascular:      Normal rate. Regular rhythm.      Murmurs: There is no murmur.   Psychiatric:         Mood and Affect: Mood and affect normal.           REVIEWED DATA       ECG 12 Lead    Date/Time: 5/1/2024 9:16 AM  Performed by: Nicky Titus MD    Authorized by: Nicky Titus MD  Comparison: compared with previous ECG from 3/26/2024  Similar to previous ECG  Rhythm: sinus rhythm  BPM: 54  Conduction: left posterior fascicular block  ST Segments: ST segments normal  T Waves: T waves normal  Other findings: poor R wave progression    Clinical impression: abnormal EKG            Lipid Panel          10/27/2023    08:17   Lipid Panel   Total Cholesterol 197    Triglycerides 46    HDL Cholesterol 74    VLDL Cholesterol 9    LDL Cholesterol  114        Lab Results   Component Value Date    GLUCOSE 91 03/26/2024    BUN 13 03/26/2024    CREATININE 0.63 03/26/2024    EGFRRESULT 101.1 03/26/2024    BCR 20.6 03/26/2024    K 4.0 03/26/2024    CO2 29.5 (H) 03/26/2024    CALCIUM 9.1 03/26/2024    PROTENTOTREF 6.5 03/26/2024    ALBUMIN 4.3 03/26/2024    BILITOT 0.3 03/26/2024    AST 17 03/26/2024    ALT 11 03/26/2024       ASSESSMENT & PLAN      Diagnosis Plan   1. Abnormal EKG  Stress Test With Myocardial Perfusion One Day      2. Dizziness  Stress Test With Myocardial Perfusion One Day      3. Abnormal stress echo  Stress Test With Myocardial Perfusion One Day          She has dizziness and fell which led to a stress " echocardiogram. Baseline echocardiogram was unremarkable. Stress echocardiogram showed very poor exercise capacity. There was possibly some abnormality even it was submaximal. CT chest in 2022 showed no coronary artery calcifications. I recommend repeating a stress test with a Lexiscan nuclear stress test because she did not do very well on the treadmill. If that looks normal, I would not do any further cardiac testing. Will have her followup with me as needed.         Orders Placed This Encounter   Procedures    Stress Test With Myocardial Perfusion One Day     Standing Status:   Future     Standing Expiration Date:   5/1/2025     Order Specific Question:   Rest/stress, rest only or stress only?     Answer:   Rest/Stress     Order Specific Question:   What stress agent will be used?     Answer:   Regadenoson (Lexiscan)     Order Specific Question:   Difficulty walking criteria?     Answer:   Musculoskeletal (hips, knees, feet, back, amputee)     Order Specific Question:   Reason for exam?     Answer:   Chest Pain     Order Specific Question:   Release to patient     Answer:   Routine Release [[id number]]           MEDICATIONS         Discharge Medications            Accurate as of May 1, 2024  9:14 AM. If you have any questions, ask your nurse or doctor.                Continue These Medications        Instructions Start Date   albuterol sulfate  (90 Base) MCG/ACT inhaler  Commonly known as: PROVENTIL HFA;VENTOLIN HFA;PROAIR HFA   INHALE 2 PUFFS EVERY 4 HOURS AS NEEDED FOR WHEEZING OR SHORTNESS OF AIR      ALLERGY SERUM INJECTION   2 injections per week       BREO ELLIPTA IN   1 puff, Inhalation, Daily      ciclopirox 8 % solution  Commonly known as: PENLAC   Topical, Nightly      fexofenadine 180 MG tablet  Commonly known as: ALLEGRA   180 mg, Oral, 2 Times Daily      FISH OIL PO   1 tablet, Oral, Daily      FLAX SEEDS PO   1 tablet, Oral, Daily      montelukast 10 MG tablet  Commonly known as: SINGULAIR    10 mg, Oral, Nightly      multivitamin tablet tablet  Commonly known as: THERAGRAN   1 tablet, Oral, Daily      mupirocin 2 % ointment  Commonly known as: BACTROBAN   1 application , Topical, 3 Times Daily      NASACORT AQ NA   1 spray, Nasal, Daily PRN      nitrofurantoin (macrocrystal-monohydrate) 100 MG capsule  Commonly known as: MACROBID   1 capsule, Oral, Every 12 Hours Scheduled      Premarin 0.625 MG/GM vaginal cream  Generic drug: Estrogens Conjugated   Apply pea sized amount three three weekly.                 Nicky Titus MD  05/01/24  09:14 EDT    Part of this note may be an electronic transcription/translation of spoken language to printed text using the Dragon dictation system.

## 2024-05-15 ENCOUNTER — TELEPHONE (OUTPATIENT)
Dept: CARDIOLOGY | Facility: CLINIC | Age: 59
End: 2024-05-15
Payer: COMMERCIAL

## 2024-05-16 ENCOUNTER — HOSPITAL ENCOUNTER (OUTPATIENT)
Dept: CARDIOLOGY | Facility: HOSPITAL | Age: 59
Discharge: HOME | End: 2024-05-16
Payer: COMMERCIAL

## 2024-05-16 ENCOUNTER — HOSPITAL ENCOUNTER (OUTPATIENT)
Dept: MAMMOGRAPHY | Facility: HOSPITAL | Age: 59
Discharge: HOME | End: 2024-05-16
Payer: COMMERCIAL

## 2024-05-16 ENCOUNTER — APPOINTMENT (OUTPATIENT)
Dept: ULTRASOUND IMAGING | Facility: HOSPITAL | Age: 59
End: 2024-05-16
Payer: COMMERCIAL

## 2024-05-16 VITALS — BODY MASS INDEX: 27.28 KG/M2 | HEIGHT: 68 IN | WEIGHT: 180 LBS

## 2024-05-16 DIAGNOSIS — R94.31 ABNORMAL EKG: ICD-10-CM

## 2024-05-16 DIAGNOSIS — R92.8 ABNORMAL MAMMOGRAM OF LEFT BREAST: ICD-10-CM

## 2024-05-16 DIAGNOSIS — R94.39 ABNORMAL STRESS ECHO: ICD-10-CM

## 2024-05-16 DIAGNOSIS — R42 DIZZINESS: ICD-10-CM

## 2024-05-16 LAB
BH CV NUCLEAR PRIOR STUDY: 2
BH CV REST NUCLEAR ISOTOPE DOSE: 11 MCI
BH CV STRESS BP STAGE 1: NORMAL
BH CV STRESS COMMENTS STAGE 1: NORMAL
BH CV STRESS DOSE REGADENOSON STAGE 1: 0.4
BH CV STRESS DURATION MIN STAGE 1: 0
BH CV STRESS DURATION SEC STAGE 1: 10
BH CV STRESS HR STAGE 1: 130
BH CV STRESS NUCLEAR ISOTOPE DOSE: 35.3 MCI
BH CV STRESS PROTOCOL 1: NORMAL
BH CV STRESS RECOVERY BP: NORMAL MMHG
BH CV STRESS RECOVERY HR: 105 BPM
BH CV STRESS STAGE 1: 1
LV EF NUC BP: 65 %
MAXIMAL PREDICTED HEART RATE: 159 BPM
PERCENT MAX PREDICTED HR: 81.76 %
STRESS BASELINE BP: NORMAL MMHG
STRESS BASELINE HR: 75 BPM
STRESS PERCENT HR: 96 %
STRESS POST EXERCISE DUR SEC: 10 SEC
STRESS POST PEAK BP: NORMAL MMHG
STRESS POST PEAK HR: 130 BPM
STRESS TARGET HR: 135 BPM

## 2024-05-16 PROCEDURE — G0279 TOMOSYNTHESIS, MAMMO: HCPCS

## 2024-05-16 PROCEDURE — 25010000002 REGADENOSON 0.4 MG/5ML SOLUTION: Performed by: INTERNAL MEDICINE

## 2024-05-16 PROCEDURE — 0 TECHNETIUM TETROFOSMIN KIT: Performed by: INTERNAL MEDICINE

## 2024-05-16 PROCEDURE — A9502 TC99M TETROFOSMIN: HCPCS | Performed by: INTERNAL MEDICINE

## 2024-05-16 PROCEDURE — 93017 CV STRESS TEST TRACING ONLY: CPT

## 2024-05-16 PROCEDURE — 78452 HT MUSCLE IMAGE SPECT MULT: CPT

## 2024-05-16 PROCEDURE — 77065 DX MAMMO INCL CAD UNI: CPT

## 2024-05-16 RX ORDER — REGADENOSON 0.08 MG/ML
0.4 INJECTION, SOLUTION INTRAVENOUS
Status: COMPLETED | OUTPATIENT
Start: 2024-05-16 | End: 2024-05-16

## 2024-05-16 RX ADMIN — TETROFOSMIN 1 DOSE: 1.38 INJECTION, POWDER, LYOPHILIZED, FOR SOLUTION INTRAVENOUS at 11:35

## 2024-05-16 RX ADMIN — TETROFOSMIN 1 DOSE: 1.38 INJECTION, POWDER, LYOPHILIZED, FOR SOLUTION INTRAVENOUS at 12:30

## 2024-05-16 RX ADMIN — REGADENOSON 0.4 MG: 0.08 INJECTION, SOLUTION INTRAVENOUS at 12:30

## 2024-05-17 ENCOUNTER — TELEPHONE (OUTPATIENT)
Dept: CARDIOLOGY | Facility: CLINIC | Age: 59
End: 2024-05-17
Payer: COMMERCIAL

## 2024-05-17 NOTE — TELEPHONE ENCOUNTER
Notified patient of results/recommendations. Patient verbalized understanding.    Danielle Domingo RN  Triage [facility] [place]

## 2024-05-17 NOTE — TELEPHONE ENCOUNTER
Please let her know that the stress test was normal.  No evidence of old heart attacks or any tightly blocked arteries.  Follow-up with me if her symptoms change otherwise follow-up with Dr. Vega.

## 2024-06-03 ENCOUNTER — ANESTHESIA (OUTPATIENT)
Dept: GASTROENTEROLOGY | Facility: HOSPITAL | Age: 59
End: 2024-06-03
Payer: COMMERCIAL

## 2024-06-03 ENCOUNTER — ANESTHESIA EVENT (OUTPATIENT)
Dept: GASTROENTEROLOGY | Facility: HOSPITAL | Age: 59
End: 2024-06-03
Payer: COMMERCIAL

## 2024-06-03 ENCOUNTER — HOSPITAL ENCOUNTER (OUTPATIENT)
Facility: HOSPITAL | Age: 59
Setting detail: HOSPITAL OUTPATIENT SURGERY
Discharge: HOME | End: 2024-06-03
Attending: SURGERY | Admitting: SURGERY
Payer: COMMERCIAL

## 2024-06-03 VITALS
OXYGEN SATURATION: 99 % | SYSTOLIC BLOOD PRESSURE: 119 MMHG | HEIGHT: 69 IN | RESPIRATION RATE: 14 BRPM | DIASTOLIC BLOOD PRESSURE: 80 MMHG | HEART RATE: 54 BPM | BODY MASS INDEX: 25.89 KG/M2 | WEIGHT: 174.8 LBS

## 2024-06-03 DIAGNOSIS — Z12.11 ENCOUNTER FOR SCREENING COLONOSCOPY: ICD-10-CM

## 2024-06-03 PROCEDURE — 45380 COLONOSCOPY AND BIOPSY: CPT | Performed by: SURGERY

## 2024-06-03 PROCEDURE — 25010000002 GLUCAGON (RDNA) PER 1 MG: Performed by: SURGERY

## 2024-06-03 PROCEDURE — 25810000003 LACTATED RINGERS PER 1000 ML: Performed by: SURGERY

## 2024-06-03 PROCEDURE — 25010000002 PROPOFOL 1000 MG/100ML EMULSION

## 2024-06-03 PROCEDURE — 88305 TISSUE EXAM BY PATHOLOGIST: CPT | Performed by: SURGERY

## 2024-06-03 RX ORDER — PROPOFOL 10 MG/ML
INJECTION, EMULSION INTRAVENOUS AS NEEDED
Status: DISCONTINUED | OUTPATIENT
Start: 2024-06-03 | End: 2024-06-03 | Stop reason: SURG

## 2024-06-03 RX ORDER — LIDOCAINE HYDROCHLORIDE 20 MG/ML
INJECTION, SOLUTION INFILTRATION; PERINEURAL AS NEEDED
Status: DISCONTINUED | OUTPATIENT
Start: 2024-06-03 | End: 2024-06-03 | Stop reason: SURG

## 2024-06-03 RX ORDER — IBUPROFEN 600 MG/1
TABLET ORAL AS NEEDED
Status: DISCONTINUED | OUTPATIENT
Start: 2024-06-03 | End: 2024-06-03 | Stop reason: HOSPADM

## 2024-06-03 RX ORDER — SODIUM CHLORIDE, SODIUM LACTATE, POTASSIUM CHLORIDE, CALCIUM CHLORIDE 600; 310; 30; 20 MG/100ML; MG/100ML; MG/100ML; MG/100ML
30 INJECTION, SOLUTION INTRAVENOUS CONTINUOUS PRN
Status: DISCONTINUED | OUTPATIENT
Start: 2024-06-03 | End: 2024-06-03 | Stop reason: HOSPADM

## 2024-06-03 RX ADMIN — LIDOCAINE HYDROCHLORIDE 60 MG: 20 INJECTION, SOLUTION INFILTRATION; PERINEURAL at 07:01

## 2024-06-03 RX ADMIN — PROPOFOL INJECTABLE EMULSION 100 MG: 10 INJECTION, EMULSION INTRAVENOUS at 07:01

## 2024-06-03 RX ADMIN — PROPOFOL INJECTABLE EMULSION 160 MCG/KG/MIN: 10 INJECTION, EMULSION INTRAVENOUS at 07:02

## 2024-06-03 RX ADMIN — SODIUM CHLORIDE, POTASSIUM CHLORIDE, SODIUM LACTATE AND CALCIUM CHLORIDE 30 ML/HR: 600; 310; 30; 20 INJECTION, SOLUTION INTRAVENOUS at 06:31

## 2024-06-03 NOTE — ANESTHESIA POSTPROCEDURE EVALUATION
"Patient: Yi Hurtado    Procedure Summary       Date: 06/03/24 Room / Location:  [facility]    Anesthesia Start: 0657 Anesthesia Stop: 0731    Procedure: COLONOSCOPY TO CECUM WITH COLD BIOPSIES Diagnosis:       Encounter for screening colonoscopy      (Encounter for screening colonoscopy [Z12.11])    Surgeons: Leana Greer MD Provider: Zoran Torres MD    Anesthesia Type: MAC ASA Status: 2            Anesthesia Type: MAC    Vitals  Vitals Value Taken Time   /80 06/03/24 0751   Temp     Pulse 53 06/03/24 0753   Resp 14 06/03/24 0750   SpO2 98 % 06/03/24 0753   Vitals shown include unfiled device data.        Post Anesthesia Care and Evaluation    Level of consciousness: awake and alert  Pain management: adequate  Anesthetic complications: No anesthetic complications    Cardiovascular status: acceptable  Respiratory status: acceptable  Hydration status: acceptable    Comments: /80 (BP Location: Left arm, Patient Position: Sitting)   Pulse 54   Resp 14   Ht 174 cm (68.5\")   Wt 79.3 kg (174 lb 12.8 oz)   SpO2 99%   BMI 26.19 kg/m²       "

## 2024-06-03 NOTE — OP NOTE
Colonoscopy Procedure Note  Yi Hurtado  1965  Date of Procedure: 06/03/24    Pre-operative Diagnosis:    History of 1.2 cm rectosigmoid polyp, 1 cm based, removed via ink lift, snare cautery and clipping, tubulovillous adenoma 11/23  History of another tubular adenoma, 11/23  tortuous colon.    Post-operative Diagnosis:  Mucosal puckering at polypectomy site, benign in appearance.  Biopsied with cold biopsy forceps  Tortuous colon    Procedure: Colonoscopy with polypectomy     Findings/Treatments:   Moderate prep with areas of nontranslucent stool, liquid, mostly suctionable.        Recommendations:   Colonoscopy in 3 years if benign.  The office will call within the next  3-10 days with a final recommendation.  Keep a copy of the photographs of the procedure given to you today for possible need for reference in the future.      Surgeon: Zoey    Anesthetic: MAC per Zoran Torres MD    Scope Withdrawal Time:  7 minutes  40 seconds    Procedure Details     MAC anesthesia was induced.  The 180 Colonoscopy was inserted blindly into the rectum and advanced to the cecum, with relative ease, with need for lift, without turning.    Cecum was identified by the appendiceal orifice and the ileocecal valve and photographed for documentation.      Prep quality was as above.  A careful inspection was made as the scope was withdrawn, including a retroflexed view of the rectum; there was no suggestion of presence of angiodysplasias, colitis, polyps or diverticula, but there was the inking with puckering, with  interventions.     Retroflexion in the rectum revealed no abnormalities.      Leana Greer MD  06/03/24

## 2024-06-03 NOTE — H&P
Cc: Endoscopy Visit    HPI: 59 y.o. female here for follow up of a 1.2 cm rectosigmoid polyp, 1 cm based, removed via ink lift, snare cautery and clipping, tubulovillous adenoma with another tubular adenoma.  She did have a tortuous colon.    Past Medical History:   Diagnosis Date    Allergic     sulpha, cipro    Asthma     Blood tests for routine general physical examination     Dysphagia 06/10/2013    Dr. Leana Greer    Earache     Encounter for screening colonoscopy 3/7/2024    Eustachian tube dysfunction     Injury of back     lumbar bulging discs, unsure level    Irritable bowel syndrome     Lung nodule 10/28/2022    Myelopathy 1984    Dr. Ross Fernandez and Dr. Moises Orozco    Seizures     Simple goiter     Spinal stenosis     Urinary tract infection        Past Surgical History:   Procedure Laterality Date    APPENDECTOMY N/A 1969    BREAST BIOPSY      CATARACT EXTRACTION, BILATERAL Bilateral 3/2017, 4/2017    COLONOSCOPY N/A 11/13/2023    Procedure: COLONOSCOPY to cecum with cold biopsy polypectomy, hot snare polypectomy, 1.5 cc tattoo ink in recto-sigmoid colon with resolution clip placement x2;  Surgeon: Leana Greer MD;  Location: [facility] END[facility];  Service: General;  Laterality: N/A;  Pre: screening  post: torturous colon, polyp    ENDOSCOPY AND COLONOSCOPY N/A 06/10/2013    Mild suggestion of a ringed esophagus, biopsied to r/o eosinophilic esophagitis-Dr. Leana Greer    MAMMO FINE NEEDLE ASPIRATION UNILATERAL Right 2013    pathology negative    PILONIDAL CYSTECTOMY N/A     VAGINAL HYSTERECTOMY SALPINGO OOPHORECTOMY WITH ANTERIOR AND POSTERIOR VAGINAL REPAIR N/A 04/27/2011    Total vaginal hysterectomy, external Andrews's culdoplasty and fulguration of implant of culk-de-sac endometriosis-Dr. Gildardo Howard       is allergic to ciprofloxacin and sulfa antibiotics.       Medication List        ASK your doctor about these medications      albuterol sulfate  (90 Base) MCG/ACT inhaler  Commonly  known as: PROVENTIL HFA;VENTOLIN HFA;PROAIR HFA  INHALE 2 PUFFS EVERY 4 HOURS AS NEEDED FOR WHEEZING OR SHORTNESS OF AIR     ALLERGY SERUM INJECTION     BREO ELLIPTA IN     ciclopirox 8 % solution  Commonly known as: PENLAC  Apply  topically to the appropriate area as directed Every Night.     fexofenadine 180 MG tablet  Commonly known as: ALLEGRA     FISH OIL PO     FLAX SEEDS PO     ibuprofen 100 MG/5ML suspension  Commonly known as: ADVIL,MOTRIN     montelukast 10 MG tablet  Commonly known as: SINGULAIR     multivitamin tablet tablet  Commonly known as: THERAGRAN     mupirocin 2 % ointment  Commonly known as: BACTROBAN  Apply 1 application  topically to the appropriate area as directed 3 (Three) Times a Day.     NASACORT AQ NA     nitrofurantoin (macrocrystal-monohydrate) 100 MG capsule  Commonly known as: MACROBID     Premarin 0.625 MG/GM vaginal cream  Generic drug: Estrogens Conjugated  Apply pea sized amount three three weekly.              Family History   Problem Relation Age of Onset    Dementia Mother     Hypothyroidism Mother     Hypertension Mother     Diabetes type II Mother     Stroke Mother     Thyroid cancer Mother     Alcohol abuse Father     Other Father         acute heart attack    Heart disease Father     ADD / ADHD Son     Breast cancer Neg Hx     Malig Hyperthermia Neg Hx        Social History     Socioeconomic History    Marital status:      Spouse name: Angel Luis    Number of children: 2   Tobacco Use    Smoking status: Every Day     Current packs/day: 0.10     Average packs/day: 0.1 packs/day for 45.4 years (4.5 ttl pk-yrs)     Types: Cigarettes     Start date: 1/6/1979    Smokeless tobacco: Never   Vaping Use    Vaping status: Never Used   Substance and Sexual Activity    Alcohol use: Yes     Alcohol/week: 1.0 standard drink of alcohol     Types: 1 Standard drinks or equivalent per week    Sexual activity: Defer       Vitals:    06/03/24 0625   BP: 139/76   Pulse: 53   Resp: 13    SpO2: 98%       Body mass index is 26.19 kg/m².      Physical Exam    General: No acute distress  Lungs: No labored breathing, Pulse oximetry on room air is 98%.  Heart/EKG: RRR  Abdomen: no complaints of pain  Mental:  Awake, alert, and oriented    Imp:     History of 1.2 cm rectosigmoid polyp, 1 cm based, removed via ink lift, snare cautery and clipping, tubulovillous adenoma 11/23  History of another tubular adenoma, 11/23  tortuous colon.     Plan:  C scope    Leana Greer MD  06:57 EDT

## 2024-06-03 NOTE — ANESTHESIA PREPROCEDURE EVALUATION
Anesthesia Evaluation     Patient summary reviewed and Nursing notes reviewed   NPO Solid Status: > 8 hours  NPO Liquid Status: > 2 hours           Airway   Mallampati: II  TM distance: >3 FB  Neck ROM: full  Dental      Pulmonary - normal exam   (+) a smoker Current, Abstained day of surgery, cigarettes, COPD,  Cardiovascular - negative cardio ROS    ECG reviewed  Rhythm: regular  Rate: normal        Neuro/Psych  (+) seizures well controlled    ROS Comment: 40 yrs ago  GI/Hepatic/Renal/Endo    (+) thyroid problem     Musculoskeletal (-) negative ROS    Abdominal    Substance History   (+) alcohol use     OB/GYN negative ob/gyn ROS         Other                          Anesthesia Plan    ASA 2     MAC       Anesthetic plan, risks, benefits, and alternatives have been provided, discussed and informed consent has been obtained with: patient and spouse/significant other.      CODE STATUS:

## 2024-06-04 ENCOUNTER — OFFICE VISIT (OUTPATIENT)
Dept: INTERNAL MEDICINE | Facility: CLINIC | Age: 59
End: 2024-06-04
Payer: COMMERCIAL

## 2024-06-04 VITALS
DIASTOLIC BLOOD PRESSURE: 80 MMHG | OXYGEN SATURATION: 98 % | WEIGHT: 173 LBS | SYSTOLIC BLOOD PRESSURE: 124 MMHG | BODY MASS INDEX: 25.62 KG/M2 | HEIGHT: 69 IN | HEART RATE: 96 BPM

## 2024-06-04 DIAGNOSIS — M25.572 LEFT ANKLE PAIN, UNSPECIFIED CHRONICITY: ICD-10-CM

## 2024-06-04 DIAGNOSIS — M79.642 LEFT HAND PAIN: ICD-10-CM

## 2024-06-04 DIAGNOSIS — M79.672 LEFT FOOT PAIN: Primary | ICD-10-CM

## 2024-06-04 LAB
LAB AP CASE REPORT: NORMAL
PATH REPORT.FINAL DX SPEC: NORMAL
PATH REPORT.GROSS SPEC: NORMAL

## 2024-06-04 PROCEDURE — 99213 OFFICE O/P EST LOW 20 MIN: CPT | Performed by: INTERNAL MEDICINE

## 2024-06-04 PROCEDURE — 73630 X-RAY EXAM OF FOOT: CPT | Performed by: INTERNAL MEDICINE

## 2024-06-04 PROCEDURE — 73130 X-RAY EXAM OF HAND: CPT | Performed by: INTERNAL MEDICINE

## 2024-06-04 PROCEDURE — 73610 X-RAY EXAM OF ANKLE: CPT | Performed by: INTERNAL MEDICINE

## 2024-06-04 NOTE — PROGRESS NOTES
Subjective     Yi Hurtado is a 59 y.o. female who presents with   Chief Complaint   Patient presents with    Foot Pain    Hand Pain    left hand pain       History of Present Illness     C/o left foot/ankle pain.  Going on three months.  Started after twisting injury.  Pain has continued despite rest.      C/o left hand pain.  Pain in fingers.  Started after a fall two weeks ago.      Review of Systems   Respiratory: Negative.     Cardiovascular: Negative.        The following portions of the patient's history were reviewed and updated as appropriate: allergies, current medications and problem list.    Patient Active Problem List    Diagnosis Date Noted    Encounter for screening colonoscopy 03/07/2024    Vitamin D deficiency 03/27/2018    Mild intermittent asthma without complication 02/06/2017    Light tobacco smoker <10 cigarettes per day 02/06/2017    Allergic rhinitis 01/29/2016       Current Outpatient Medications on File Prior to Visit   Medication Sig Dispense Refill    albuterol sulfate  (90 Base) MCG/ACT inhaler INHALE 2 PUFFS EVERY 4 HOURS AS NEEDED FOR WHEEZING OR SHORTNESS OF AIR 18 g 1    ALLERGY SERUM INJECTION 2 injections per week      ciclopirox (PENLAC) 8 % solution Apply  topically to the appropriate area as directed Every Night. 6 mL 0    Estrogens Conjugated (Premarin) 0.625 MG/GM vaginal cream Apply pea sized amount three three weekly. 30 g 1    fexofenadine (ALLEGRA) 180 MG tablet Take 1 tablet by mouth 2 (Two) Times a Day.      Flaxseed, Linseed, (FLAX SEEDS PO) Take 1 tablet by mouth Daily.      Fluticasone Furoate-Vilanterol (BREO ELLIPTA IN) Inhale 1 puff Daily.      ibuprofen (ADVIL,MOTRIN) 100 MG/5ML suspension Take 10 mL by mouth Every 6 (Six) Hours As Needed for Mild Pain.      montelukast (SINGULAIR) 10 MG tablet Take 1 tablet by mouth Every Night.      Multiple Vitamin (MULTI VITAMIN PO) Take 1 tablet by mouth Daily.      mupirocin (BACTROBAN) 2 % ointment Apply 1  "application  topically to the appropriate area as directed 3 (Three) Times a Day. 30 g 0    nitrofurantoin, macrocrystal-monohydrate, (MACROBID) 100 MG capsule Take 1 capsule by mouth Every 12 (Twelve) Hours.      Omega-3 Fatty Acids (FISH OIL PO) Take 1 tablet by mouth daily.      Triamcinolone Acetonide (NASACORT AQ NA) 1 spray into each nostril Daily As Needed.       Current Facility-Administered Medications on File Prior to Visit   Medication Dose Route Frequency Provider Last Rate Last Admin    [DISCONTINUED] lactated ringers infusion  30 mL/hr Intravenous Continuous PRN Leana Greer MD 30 mL/hr at 06/03/24 0657 Currently Infusing at 06/03/24 0657       Objective     /80   Pulse 96   Ht 174 cm (68.5\")   Wt 78.5 kg (173 lb)   SpO2 98%   BMI 25.92 kg/m²     Physical Exam  Constitutional:       Appearance: She is well-developed.   HENT:      Head: Normocephalic and atraumatic.   Pulmonary:      Effort: Pulmonary effort is normal.   Musculoskeletal:      Left hand: Swelling and tenderness present. No bony tenderness. Normal range of motion.      Left ankle: Swelling present. Tenderness present.      Left foot: Tenderness present. No swelling, deformity or bony tenderness.   Neurological:      Mental Status: She is alert and oriented to person, place, and time.   Psychiatric:         Behavior: Behavior normal.     X-rays of the hand, ankle, feet  performed today for following indication:   pain.  X-ray reveal no fractures.  There is no available x-ray for comparison.  X-ray sent to radiology for official interpretation and findings.        Assessment & Plan   Diagnoses and all orders for this visit:    1. Left foot pain (Primary)  -     XR Foot 3+ View Left (In Office)  -     XR Ankle 3+ View Left (In Office)    2. Left ankle pain, unspecified chronicity  -     XR Foot 3+ View Left (In Office)  -     XR Ankle 3+ View Left (In Office)    3. Left hand pain  -     XR Hand 3+ View Left (In " Office)        Discussion    Patient presents with persistent pain in foot and ankle after twisting injury.  No obvious fracture on x-ray.  I would like her to see foot orthopedic for an opinion.    Patient with persistent hand pain after fall.  No obvious fracture on x-ray.  I discussed with the patient a trial of conservative management with:   NSAIDs and rest.  Let me know if not feeling better over the next several weeks or if there is any change in symptoms.         Future Appointments   Date Time Provider Department Center   11/11/2024  8:30 AM [facility]   11/15/2024  3:30 PM Thao Vega MD [facility]

## 2024-06-05 ENCOUNTER — TELEPHONE (OUTPATIENT)
Dept: SURGERY | Facility: CLINIC | Age: 59
End: 2024-06-05
Payer: COMMERCIAL

## 2024-06-05 NOTE — TELEPHONE ENCOUNTER
----- Message from Leana Greer sent at 6/5/2024  7:12 AM EDT -----  Ragini (endoscopy liaison),    Please call patient tto inform them of these findings and recommendations and ensure that any pamphlets that were to be given to the patient at the hospital were received.  Ensure that a letter is sent to the patient, recall method entered into the computer and the HM (Health Maintenance) section updated as to recommended endoscopy follow up.    Thanks  Dr Greer    Colonoscopy Procedure Note  Yi Hurtado  1965  Date of Procedure: 06/03/24     Pre-operative Diagnosis:    · History of 1.2 cm rectosigmoid polyp, 1 cm based, removed via ink lift, snare cautery and clipping, tubulovillous adenoma 11/23  · History of another tubular adenoma, 11/23  · tortuous colon.     Post-operative Diagnosis:  · Mucosal puckering at polypectomy site, benign in appearance.  Biopsied with cold biopsy forceps;  BENIGN  · Tortuous colon     Procedure: Colonoscopy with polypectomy      Findings/Treatments:   · Moderate prep with areas of nontranslucent stool, liquid, mostly suctionable.                                         Recommendations:   1. Colonoscopy in 3 years if benign.  The office will call within the next  3-10 days with a final recommendation.;  YES, 3 YEARS  2. Keep a copy of the photographs of the procedure given to you today for possible need for reference in the future.

## 2024-06-05 NOTE — PROGRESS NOTES
Ragini (endoscopy liaison),    Please call patient tto inform them of these findings and recommendations and ensure that any pamphlets that were to be given to the patient at the hospital were received.  Ensure that a letter is sent to the patient, recall method entered into the computer and the HM (Health Maintenance) section updated as to recommended endoscopy follow up.    Thanks  Dr Greer    Colonoscopy Procedure Note  Yi Hurtado  1965  Date of Procedure: 06/03/24    Pre-operative Diagnosis:    · History of 1.2 cm rectosigmoid polyp, 1 cm based, removed via ink lift, snare cautery and clipping, tubulovillous adenoma 11/23  · History of another tubular adenoma, 11/23  · tortuous colon.    Post-operative Diagnosis:  · Mucosal puckering at polypectomy site, benign in appearance.  Biopsied with cold biopsy forceps;  BENIGN  · Tortuous colon    Procedure: Colonoscopy with polypectomy     Findings/Treatments:   · Moderate prep with areas of nontranslucent stool, liquid, mostly suctionable.     Recommendations:   1. Colonoscopy in 3 years if benign.  The office will call within the next  3-10 days with a final recommendation.;  YES, 3 YEARS  2. Keep a copy of the photographs of the procedure given to you today for possible need for reference in the future.

## 2024-11-11 DIAGNOSIS — Z00.00 HEALTH MAINTENANCE EXAMINATION: Primary | ICD-10-CM

## 2024-11-11 DIAGNOSIS — E55.9 VITAMIN D DEFICIENCY: ICD-10-CM

## 2024-11-11 LAB
25(OH)D3+25(OH)D2 SERPL-MCNC: 23.8 NG/ML (ref 30–100)
ALBUMIN SERPL-MCNC: 4 G/DL (ref 3.5–5.2)
ALBUMIN/GLOB SERPL: 1.7 G/DL
ALP SERPL-CCNC: 88 U/L (ref 39–117)
ALT SERPL-CCNC: 13 U/L (ref 1–33)
APPEARANCE UR: CLEAR
AST SERPL-CCNC: 22 U/L (ref 1–32)
BACTERIA #/AREA URNS HPF: ABNORMAL /HPF
BASOPHILS # BLD AUTO: 0.1 10*3/MM3 (ref 0–0.2)
BASOPHILS NFR BLD AUTO: 1.4 % (ref 0–1.5)
BILIRUB SERPL-MCNC: 0.4 MG/DL (ref 0–1.2)
BILIRUB UR QL STRIP: NEGATIVE
BUN SERPL-MCNC: 12 MG/DL (ref 8–23)
BUN/CREAT SERPL: 20 (ref 7–25)
CALCIUM SERPL-MCNC: 8.9 MG/DL (ref 8.6–10.5)
CASTS URNS MICRO: ABNORMAL
CHLORIDE SERPL-SCNC: 106 MMOL/L (ref 98–107)
CHOLEST SERPL-MCNC: 191 MG/DL (ref 0–200)
CO2 SERPL-SCNC: 28.7 MMOL/L (ref 22–29)
COLOR UR: YELLOW
CREAT SERPL-MCNC: 0.6 MG/DL (ref 0.57–1)
EGFRCR SERPLBLD CKD-EPI 2021: 102.3 ML/MIN/1.73
EOSINOPHIL # BLD AUTO: 0.13 10*3/MM3 (ref 0–0.4)
EOSINOPHIL NFR BLD AUTO: 1.9 % (ref 0.3–6.2)
EPI CELLS #/AREA URNS HPF: ABNORMAL /HPF
ERYTHROCYTE [DISTWIDTH] IN BLOOD BY AUTOMATED COUNT: 12.4 % (ref 12.3–15.4)
GLOBULIN SER CALC-MCNC: 2.3 GM/DL
GLUCOSE SERPL-MCNC: 87 MG/DL (ref 65–99)
GLUCOSE UR QL STRIP: NEGATIVE
HCT VFR BLD AUTO: 39.5 % (ref 34–46.6)
HDLC SERPL-MCNC: 72 MG/DL (ref 40–60)
HGB BLD-MCNC: 12.3 G/DL (ref 12–15.9)
HGB UR QL STRIP: NEGATIVE
IMM GRANULOCYTES # BLD AUTO: 0.02 10*3/MM3 (ref 0–0.05)
IMM GRANULOCYTES NFR BLD AUTO: 0.3 % (ref 0–0.5)
KETONES UR QL STRIP: NEGATIVE
LDLC SERPL CALC-MCNC: 108 MG/DL (ref 0–100)
LEUKOCYTE ESTERASE UR QL STRIP: ABNORMAL
LYMPHOCYTES # BLD AUTO: 1.94 10*3/MM3 (ref 0.7–3.1)
LYMPHOCYTES NFR BLD AUTO: 28 % (ref 19.6–45.3)
MCH RBC QN AUTO: 28 PG (ref 26.6–33)
MCHC RBC AUTO-ENTMCNC: 31.1 G/DL (ref 31.5–35.7)
MCV RBC AUTO: 90 FL (ref 79–97)
MONOCYTES # BLD AUTO: 0.55 10*3/MM3 (ref 0.1–0.9)
MONOCYTES NFR BLD AUTO: 7.9 % (ref 5–12)
NEUTROPHILS # BLD AUTO: 4.2 10*3/MM3 (ref 1.7–7)
NEUTROPHILS NFR BLD AUTO: 60.5 % (ref 42.7–76)
NITRITE UR QL STRIP: NEGATIVE
NRBC BLD AUTO-RTO: 0 /100 WBC (ref 0–0.2)
PH UR STRIP: 7.5 [PH] (ref 5–8)
PLATELET # BLD AUTO: 332 10*3/MM3 (ref 140–450)
POTASSIUM SERPL-SCNC: 4.2 MMOL/L (ref 3.5–5.2)
PROT SERPL-MCNC: 6.3 G/DL (ref 6–8.5)
PROT UR QL STRIP: NEGATIVE
RBC # BLD AUTO: 4.39 10*6/MM3 (ref 3.77–5.28)
RBC #/AREA URNS HPF: ABNORMAL /HPF
SODIUM SERPL-SCNC: 142 MMOL/L (ref 136–145)
SP GR UR STRIP: 1.01 (ref 1–1.03)
TRIGL SERPL-MCNC: 57 MG/DL (ref 0–150)
TSH SERPL DL<=0.005 MIU/L-ACNC: 1.05 UIU/ML (ref 0.27–4.2)
UROBILINOGEN UR STRIP-MCNC: ABNORMAL MG/DL
VLDLC SERPL CALC-MCNC: 11 MG/DL (ref 5–40)
WBC # BLD AUTO: 6.94 10*3/MM3 (ref 3.4–10.8)
WBC #/AREA URNS HPF: ABNORMAL /HPF

## 2024-11-15 ENCOUNTER — OFFICE VISIT (OUTPATIENT)
Dept: INTERNAL MEDICINE | Facility: CLINIC | Age: 59
End: 2024-11-15
Payer: COMMERCIAL

## 2024-11-15 VITALS
WEIGHT: 180 LBS | BODY MASS INDEX: 26.66 KG/M2 | DIASTOLIC BLOOD PRESSURE: 100 MMHG | HEART RATE: 79 BPM | SYSTOLIC BLOOD PRESSURE: 126 MMHG | HEIGHT: 69 IN | OXYGEN SATURATION: 98 %

## 2024-11-15 DIAGNOSIS — Z00.00 WELL ADULT EXAM: Primary | ICD-10-CM

## 2024-11-15 PROBLEM — Z12.11 ENCOUNTER FOR SCREENING COLONOSCOPY: Status: RESOLVED | Noted: 2024-03-07 | Resolved: 2024-11-15

## 2024-11-15 RX ORDER — CONJUGATED ESTROGENS 0.62 MG/G
CREAM VAGINAL
Qty: 30 G | Refills: 11 | Status: SHIPPED | OUTPATIENT
Start: 2024-11-15

## 2024-11-15 NOTE — PROGRESS NOTES
Subjective     Yi Hurtado is a 59 y.o. female who presents for a complete physical exam.      History of Present Illness     The following data was reviewed by: Thao Vega MD on 11/15/2024:  Common labs          3/26/2024    14:53 11/11/2024    08:50   Common Labs   Glucose 91  87    BUN 13  12    Creatinine 0.63  0.60    Sodium 142  142    Potassium 4.0  4.2    Chloride 104  106    Calcium 9.1  8.9    Total Protein 6.5  6.3    Albumin 4.3  4.0    Total Bilirubin 0.3  0.4    Alkaline Phosphatase 86  88    AST (SGOT) 17  22    ALT (SGPT) 11  13    WBC 7.81  6.94    Hemoglobin 12.5  12.3    Hematocrit 38.3  39.5    Platelets 299  332    Total Cholesterol  191    Triglycerides  57    HDL Cholesterol  72    LDL Cholesterol   108      Overall labs look good.      Review of Systems   Constitutional: Negative.    HENT: Negative.     Eyes: Negative.    Respiratory: Negative.     Cardiovascular: Negative.    Gastrointestinal: Negative.    Endocrine: Negative.    Genitourinary: Negative.    Musculoskeletal: Negative.    Skin: Negative.    Allergic/Immunologic: Negative.    Neurological: Negative.    Hematological: Negative.    Psychiatric/Behavioral: Negative.         The following portions of the patient's history were reviewed and updated as appropriate: allergies, current medications, past family history, past medical history, past social history, past surgical history and problem list.  Health maintenance tab was reviewed and updated with the patient.       Patient Active Problem List    Diagnosis Date Noted    Vitamin D deficiency 03/27/2018    Mild intermittent asthma without complication 02/06/2017    Light tobacco smoker <10 cigarettes per day 02/06/2017    Allergic rhinitis 01/29/2016       Past Medical History:   Diagnosis Date    Allergic     sulpha, cipro    Asthma     Blood tests for routine general physical examination     Dysphagia 06/10/2013    Dr. Leana Greer    Earache     Encounter for  screening colonoscopy 3/7/2024    Eustachian tube dysfunction     Injury of back     lumbar bulging discs, unsure level    Irritable bowel syndrome     Lung nodule 10/28/2022    Myelopathy 1984    Dr. Ross Fernandez and Dr. Moises Orozco    Seizures     Simple goiter     Spinal stenosis     Urinary tract infection        Past Surgical History:   Procedure Laterality Date    APPENDECTOMY N/A 1969    BREAST BIOPSY      CATARACT EXTRACTION, BILATERAL Bilateral 3/2017, 4/2017    COLONOSCOPY N/A 11/13/2023    Procedure: COLONOSCOPY to cecum with cold biopsy polypectomy, hot snare polypectomy, 1.5 cc tattoo ink in recto-sigmoid colon with resolution clip placement x2;  Surgeon: Leana Greer MD;  Location:  [facility];  Service: General;  Laterality: N/A;  Pre: screening  post: torturous colon, polyp    COLONOSCOPY N/A 6/3/2024    Procedure: COLONOSCOPY TO CECUM WITH COLD BIOPSIES;  Surgeon: Leana Greer MD;  Location:  [facility];  Service: General;  Laterality: N/A;  HISTORY OF COLON POLYPS//TORTUOUS COLON, MUCOSAL CHANGE AT POLYPECTOMY SITE    ENDOSCOPY AND COLONOSCOPY N/A 06/10/2013    Mild suggestion of a ringed esophagus, biopsied to r/o eosinophilic esophagitis-Dr. Leana Greer    MAMMO FINE NEEDLE ASPIRATION UNILATERAL Right 2013    pathology negative    PILONIDAL CYSTECTOMY N/A     VAGINAL HYSTERECTOMY SALPINGO OOPHORECTOMY WITH ANTERIOR AND POSTERIOR VAGINAL REPAIR N/A 04/27/2011    Total vaginal hysterectomy, external Andrews's culdoplasty and fulguration of implant of culk-de-sac endometriosis-Dr. Gildardo Howard       Family History   Problem Relation Age of Onset    Dementia Mother     Hypothyroidism Mother     Hypertension Mother     Diabetes type II Mother     Stroke Mother     Thyroid cancer Mother     Alcohol abuse Father     Other Father         acute heart attack    Heart disease Father     ADD / ADHD Son     Breast cancer Neg Hx     Malig Hyperthermia Neg Hx        Social History      Socioeconomic History    Marital status:      Spouse name: Angel Luis    Number of children: 2   Tobacco Use    Smoking status: Every Day     Current packs/day: 0.10     Average packs/day: 0.1 packs/day for 45.9 years (4.6 ttl pk-yrs)     Types: Cigarettes     Start date: 1/6/1979    Smokeless tobacco: Never   Vaping Use    Vaping status: Never Used   Substance and Sexual Activity    Alcohol use: Yes     Alcohol/week: 1.0 standard drink of alcohol     Types: 1 Standard drinks or equivalent per week    Sexual activity: Defer       Current Outpatient Medications on File Prior to Visit   Medication Sig Dispense Refill    albuterol sulfate  (90 Base) MCG/ACT inhaler INHALE 2 PUFFS EVERY 4 HOURS AS NEEDED FOR WHEEZING OR SHORTNESS OF AIR 18 g 1    ALLERGY SERUM INJECTION 2 injections per week      ciclopirox (PENLAC) 8 % solution Apply  topically to the appropriate area as directed Every Night. 6 mL 0    Estrogens Conjugated (Premarin) 0.625 MG/GM vaginal cream Apply pea sized amount three three weekly. 30 g 1    fexofenadine (ALLEGRA) 180 MG tablet Take 1 tablet by mouth 2 (Two) Times a Day.      Flaxseed, Linseed, (FLAX SEEDS PO) Take 1 tablet by mouth Daily.      Fluticasone Furoate-Vilanterol (BREO ELLIPTA IN) Inhale 1 puff Daily.      ibuprofen (ADVIL,MOTRIN) 100 MG/5ML suspension Take 10 mL by mouth Every 6 (Six) Hours As Needed for Mild Pain.      montelukast (SINGULAIR) 10 MG tablet Take 1 tablet by mouth Every Night.      Multiple Vitamin (MULTI VITAMIN PO) Take 1 tablet by mouth Daily.      mupirocin (BACTROBAN) 2 % ointment Apply 1 application  topically to the appropriate area as directed 3 (Three) Times a Day. 30 g 0    Omega-3 Fatty Acids (FISH OIL PO) Take 1 tablet by mouth daily.      Triamcinolone Acetonide (NASACORT AQ NA) 1 spray into each nostril Daily As Needed.      [DISCONTINUED] nitrofurantoin, macrocrystal-monohydrate, (MACROBID) 100 MG capsule Take 1 capsule by mouth Every 12  "(Twelve) Hours.       No current facility-administered medications on file prior to visit.       Allergies   Allergen Reactions    Ciprofloxacin Swelling    Sulfa Antibiotics Mental Status Change       Immunization History   Administered Date(s) Administered    COVID-19 (PFIZER) 12YRS+ (COMIRNATY) 12/20/2023    COVID-19 (PFIZER) BIVALENT 12+YRS 11/19/2022    COVID-19 (PFIZER) Purple Cap Monovalent 02/22/2021, 03/15/2021, 11/13/2021    Covid-19 (Pfizer) Gray Cap Monovalent 05/08/2022    Pneumococcal Polysaccharide (PPSV23) 07/22/2020    TD Preservative Free (Tenivac) 08/04/2020    Tdap 05/01/2008       Objective     /100   Pulse 79   Ht 174 cm (68.5\")   Wt 81.6 kg (180 lb)   SpO2 98%   BMI 26.97 kg/m²     Physical Exam  Constitutional:       Appearance: She is well-developed.   HENT:      Head: Normocephalic and atraumatic.      Right Ear: Hearing, tympanic membrane and external ear normal.      Left Ear: Hearing, tympanic membrane and external ear normal.      Nose: Nose normal.   Neck:      Thyroid: No thyromegaly.   Cardiovascular:      Rate and Rhythm: Normal rate and regular rhythm.      Heart sounds: Normal heart sounds. No murmur heard.  Pulmonary:      Effort: Pulmonary effort is normal.      Breath sounds: Normal breath sounds.   Abdominal:      General: There is no distension.      Palpations: Abdomen is soft.      Tenderness: There is no abdominal tenderness.   Musculoskeletal:      Cervical back: Neck supple.   Lymphadenopathy:      Cervical: No cervical adenopathy.   Skin:     General: Skin is warm and dry.   Neurological:      Mental Status: She is alert and oriented to person, place, and time.   Psychiatric:         Speech: Speech normal.         Behavior: Behavior normal.         Thought Content: Thought content normal.         Judgment: Judgment normal.         Assessment & Plan   Diagnoses and all orders for this visit:    1. Well adult exam (Primary)    Other orders  -     Pneumococcal " Conjugate Vaccine 20-Valent (PCV20)  -     Estrogens Conjugated (Premarin) 0.625 MG/GM vaginal cream; Apply pea sized amount three three weekly.  Dispense: 30 g; Refill: 11        Discussion    Patient presents today for a CPE.      Patient follows a healthy diet.   Patient follows an adequate exercise regimen. Mammogram is up to date.   Colon cancer screening is up to date.   Pap smear no longer performed secondary to hysterectomy.   Discussed importance of preventative care including vaccinations, age appropriate cancer screening, routine lab work, healthy diet, and active lifestyle.I have recommended that the patient get the following immunizations:  Prevnar 20.      Health Maintenance   Topic Date Due    ZOSTER VACCINE (1 of 2) Never done    Pneumococcal Vaccine 0-64 (2 of 2 - PCV) 07/22/2021    COVID-19 Vaccine (7 - 2024-25 season) 09/01/2024    ANNUAL PHYSICAL  11/03/2024    BMI FOLLOWUP  11/15/2025    MAMMOGRAM  05/16/2026    COLORECTAL CANCER SCREENING  06/03/2027    TDAP/TD VACCINES (3 - Td or Tdap) 08/04/2030    HEPATITIS C SCREENING  Completed    INFLUENZA VACCINE  Discontinued            No future appointments.

## 2025-03-12 ENCOUNTER — TRANSCRIBE ORDERS (OUTPATIENT)
Dept: ADMINISTRATIVE | Facility: HOSPITAL | Age: 60
End: 2025-03-12
Payer: COMMERCIAL

## 2025-03-12 DIAGNOSIS — Z12.31 BREAST CANCER SCREENING BY MAMMOGRAM: Primary | ICD-10-CM

## 2025-05-01 ENCOUNTER — HOSPITAL ENCOUNTER (OUTPATIENT)
Dept: MAMMOGRAPHY | Facility: HOSPITAL | Age: 60
Discharge: HOME | End: 2025-05-01
Admitting: INTERNAL MEDICINE
Payer: COMMERCIAL

## 2025-05-01 DIAGNOSIS — Z12.31 BREAST CANCER SCREENING BY MAMMOGRAM: ICD-10-CM

## 2025-05-01 PROCEDURE — 77063 BREAST TOMOSYNTHESIS BI: CPT

## 2025-05-01 PROCEDURE — 77067 SCR MAMMO BI INCL CAD: CPT

## 2025-07-10 ENCOUNTER — TRANSCRIBE ORDERS (OUTPATIENT)
Dept: ADMINISTRATIVE | Facility: HOSPITAL | Age: 60
End: 2025-07-10
Payer: COMMERCIAL

## 2025-07-10 DIAGNOSIS — R91.8 OTHER NONSPECIFIC ABNORMAL FINDING OF LUNG FIELD: Primary | ICD-10-CM

## (undated) DEVICE — LN SMPL CO2 SHTRM SD STREAM W/M LUER

## (undated) DEVICE — SINGLE-USE BIOPSY FORCEPS: Brand: RADIAL JAW 4

## (undated) DEVICE — SENSR O2 OXIMAX FNGR A/ 18IN NONSTR

## (undated) DEVICE — ADAPT CLN BIOGUARD AIR/H2O DISP

## (undated) DEVICE — THE CARR-LOCKE INJECTION NEEDLE IS A SINGLE USE, DISPOSABLE, FLEXIBLE SHEATH INJECTION NEEDLE USED FOR THE INJECTION OF VARIOUS TYPES OF MEDIA THROUGH FLEXIBLE ENDOSCOPES.

## (undated) DEVICE — THE SINGLE USE ETRAP – POLYP TRAP IS USED FOR SUCTION RETRIEVAL OF ENDOSCOPICALLY REMOVED POLYPS.: Brand: ETRAP

## (undated) DEVICE — CANN O2 ETCO2 FITS ALL CONN CO2 SMPL A/ 7IN DISP LF

## (undated) DEVICE — KT ORCA ORCAPOD DISP STRL

## (undated) DEVICE — SNAR POLYP SENSATION STDOVL 27 240 BX40

## (undated) DEVICE — TUBING, SUCTION, 1/4" X 10', STRAIGHT: Brand: MEDLINE

## (undated) DEVICE — ERBE NESSY®PLATE 170 SPLIT; 168CM²; CABLE 3M: Brand: ERBE